# Patient Record
Sex: MALE | Race: WHITE | NOT HISPANIC OR LATINO | Employment: FULL TIME | URBAN - METROPOLITAN AREA
[De-identification: names, ages, dates, MRNs, and addresses within clinical notes are randomized per-mention and may not be internally consistent; named-entity substitution may affect disease eponyms.]

---

## 2017-03-15 NOTE — PROGRESS NOTES
"SUBJECTIVE:   Salvatore Kim is a 46 year old male seen here today for his right Shoulder   What happened: Lifting a bag out of his work truck.       Onset: 3 weeks    Description:   Character: Sharp    Intensity: moderate    Progression of Symptoms: worse    Accompanying Signs & Symptoms:  Other symptoms: numbness and tingling   History:   Previous similar pain: no       Precipitating factors:   Trauma or overuse: YES    Alleviating factors:  Improved by: nothing       Therapies Tried and outcome: None  States that he works as a  and left shoulder bothers him with over head activities.   No numbness/tingling     PFSH:  Past Medical, social, family histories, medications, and allergies were reviewed and updated.     OBJECTIVE:  /73  Pulse 72  Temp 97.4  F (36.3  C) (Oral)  Ht 5' 8\" (1.727 m)  Wt 285 lb 6.4 oz (129.5 kg)  SpO2 96%  BMI 43.39 kg/m2  General:  Salvatore is awake, alert, and cooperative.  NAD.  ailyn Shoulder Exam: Inspection: no swelling, bruising, discoloration, or obvious deformity or asymmetry  Tender: greater tuberosity  Range of Motion  Active:all normal  Passive: all normal  Strength: abduction  4/5, internal rotation  5/5 and external rotation  4/5  Special tests:  Positive: Neers and Ortiz    ASSESSMENT:     ICD-10-CM    1. Bilateral shoulder pain, unspecified chronicity M25.511 oxyCODONE-acetaminophen (PERCOCET) 5-325 MG per tablet    M25.512        PLAN:   ice or cold packs 20 minutes every 2-3 hrs as needed to relieve pain and swelling, for the first 2 days. Then can apply heat 20 minutes every 2-3 hrs (avoid sleeping on heating pad) there after as needed.   If able to tolerate non-steroidal anti-inflammatory medication like: Ibuprofen 600-800 mg three times daily or Aleve 200-400 mg twice daily.   Active range of motion exercises encouraged  Activity modification trying to avoid activities that cause you pain.   Follow up  Occupational Health Dr. Yen: the [facility] " Occupational Medicine team in [place] and can be reached at   [phone].     Nghia Momin PA-C

## 2017-03-16 ENCOUNTER — OFFICE VISIT (OUTPATIENT)
Dept: FAMILY MEDICINE | Facility: CLINIC | Age: 47
End: 2017-03-16
Payer: OTHER MISCELLANEOUS

## 2017-03-16 VITALS
TEMPERATURE: 97.4 F | DIASTOLIC BLOOD PRESSURE: 73 MMHG | HEART RATE: 72 BPM | SYSTOLIC BLOOD PRESSURE: 114 MMHG | HEIGHT: 68 IN | WEIGHT: 285.4 LBS | BODY MASS INDEX: 43.26 KG/M2 | OXYGEN SATURATION: 96 %

## 2017-03-16 DIAGNOSIS — M25.511 BILATERAL SHOULDER PAIN, UNSPECIFIED CHRONICITY: Primary | ICD-10-CM

## 2017-03-16 DIAGNOSIS — M25.512 BILATERAL SHOULDER PAIN, UNSPECIFIED CHRONICITY: Primary | ICD-10-CM

## 2017-03-16 PROCEDURE — 99213 OFFICE O/P EST LOW 20 MIN: CPT | Performed by: PHYSICIAN ASSISTANT

## 2017-03-16 RX ORDER — OXYCODONE AND ACETAMINOPHEN 5; 325 MG/1; MG/1
1 TABLET ORAL EVERY 8 HOURS PRN
Qty: 10 TABLET | Refills: 0 | Status: SHIPPED | OUTPATIENT
Start: 2017-03-16 | End: 2017-06-08

## 2017-03-16 NOTE — PATIENT INSTRUCTIONS
ice or cold packs 20 minutes every 2-3 hrs as needed to relieve pain and swelling, for the first 2 days. Then can apply heat 20 minutes every 2-3 hrs (avoid sleeping on heating pad) there after as needed.   If able to tolerate non-steroidal anti-inflammatory medication like: Ibuprofen 600-800 mg three times daily or Aleve 200-400 mg twice daily.   Active range of motion exercises encouraged  Activity modification trying to avoid activities that cause you pain.   Follow up : Occupational Health Dr. Yen: the [facility] Occupational Medicine team in [place] and can be reached at   [phone].

## 2017-03-16 NOTE — NURSING NOTE
"Chief Complaint   Patient presents with     Shoulder Pain       Initial /73  Pulse 72  Temp 97.4  F (36.3  C) (Oral)  Ht 5' 8\" (1.727 m)  Wt 285 lb 6.4 oz (129.5 kg)  SpO2 96%  BMI 43.39 kg/m2 Estimated body mass index is 43.39 kg/(m^2) as calculated from the following:    Height as of this encounter: 5' 8\" (1.727 m).    Weight as of this encounter: 285 lb 6.4 oz (129.5 kg).  BP completed using cuff size: anthony REY CMA (AMAA)  3:24 PM 3/16/2017    "

## 2017-03-16 NOTE — MR AVS SNAPSHOT
After Visit Summary   3/16/2017    Salvatore Kim    MRN: [id number]           Patient Information     Date Of Birth          1970        Visit Information        Provider Department      3/16/2017 3:30 PM Nghia Momin PA-C [facility]        Today's Diagnoses     Bilateral shoulder pain, unspecified chronicity    -  1      Care Instructions    ice or cold packs 20 minutes every 2-3 hrs as needed to relieve pain and swelling, for the first 2 days. Then can apply heat 20 minutes every 2-3 hrs (avoid sleeping on heating pad) there after as needed.   If able to tolerate non-steroidal anti-inflammatory medication like: Ibuprofen 600-800 mg three times daily or Aleve 200-400 mg twice daily.   Active range of motion exercises encouraged  Activity modification trying to avoid activities that cause you pain.   Follow up : Occupational Health Dr. Yen: the [facility] Occupational Medicine team in [place] and can be reached at   [phone].          Follow-ups after your visit        Who to contact     If you have questions or need follow up information about today's clinic visit or your schedule please contact [facility] directly at [phone].  Normal or non-critical lab and imaging results will be communicated to you by MyChart, letter or phone within 4 business days after the clinic has received the results. If you do not hear from us within 7 days, please contact the clinic through [employer] or phone. If you have a critical or abnormal lab result, we will notify you by phone as soon as possible.  Submit refill requests through [employer] or call your pharmacy and they will forward the refill request to us. Please allow 3 business days for your refill to be completed.          Additional Information About Your Visit        MyChart Information     [employer] gives you secure access to your electronic health record. If you see a primary care provider, you can also  "send messages to your care team and make appointments. If you have questions, please call your primary care clinic.  If you do not have a primary care provider, please call [phone] and they will assist you.        Care EveryWhere ID     This is your Care EveryWhere ID. This could be used by other organizations to access your [place] medical records  [id number]        Your Vitals Were     Pulse Temperature Height Pulse Oximetry BMI (Body Mass Index)       72 97.4  F (36.3  C) (Oral) 5' 8\" (1.727 m) 96% 43.39 kg/m2        Blood Pressure from Last 3 Encounters:   03/16/17 114/73   12/15/16 110/64   09/28/16 130/82    Weight from Last 3 Encounters:   03/16/17 285 lb 6.4 oz (129.5 kg)   12/15/16 294 lb (133.4 kg)   09/28/16 286 lb (129.7 kg)              Today, you had the following     No orders found for display         Today's Medication Changes          These changes are accurate as of: 3/16/17  4:00 PM.  If you have any questions, ask your nurse or doctor.               Start taking these medicines.        Dose/Directions    oxyCODONE-acetaminophen 5-325 MG per tablet   Commonly known as:  PERCOCET   Used for:  Bilateral shoulder pain, unspecified chronicity   Started by:  Nghia Momin PA-C        Dose:  1 tablet   Take 1 tablet by mouth every 8 hours as needed for moderate to severe pain   Quantity:  10 tablet   Refills:  0            Where to get your medicines      Some of these will need a paper prescription and others can be bought over the counter.  Ask your nurse if you have questions.     Bring a paper prescription for each of these medications     oxyCODONE-acetaminophen 5-325 MG per tablet                Primary Care Provider Office Phone # Fax #    Nghia Momin PA-C [phone]       [facility] [address] [facility] [address]        Thank you!     Thank you for choosing [facility]  for your care. Our goal is always to provide you with " excellent care. Hearing back from our patients is one way we can continue to improve our services. Please take a few minutes to complete the written survey that you may receive in the mail after your visit with us. Thank you!             Your Updated Medication List - Protect others around you: Learn how to safely use, store and throw away your medicines at www.disposemymeds.org.          This list is accurate as of: 3/16/17  4:00 PM.  Always use your most recent med list.                   Brand Name Dispense Instructions for use    EPINEPHrine 0.3 MG/0.3ML injection     2 each    Inject 0.3 mLs (0.3 mg) into the muscle once as needed for anaphylaxis       ketorolac 10 MG tablet    TORADOL    20 tablet    Take 1 tablet (10 mg) by mouth every 6 hours as needed       methocarbamol 500 MG tablet    ROBAXIN    30 tablet    Take 2 tablets (1,000 mg) by mouth 3 times daily as needed for muscle spasms       oxyCODONE-acetaminophen 5-325 MG per tablet    PERCOCET    10 tablet    Take 1 tablet by mouth every 8 hours as needed for moderate to severe pain       sildenafil 100 MG cap/tab    REVATIO/VIAGRA    10 tablet    Take 1 tablet (100 mg) by mouth daily as needed for erectile dysfunction

## 2017-06-08 ENCOUNTER — OFFICE VISIT (OUTPATIENT)
Dept: FAMILY MEDICINE | Facility: CLINIC | Age: 47
End: 2017-06-08
Payer: OTHER MISCELLANEOUS

## 2017-06-08 VITALS
SYSTOLIC BLOOD PRESSURE: 112 MMHG | BODY MASS INDEX: 37.34 KG/M2 | TEMPERATURE: 97.2 F | WEIGHT: 245.6 LBS | HEART RATE: 51 BPM | DIASTOLIC BLOOD PRESSURE: 79 MMHG

## 2017-06-08 DIAGNOSIS — Z01.818 PREOP GENERAL PHYSICAL EXAM: Primary | ICD-10-CM

## 2017-06-08 DIAGNOSIS — M25.511 RIGHT SHOULDER PAIN, UNSPECIFIED CHRONICITY: ICD-10-CM

## 2017-06-08 LAB — HGB BLD-MCNC: 14.8 G/DL (ref 13.3–17.7)

## 2017-06-08 PROCEDURE — 99214 OFFICE O/P EST MOD 30 MIN: CPT | Performed by: PHYSICIAN ASSISTANT

## 2017-06-08 PROCEDURE — 36415 COLL VENOUS BLD VENIPUNCTURE: CPT | Performed by: PHYSICIAN ASSISTANT

## 2017-06-08 PROCEDURE — 85018 HEMOGLOBIN: CPT | Performed by: PHYSICIAN ASSISTANT

## 2017-06-08 ASSESSMENT — ANXIETY QUESTIONNAIRES

## 2017-06-08 ASSESSMENT — PATIENT HEALTH QUESTIONNAIRE - PHQ9: 5. POOR APPETITE OR OVEREATING: NOT AT ALL

## 2017-06-08 NOTE — PROGRESS NOTES
[facility]  [address] [address] [phone]  Dept: [phone]    PRE-OP EVALUATION:  Today's date: 2017    Salvatore Kim (: 1970) presents for pre-operative evaluation assessment as requested by Dr. Frey.  He requires evaluation and anesthesia risk assessment prior to undergoing surgery/procedure for treatment of shoulder pain .  Proposed procedure: Right shoulder rotator cuff repair     Date of Surgery/ Procedure: 17  Time of Surgery/ Procedure: 9:30  Hospital/Surgical Facility: [facility]   Fax number for surgical facility: [phone]  Primary Physician: Nghia Momin  Type of Anesthesia Anticipated: to be determined    Patient has a Health Care Directive or Living Will:  NO    1. NO - Do you have a history of heart attack, stroke, stent, bypass or surgery on an artery in the head, neck, heart or legs?  2. NO - Do you ever have any pain or discomfort in your chest?  3. NO - Do you have a history of  Heart Failure?  4. NO - Are you troubled by shortness of breath when: walking on the level, up a slight hill or at night?  5. NO - DO YOU CURRENTLY HAVE A COLD, BRONCHITIS OR OTHER RESPIRATORY INFECTION?   6. NO - Do you have a cough, shortness of breath or wheezing?  7. NO - Do you sometimes get pains in the calves of your legs when you walk?  8. NO - Do you or anyone in your family have previous history of blood clots?  9. NO - Do you or does anyone in your family have a serious bleeding problem such as prolonged bleeding following surgeries or cuts?  10. NO - Have you ever had problems with anemia or been told to take iron pills?  11. NO - Have you had any abnormal blood loss such as black, tarry or bloody stools, or abnormal vaginal bleeding?  12. NO - Have you ever had a blood transfusion?  13. NO - Have you or any of your relatives ever had problems with anesthesia?  14. YES - DO YOU HAVE SLEEP APNEA, EXCESSIVE SNORING  OR DAYTIME DROWSINESS? snores  15. NO - Do you have any prosthetic heart valves?  16. NO - Do you have prosthetic joints?  17. NO - Is there any chance that you may be pregnant?      HPI:                                                      Brief HPI related to upcoming procedure: surgery/procedure for treatment of shoulder pain .  Proposed procedure: Right shoulder rotator cuff repair         See problem list for active medical problems.  Problems all longstanding and stable, except as noted/documented.  See ROS for pertinent symptoms related to these conditions.                                                                                                  .    MEDICAL HISTORY:                                                      Patient Active Problem List    Diagnosis Date Noted     Right shoulder pain, unspecified chronicity 06/08/2017     Priority: Medium     BMI 40.0-44.9, adult (H) 01/07/2016     Priority: Medium     Esophageal reflux (GERD) 05/15/2014     Priority: Medium     Vitamin deficiency 12/20/2013     Priority: Medium     Problem list name updated by automated process. Provider to review       CARDIOVASCULAR SCREENING; LDL GOAL LESS THAN 130 12/18/2013     Priority: Medium     HNP (herniated nucleus pulposus), lumbar 06/05/2013     Priority: Medium     Anal fissure 06/05/2013     Priority: Medium     Allergic reaction 07/06/2012     Priority: Medium     Impotence 06/06/2012     Priority: Medium     Metabolic syndrome      Priority: Medium     Tobacco abuse 05/29/2012     Priority: Medium     Presbyopia OU 05/29/2012     Priority: Medium     Hypertriglyceridemia 05/29/2012     Priority: Medium     Mild major depression (H) 05/18/2011     Priority: Medium      Past Medical History:   Diagnosis Date     Allergies      Arthritis      Depression, anxiety      ED (erectile dysfunction)      Hyperlipidemia      Metabolic syndrome      Past Surgical History:   Procedure Laterality Date     COLONOSCOPY   6/7/2013    Procedure: COLONOSCOPY;  COLONOSCOPY, EXAM UNDER ANESTHESIA,  FISTULOTOMY;  Surgeon: Maxwell Israel MD;  Location: [facility]     EXAM UNDER ANESTHESIA ANUS  6/7/2013    Procedure: EXAM UNDER ANESTHESIA ANUS;;  Surgeon: Maxwell Israel MD;  Location: [facility]     FISTULOTOMY RECTUM  6/7/2013    Procedure: FISTULOTOMY RECTUM;;  Surgeon: Maxwell Israel MD;  Location: [facility]     HEMORRHOID SURGERY       RELEASE CARPAL TUNNEL  2001    Rt     Current Outpatient Prescriptions   Medication Sig Dispense Refill     sildenafil (VIAGRA) 100 MG tablet Take 1 tablet (100 mg) by mouth daily as needed for erectile dysfunction 10 tablet 5     EPINEPHrine (EPIPEN) 0.3 MG/0.3ML injection Inject 0.3 mLs (0.3 mg) into the muscle once as needed for anaphylaxis (Patient not taking: Reported on 3/16/2017) 2 each 6     OTC products: None, except as noted above    Allergies   Allergen Reactions     Bees Swelling      Latex Allergy: NO    Social History   Substance Use Topics     Smoking status: Current Every Day Smoker     Packs/day: 1.50     Years: 25.00     Types: Cigarettes     Smokeless tobacco: Never Used      Comment: quit 3 weeks ago.     Alcohol use 2.0 oz/week     4 Standard drinks or equivalent per week      Comment: RARE, socially      History   Drug Use No       REVIEW OF SYSTEMS:                                                    C: NEGATIVE for fever, chills, change in weight  I: NEGATIVE for worrisome rashes, moles or lesions  E: NEGATIVE for vision changes or irritation  E/M: NEGATIVE for ear, mouth and throat problems  R: NEGATIVE for significant cough or SOB  B: NEGATIVE for masses, tenderness or discharge  CV: NEGATIVE for chest pain, palpitations or peripheral edema  GI: NEGATIVE for nausea, abdominal pain, heartburn, or change in bowel habits  : NEGATIVE for frequency, dysuria, or hematuria  M: NEGATIVE for significant arthralgias or myalgia  N: NEGATIVE for weakness, dizziness or paresthesias  E: NEGATIVE for  temperature intolerance, skin/hair changes  H: NEGATIVE for bleeding problems  P: NEGATIVE for changes in mood or affect    EXAM:                                                    /79  Pulse 51  Temp 97.2  F (36.2  C) (Oral)  Wt 245 lb 9.6 oz (111.4 kg)  BMI 37.34 kg/m2    GENERAL APPEARANCE: healthy, alert and no distress     EYES: EOMI,  PERRL     HENT: ear canals and TM's normal and nose and mouth without ulcers or lesions     NECK: no adenopathy, no asymmetry, masses, or scars and thyroid normal to palpation     RESP: lungs clear to auscultation - no rales, rhonchi or wheezes     CV: regular rates and rhythm, normal S1 S2, no S3 or S4 and no murmur, click or rub     ABDOMEN:  soft, nontender, no HSM or masses and bowel sounds normal     MS: extremities normal- no gross deformities noted, no evidence of inflammation in joints, FROM in all extremities.     SKIN: no suspicious lesions or rashes     NEURO: Normal strength and tone, sensory exam grossly normal, mentation intact and speech normal     PSYCH: mentation appears normal. and affect normal/bright     LYMPHATICS: No axillary, cervical, or supraclavicular nodes    DIAGNOSTICS:                                                      Labs Resulted Today:   Results for orders placed or performed in visit on 06/08/17   Hemoglobin   Result Value Ref Range    Hemoglobin 14.8 13.3 - 17.7 g/dL       Recent Labs   Lab Test  01/09/16   1040  05/07/14   0752  12/18/13   1539  06/05/13   1530   05/29/12   0810   HGB   --    --   15.0  14.6   < >   --    PLT   --    --   220  225   < >   --    NA  141   --   140  141   < >   --    POTASSIUM  4.1   --   3.9  4.3   < >   --    CR  0.84   --   0.77  0.86   < >   --    A1C   --   5.8   --    --    --   5.6    < > = values in this interval not displayed.        IMPRESSION:                                                    Reason for surgery/procedure: surgery/procedure for treatment of shoulder pain .  Proposed  procedure: Right shoulder rotator cuff repair       The proposed surgical procedure is considered LOW risk.    REVISED CARDIAC RISK INDEX  The patient has the following serious cardiovascular risks for perioperative complications such as (MI, PE, VFib and 3  AV Block):  No serious cardiac risks  INTERPRETATION: 0 risks: Class I (very low risk - 0.4% complication rate)    The patient has the following additional risks for perioperative complications:  No identified additional risks      ICD-10-CM    1. Preop general physical exam Z01.818 Hemoglobin   2. Right shoulder pain, unspecified chronicity M25.511    3. BMI 40.0-44.9, adult (H) Z68.41 SLEEP EVALUATION & MANAGEMENT REFERRAL - ADULT       RECOMMENDATIONS:                                                          APPROVAL GIVEN to proceed with proposed procedure, without further diagnostic evaluation       Signed Electronically by: Nghia Momin PA-C  Discussed this patient with  Nghia Momin and agree with above assessment and plan. The patient is an acceptable candidate for the proposed anasthesia.  Cameron Comer MD      Copy of this evaluation report is provided to requesting physician.    [facility] Preop Guidelines

## 2017-06-08 NOTE — MR AVS SNAPSHOT
After Visit Summary   6/8/2017    Salvatore Kim    MRN: [id number]           Patient Information     Date Of Birth          1970        Visit Information        Provider Department      6/8/2017 3:30 PM Nghia Momin PA-C [facility]        Today's Diagnoses     Preop general physical exam    -  1    Right shoulder pain, unspecified chronicity        BMI 40.0-44.9, adult (H)          Care Instructions      Before Your Surgery      Call your surgeon if there is any change in your health. This includes signs of a cold or flu (such as a sore throat, runny nose, cough, rash or fever).    Do not smoke, drink alcohol or take over the counter medicine (unless your surgeon or primary care doctor tells you to) for the 24 hours before and after surgery.    If you take prescribed drugs: Follow your doctor s orders about which medicines to take and which to stop until after surgery.    Eating and drinking prior to surgery: follow the instructions from your surgeon    Take a shower or bath the night before surgery. Use the soap your surgeon gave you to gently clean your skin. If you do not have soap from your surgeon, use your regular soap. Do not shave or scrub the surgery site.  Wear clean pajamas and have clean sheets on your bed.           Follow-ups after your visit        Additional Services     SLEEP EVALUATION & MANAGEMENT REFERRAL - ADULT       Please be aware that coverage of these services is subject to the terms and limitations of your health insurance plan.  Call member services at your health plan with any benefit or coverage questions.      Please bring the following to your appointment:    >>   List of current medications   >>   This referral request   >>   Any documents/labs given to you for this referral    [facility] [place] Ph [phone] (Age 15 and up)                  Future tests that were ordered for you today     Open Future Orders         Priority Expected Expires Ordered    SLEEP EVALUATION & MANAGEMENT REFERRAL - ADULT Routine  6/8/2018 6/8/2017            Who to contact     If you have questions or need follow up information about today's clinic visit or your schedule please contact [facility] directly at [phone].  Normal or non-critical lab and imaging results will be communicated to you by MyChart, letter or phone within 4 business days after the clinic has received the results. If you do not hear from us within 7 days, please contact the clinic through [employer] or phone. If you have a critical or abnormal lab result, we will notify you by phone as soon as possible.  Submit refill requests through [employer] or call your pharmacy and they will forward the refill request to us. Please allow 3 business days for your refill to be completed.          Additional Information About Your Visit        [employer] Information     [employer] gives you secure access to your electronic health record. If you see a primary care provider, you can also send messages to your care team and make appointments. If you have questions, please call your primary care clinic.  If you do not have a primary care provider, please call [phone] and they will assist you.        Care EveryWhere ID     This is your Care EveryWhere ID. This could be used by other organizations to access your [place] medical records  [id number]        Your Vitals Were     Pulse Temperature BMI (Body Mass Index)             51 97.2  F (36.2  C) (Oral) 37.34 kg/m2          Blood Pressure from Last 3 Encounters:   06/08/17 112/79   03/16/17 114/73   12/15/16 110/64    Weight from Last 3 Encounters:   06/08/17 245 lb 9.6 oz (111.4 kg)   03/16/17 285 lb 6.4 oz (129.5 kg)   12/15/16 294 lb (133.4 kg)              We Performed the Following     Hemoglobin        Primary Care Provider Office Phone # Fax #    Nghia Momin PA-C [phone]       [facility]  [address] [address] [address]        Thank you!     Thank you for choosing [facility]  for your care. Our goal is always to provide you with excellent care. Hearing back from our patients is one way we can continue to improve our services. Please take a few minutes to complete the written survey that you may receive in the mail after your visit with us. Thank you!             Your Updated Medication List - Protect others around you: Learn how to safely use, store and throw away your medicines at www.disposemymeds.org.          This list is accurate as of: 6/8/17  3:35 PM.  Always use your most recent med list.                   Brand Name Dispense Instructions for use    EPINEPHrine 0.3 MG/0.3ML injection     2 each    Inject 0.3 mLs (0.3 mg) into the muscle once as needed for anaphylaxis       sildenafil 100 MG cap/tab    REVATIO/VIAGRA    10 tablet    Take 1 tablet (100 mg) by mouth daily as needed for erectile dysfunction

## 2017-06-08 NOTE — NURSING NOTE
"Chief Complaint   Patient presents with     Pre-Op Exam       Initial /79  Pulse 51  Temp 97.2  F (36.2  C) (Oral)  Wt 245 lb 9.6 oz (111.4 kg)  BMI 37.34 kg/m2 Estimated body mass index is 37.34 kg/(m^2) as calculated from the following:    Height as of 3/16/17: 5' 8\" (1.727 m).    Weight as of this encounter: 245 lb 9.6 oz (111.4 kg).  Medication Reconciliation: complete  Anshu Oleary CMA    "

## 2017-06-09 ASSESSMENT — ANXIETY QUESTIONNAIRES: GAD7 TOTAL SCORE: 0

## 2017-06-09 ASSESSMENT — PATIENT HEALTH QUESTIONNAIRE - PHQ9: SUM OF ALL RESPONSES TO PHQ QUESTIONS 1-9: 2

## 2017-08-02 ENCOUNTER — THERAPY VISIT (OUTPATIENT)
Dept: PHYSICAL THERAPY | Facility: CLINIC | Age: 47
End: 2017-08-02
Payer: OTHER MISCELLANEOUS

## 2017-08-02 DIAGNOSIS — M25.511 RIGHT SHOULDER PAIN, UNSPECIFIED CHRONICITY: Primary | ICD-10-CM

## 2017-08-02 DIAGNOSIS — Z98.890 S/P SHOULDER SURGERY: ICD-10-CM

## 2017-08-02 PROCEDURE — 97161 PT EVAL LOW COMPLEX 20 MIN: CPT | Mod: GP | Performed by: PHYSICAL THERAPIST

## 2017-08-02 PROCEDURE — 97110 THERAPEUTIC EXERCISES: CPT | Mod: GP | Performed by: PHYSICAL THERAPIST

## 2017-08-02 NOTE — LETTER
[facility]  [address]  [place] [address]  [phone]    2017    Re: Salvatore Kim   :   1970  MRN:  [id number]   REFERRING PHYSICIAN:   [facility]    Date of Initial Evaluation:  17  Visits:  Rxs Used: 1  Reason for Referral:     Right shoulder pain, unspecified chronicity  S/P shoulder surgery    [facility] Initial Evaluation    Subjective:    HPI Comments: SPADI 89/100   Patient is a 47 year old male presenting with rehab right shoulder hpi.   Salvatore Kim is a 47 year old male with a right shoulder condition.  Condition occurred with:  Repetition/overuse and lifting.  Condition occurred: at work.  This is a new condition  2017, post op RCR (Supraspinatus, Subscap), extensive debridement and bicep tenodesis by Dr. Frey.    Patient reports pain:  Anterior, lateral, upper arm and in the joint.  Radiates to:  Shoulder and upper arm.  Pain is described as sharp and aching and is constant and reported as 5/10 (occasionally spikes to 9-10/10 ).  Associated symptoms:  Loss of motion/stiffness, painful arc and loss of strength. Pain is the same all the time.  Symptoms are exacerbated by using arm overhead, using arm at shoulder level, using arm behind back, lifting, carrying and lying on extremity (folloing post op use of sling and minimal use following surgery ) and relieved by ice and bracing/immobilizing.  Since onset symptoms are gradually improving.  Special tests:  MRI and x-ray.      General health as reported by patient is good.  Pertinent medical history includes:  Smoking.  Medical allergies: no.  Other surgeries include:  None reported.  Current medications:  None as reported by the patient.  Current occupation is [employer] .  Patient is currently not working due to present treatment problem.  Primary job tasks include:  Prolonged sitting, prolonged standing, lifting, repetitive tasks,  operating a machine and driving.  Barriers include:  None as reported by the patient.  Red flags:  None as reported by the patient.    Objective:    Standing Alignment:    Cervical/Thoracic:  Forward head  Shoulder/UE:  Rounded shoulders    Gait:    Gait Type:  Normal     Flexibility/Screens:   Negative screens: Cervical   Neurological: He is alert. He has normal strength and normal reflexes. No cranial nerve deficit or sensory deficit.     Shoulder Evaluation:  ROM:    PROM:    Flexion:  Right: 60    Internal Rotation:  Right:  40  External Rotation:  Right:  0-10    Pain: patient apprehensive to movement and pain.     Strength:  not assessed    Palpation:    Right shoulder tenderness present at: Acrimioclavicular; Upper Trap and Incisional    Review of Systems   Musculoskeletal: Negative for neck pain.     Assessment/Plan:    Patient is a 47 year old male with right side shoulder complaints.    Patient has the following significant findings with corresponding treatment plan.                Diagnosis 1:  R shoulder arthroscopic RCR , caution with subscap repair   Pain -  hot/cold therapy, self management, education, directional preference exercise, home program and post operative MD protocol   Decreased ROM/flexibility -   Decreased strength -   Decreased function -     Therapy Evaluation Codes:   1) History comprised of:   Personal factors that impact the plan of care:      Past/current experiences and post operative protocol precautions .    Comorbidity factors that impact the plan of care are:      Smoking.     Medications impacting care: None.  2) Examination of Body Systems comprised of:   Body structures and functions that impact the plan of care:      Shoulder.   Activity limitations that impact the plan of care are:      Cooking, Dressing, Lifting, Working, Sleeping and Laying down.  3) Clinical presentation characteristics are:   Stable/Uncomplicated.  4) Decision-Making    Low complexity using  standardized patient assessment instrument and/or measureable assessment of functional outcome.  Cumulative Therapy Evaluation is: Low complexity.  Previous and current functional limitations:  (See Goal Flow Sheet for this information)    Short term and Long term goals: (See Goal Flow Sheet for this information)   Communication ability:  Patient appears to be able to clearly communicate and understand verbal and written communication and follow directions correctly.  Treatment Explanation - The following has been discussed with the patient:   RX ordered/plan of care  Anticipated outcomes  Possible risks and side effects  This patient would benefit from PT intervention to resume normal activities.   Rehab potential is good.    Frequency:  1 X week, once daily  Duration:  for 6 weeks tapering to 1 X a week every other week  over 12 weeks  Discharge Plan:  Achieve all LTG.  Independent in home treatment program.  Reach maximal therapeutic benefit.    Rehab Plans: SAOS post op RCR Protocol     Thank you for your referral.    INQUIRIES  Therapist: [facility]  [address] [place] [address]  Phone: [phone]  Fax: [phone]

## 2017-08-08 ENCOUNTER — THERAPY VISIT (OUTPATIENT)
Dept: PHYSICAL THERAPY | Facility: CLINIC | Age: 47
End: 2017-08-08
Payer: OTHER MISCELLANEOUS

## 2017-08-08 DIAGNOSIS — M25.511 RIGHT SHOULDER PAIN, UNSPECIFIED CHRONICITY: ICD-10-CM

## 2017-08-08 DIAGNOSIS — Z98.890 S/P SHOULDER SURGERY: ICD-10-CM

## 2017-08-08 PROCEDURE — 97110 THERAPEUTIC EXERCISES: CPT | Mod: GP | Performed by: PHYSICAL THERAPIST

## 2017-08-13 ASSESSMENT — ENCOUNTER SYMPTOMS: NECK PAIN: 0

## 2017-08-14 NOTE — PROGRESS NOTES
[facility] Initial Evaluation      Subjective:    HPI Comments: SPADI 89/100     Patient is a 47 year old male presenting with rehab right shoulder hpi.   Salvatore Kim is a 47 year old male with a right shoulder condition.  Condition occurred with:  Repetition/overuse and lifting.  Condition occurred: at work.  This is a new condition  6/19/2017, post op RCR (Supraspinatus, Subscap), extensive debridement and bicep tenodesis by Dr. Frey.    Patient reports pain:  Anterior, lateral, upper arm and in the joint.  Radiates to:  Shoulder and upper arm.  Pain is described as sharp and aching and is constant and reported as 5/10 (occasionally spikes to 9-10/10 ).  Associated symptoms:  Loss of motion/stiffness, painful arc and loss of strength. Pain is the same all the time.  Symptoms are exacerbated by using arm overhead, using arm at shoulder level, using arm behind back, lifting, carrying and lying on extremity (folloing post op use of sling and minimal use following surgery ) and relieved by ice and bracing/immobilizing.  Since onset symptoms are gradually improving.  Special tests:  MRI and x-ray.      General health as reported by patient is good.  Pertinent medical history includes:  Smoking.  Medical allergies: no.  Other surgeries include:  None reported.  Current medications:  None as reported by the patient.  Current occupation is [employer] .  Patient is currently not working due to present treatment problem.  Primary job tasks include:  Prolonged sitting, prolonged standing, lifting, repetitive tasks, operating a machine and driving.    Barriers include:  None as reported by the patient.    Red flags:  None as reported by the patient.                        Objective:    Standing Alignment:    Cervical/Thoracic:  Forward head  Shoulder/UE:  Rounded shoulders              Gait:    Gait Type:  Normal         Flexibility/Screens:   Negative screens: Cervical           Neurological: He is  alert. He has normal strength and normal reflexes. No cranial nerve deficit or sensory deficit.                      Shoulder Evaluation:  ROM:    PROM:    Flexion:  Right: 60          Internal Rotation:  Right:  40  External Rotation:  Right:  0-10                Pain: patient apprehensive to movement and pain.     Strength:  not assessed                          Palpation:      Right shoulder tenderness present at: Acrimioclavicular; Upper Trap and Incisional                                     General     Review of Systems   Musculoskeletal: Negative for neck pain.       Assessment/Plan:      Patient is a 47 year old male with right side shoulder complaints.    Patient has the following significant findings with corresponding treatment plan.                Diagnosis 1:  R shoulder arthroscopic RCR , caution with subscap repair   Pain -  hot/cold therapy, self management, education, directional preference exercise, home program and post operative MD protocol   Decreased ROM/flexibility -   Decreased strength -   Decreased function -     Therapy Evaluation Codes:   1) History comprised of:   Personal factors that impact the plan of care:      Past/current experiences and post operative protocol precautions .    Comorbidity factors that impact the plan of care are:      Smoking.     Medications impacting care: None.  2) Examination of Body Systems comprised of:   Body structures and functions that impact the plan of care:      Shoulder.   Activity limitations that impact the plan of care are:      Cooking, Dressing, Lifting, Working, Sleeping and Laying down.  3) Clinical presentation characteristics are:   Stable/Uncomplicated.  4) Decision-Making    Low complexity using standardized patient assessment instrument and/or measureable assessment of functional outcome.  Cumulative Therapy Evaluation is: Low complexity.    Previous and current functional limitations:  (See Goal Flow Sheet for this information)    Short  term and Long term goals: (See Goal Flow Sheet for this information)     Communication ability:  Patient appears to be able to clearly communicate and understand verbal and written communication and follow directions correctly.  Treatment Explanation - The following has been discussed with the patient:   RX ordered/plan of care  Anticipated outcomes  Possible risks and side effects  This patient would benefit from PT intervention to resume normal activities.   Rehab potential is good.    Frequency:  1 X week, once daily  Duration:  for 6 weeks tapering to 1 X a week every other week  over 12 weeks  Discharge Plan:  Achieve all LTG.  Independent in home treatment program.  Reach maximal therapeutic benefit.    Rehab Plans: SAOS post op RCR Protocol     Please refer to the daily flowsheet for treatment today, total treatment time and time spent performing 1:1 timed codes.

## 2017-08-17 ENCOUNTER — THERAPY VISIT (OUTPATIENT)
Dept: PHYSICAL THERAPY | Facility: CLINIC | Age: 47
End: 2017-08-17
Payer: OTHER MISCELLANEOUS

## 2017-08-17 DIAGNOSIS — Z98.890 S/P SHOULDER SURGERY: Primary | ICD-10-CM

## 2017-08-17 DIAGNOSIS — M25.511 RIGHT SHOULDER PAIN, UNSPECIFIED CHRONICITY: ICD-10-CM

## 2017-08-17 PROCEDURE — 97110 THERAPEUTIC EXERCISES: CPT | Mod: GP | Performed by: PHYSICAL THERAPIST

## 2017-08-22 ENCOUNTER — THERAPY VISIT (OUTPATIENT)
Dept: PHYSICAL THERAPY | Facility: CLINIC | Age: 47
End: 2017-08-22
Payer: OTHER MISCELLANEOUS

## 2017-08-22 DIAGNOSIS — Z98.890 S/P SHOULDER SURGERY: ICD-10-CM

## 2017-08-22 DIAGNOSIS — M25.511 RIGHT SHOULDER PAIN, UNSPECIFIED CHRONICITY: ICD-10-CM

## 2017-08-22 PROCEDURE — 97110 THERAPEUTIC EXERCISES: CPT | Mod: GP | Performed by: PHYSICAL THERAPIST

## 2017-08-29 ENCOUNTER — THERAPY VISIT (OUTPATIENT)
Dept: PHYSICAL THERAPY | Facility: CLINIC | Age: 47
End: 2017-08-29
Payer: OTHER MISCELLANEOUS

## 2017-08-29 DIAGNOSIS — Z98.890 S/P SHOULDER SURGERY: ICD-10-CM

## 2017-08-29 DIAGNOSIS — M25.511 RIGHT SHOULDER PAIN, UNSPECIFIED CHRONICITY: ICD-10-CM

## 2017-08-29 PROCEDURE — 97110 THERAPEUTIC EXERCISES: CPT | Mod: GP | Performed by: PHYSICAL THERAPIST

## 2017-09-05 ENCOUNTER — THERAPY VISIT (OUTPATIENT)
Dept: PHYSICAL THERAPY | Facility: CLINIC | Age: 47
End: 2017-09-05
Payer: OTHER MISCELLANEOUS

## 2017-09-05 DIAGNOSIS — M25.511 RIGHT SHOULDER PAIN, UNSPECIFIED CHRONICITY: ICD-10-CM

## 2017-09-05 DIAGNOSIS — Z98.890 S/P SHOULDER SURGERY: ICD-10-CM

## 2017-09-05 PROCEDURE — 97110 THERAPEUTIC EXERCISES: CPT | Mod: GP | Performed by: PHYSICAL THERAPIST

## 2017-09-05 NOTE — LETTER
[facility]  [address]  [place] [address]  [phone]    2017    Re: Salvatore Kim   :   1970  MRN:  [id number]   REFERRING PHYSICIAN:   [facility]    Date of Initial Evaluation:  17  Visits:  Rxs Used: 6  Reason for Referral:     S/P shoulder surgery  Right shoulder pain, unspecified chronicity    PROGRESS  REPORT  Progress reporting period is from 17 to 17.     SUBJECTIVE  Subjective: pt reports arm is feeling stiff in the morning but overall continued improvement.   Current Pain level: 0/10   Initial Pain level: 9/10   Changes in function: Yes, see goal flow sheet for change in function   Adverse reactions: None   The objective findings are from DOS 17.    OBJECTIVE  Objective: AAROM R shoulder flx:147degs abd:147degs ER:58degs       ASSESSMENT/PLAN  Updated problem list and treatment plan: Diagnosis 1:  S/p R RTCR, extensive debridement, subpec bicep tendon transplant  STG/LTGs have been met or progress has been made towards goals:  Yes, progressing towards active painfree reaching.  Assessment of Progress: The patient's condition is improving.  The patient's condition has potential to improve.  Self Management Plans:  Patient has been instructed in a home treatment program.  Patient is independent in a home treatment program.  I have re-evaluated this patient and find that the nature, scope, duration and intensity of the therapy is appropriate for the medical condition of the patient.  Salvatore continues to require the following intervention to meet STG and LTG's: PT  The patient is returning to your office for a recheck appointment.    Recommendations:  Salvatore continues to improve AAROM R shoulder with progressively decreased pain. He continues to reports morning stiffness with intermittent RUE tingling/numbness that overall has improved. Today he mentioned for the first time some mid clavicle irritation that I  suspect is related to pec portion of surgery. Currently we have used 6 of 6 PT visits, he would benefit from continued PT services to continue to progress AAROM to AROM and initiate and progress shoulder strengthening once you feel appropriate. Please provide patient with updated PT order. Thank you.    Thank you for your referral.    INQUIRIES  Therapist: Roldan Bejarano DPT [facility]  [address]  [place] [address]  Phone: [phone]  Fax: [phone]

## 2017-09-05 NOTE — PROGRESS NOTES
Subjective:    HPI                    Objective:    System    Physical Exam    General     ROS    Assessment/Plan:      PROGRESS  REPORT    Progress reporting period is from 8/8/17 to 9/5/17.     SUBJECTIVE  Subjective: pt reports arm is feeling stiff in the morning but overall continued improvement.   Current Pain level: 0/10   Initial Pain level: 9/10   Changes in function: Yes, see goal flow sheet for change in function   Adverse reactions: None;   ,     The objective findings are from DOS 9/5/17.    OBJECTIVE  Objective: AAROM R shoulder flx:147degs abd:147degs ER:58degs       ASSESSMENT/PLAN  Updated problem list and treatment plan: Diagnosis 1:  S/p R RTCR, extensive debridement, subpec bicep tendon transplant  STG/LTGs have been met or progress has been made towards goals:  Yes, progressing towards active painfree reaching.  Assessment of Progress: The patient's condition is improving.  The patient's condition has potential to improve.  Self Management Plans:  Patient has been instructed in a home treatment program.  Patient is independent in a home treatment program.  I have re-evaluated this patient and find that the nature, scope, duration and intensity of the therapy is appropriate for the medical condition of the patient.  Salvatore continues to require the following intervention to meet STG and LTG's: PT  The patient is returning to your office for a recheck appointment.    Recommendations:  Salvatore continues to improve AAROM R shoulder with progressively decreased pain. He continues to reports morning stiffness with intermittent RUE tingling/numbness that overall has improved. Today he mentioned for the first time some mid clavicle irritation that I suspect is related to pec portion of surgery. Currently we have used 6 of 6 PT visits, he would benefit from continued PT services to continue to progress AAROM to AROM and initiate and progress shoulder strengthening once you feel appropriate. Please provide  patient with updated PT order. Thank you.    Please refer to the daily flowsheet for treatment today, total treatment time and time spent performing 1:1 timed codes.

## 2017-09-05 NOTE — MR AVS SNAPSHOT
After Visit Summary   9/5/2017    Salvatore Kim    MRN: [id number]           Patient Information     Date Of Birth          1970        Visit Information        Provider Department      9/5/2017 9:00 AM Roldan Bejarano PT [facility]        Today's Diagnoses     S/P shoulder surgery        Right shoulder pain, unspecified chronicity           Follow-ups after your visit        Your next 10 appointments already scheduled     Sep 14, 2017 10:40 AM CDT   [facility] with Beba Costa OD   [facility]    [address] [facility] [address]   [phone]            Sep 14, 2017  1:30 PM CDT   [facility] ([facility] [place]    [address]  [place] [id number] [phone]            Sep 21, 2017  9:00 AM CDT   [facility] ([facility] [place])    [address]  [place] [id number] [phone]              Who to contact     If you have questions or need follow up information about today's clinic visit or your schedule please contact [facility] directly at [phone].  Normal or non-critical lab and imaging results will be communicated to you by [employer], letter or phone within 4 business days after the clinic has received the results. If you do not hear from us within 7 days, please contact the clinic through [employer] or phone. If you have a critical or abnormal lab result, we will notify you by phone as soon as possible.  Submit refill requests through [employer] or call your pharmacy and they will forward the refill request to us. Please allow 3 business days for your refill to be completed.          Additional Information About Your Visit        [employer] Information     [employer] gives you secure access to your electronic health record. If you see a primary care provider, you can also send messages to your  care team and make appointments. If you have questions, please call your primary care clinic.  If you do not have a primary care provider, please call [phone] and they will assist you.        Care EveryWhere ID     This is your Care EveryWhere ID. This could be used by other organizations to access your [facility] medical records  [id number]         Blood Pressure from Last 3 Encounters:   06/08/17 112/79   03/16/17 114/73   12/15/16 110/64    Weight from Last 3 Encounters:   06/08/17 111.4 kg (245 lb 9.6 oz)   03/16/17 129.5 kg (285 lb 6.4 oz)   12/15/16 133.4 kg (294 lb)              We Performed the Following     [facility] PROGRESS NOTES REPORT     THERAPEUTIC EXERCISES        Primary Care Provider Office Phone # Fax #    Nghia Momin PA-C [phone] [address] [facility] [address]        Equal Access to Services     DOMINIC CASTANEDA : Hadii aad ku hadasho Soomaali, waaxda luqadaha, qaybta kaalmada adeegyada, waxay idiin hayannetten raudel [place] . So [facility] [phone].    ATENCIÓN: Si dawsonla espscotty, tiene a lucas disposición servicios gratuitos de asistencia lingüística. Llame al [phone].    We comply with applicable federal civil rights laws and [place] laws. We do not discriminate on the basis of race, color, national origin, age, disability sex, sexual orientation or gender identity.            Thank you!     Thank you for choosing [facility]  for your care. Our goal is always to provide you with excellent care. Hearing back from our patients is one way we can continue to improve our services. Please take a few minutes to complete the written survey that you may receive in the mail after your visit with us. Thank you!             Your Updated Medication List - Protect others around you: Learn how to safely use, store and throw away your medicines at www.disposemymeds.org.          This list is accurate as of: 9/5/17  9:52 AM.  Always use your most recent  med list.                   Brand Name Dispense Instructions for use Diagnosis    EPINEPHrine 0.3 MG/0.3ML injection 2-pack    EPIPEN/ADRENACLICK/or ANY BX GENERIC EQUIV    2 each    Inject 0.3 mLs (0.3 mg) into the muscle once as needed for anaphylaxis    Hives, Allergic reaction, subsequent encounter       sildenafil 100 MG cap/tab    REVATIO/VIAGRA    10 tablet    Take 1 tablet (100 mg) by mouth daily as needed for erectile dysfunction    Impotence

## 2017-09-14 ENCOUNTER — THERAPY VISIT (OUTPATIENT)
Dept: PHYSICAL THERAPY | Facility: CLINIC | Age: 47
End: 2017-09-14
Payer: OTHER MISCELLANEOUS

## 2017-09-14 ENCOUNTER — OFFICE VISIT (OUTPATIENT)
Dept: OPTOMETRY | Facility: CLINIC | Age: 47
End: 2017-09-14
Payer: COMMERCIAL

## 2017-09-14 DIAGNOSIS — H52.203 HYPEROPIA WITH ASTIGMATISM AND PRESBYOPIA, BILATERAL: Primary | ICD-10-CM

## 2017-09-14 DIAGNOSIS — H52.4 HYPEROPIA WITH ASTIGMATISM AND PRESBYOPIA, BILATERAL: Primary | ICD-10-CM

## 2017-09-14 DIAGNOSIS — H52.03 HYPEROPIA WITH ASTIGMATISM AND PRESBYOPIA, BILATERAL: Primary | ICD-10-CM

## 2017-09-14 DIAGNOSIS — H04.123 DRY EYES, BILATERAL: ICD-10-CM

## 2017-09-14 DIAGNOSIS — M25.511 RIGHT SHOULDER PAIN, UNSPECIFIED CHRONICITY: ICD-10-CM

## 2017-09-14 DIAGNOSIS — Z98.890 S/P SHOULDER SURGERY: ICD-10-CM

## 2017-09-14 PROCEDURE — 92015 DETERMINE REFRACTIVE STATE: CPT | Performed by: OPTOMETRIST

## 2017-09-14 PROCEDURE — 97110 THERAPEUTIC EXERCISES: CPT | Mod: GP | Performed by: PHYSICAL THERAPIST

## 2017-09-14 PROCEDURE — 92014 COMPRE OPH EXAM EST PT 1/>: CPT | Performed by: OPTOMETRIST

## 2017-09-14 ASSESSMENT — EXTERNAL EXAM - LEFT EYE: OS_EXAM: NORMAL

## 2017-09-14 ASSESSMENT — VISUAL ACUITY
OS_SC: 20/20
CORRECTION_TYPE: GLASSES
OS_CC: 20/25-2
OS_SC+: -1
OS_SC: 20/100
OD_SC: 20/25
METHOD: SNELLEN - LINEAR
OD_CC: 20/25-1
OD_SC: 20/200

## 2017-09-14 ASSESSMENT — KERATOMETRY
OD_AXISANGLE2_DEGREES: 4
OD_K1POWER_DIOPTERS: 44.25
OD_K2POWER_DIOPTERS: 45.00
OS_AXISANGLE2_DEGREES: 163
OS_K1POWER_DIOPTERS: 45.00
OS_K2POWER_DIOPTERS: 45.25

## 2017-09-14 ASSESSMENT — REFRACTION_WEARINGRX
OS_ADD: +1.50
OS_AXIS: 178
OS_CYLINDER: +0.75
OS_SPHERE: +0.75
SPECS_TYPE: OTC'S
OD_ADD: +1.50
OD_SPHERE: +2.00
OD_SPHERE: +0.75
OD_AXIS: 011
OS_SPHERE: +2.00
OD_CYLINDER: +0.50

## 2017-09-14 ASSESSMENT — REFRACTION_MANIFEST
OS_SPHERE: +1.25
OS_AXIS: 180
OD_SPHERE: +1.75
OD_ADD: +1.50
OS_SPHERE: +0.75
OD_CYLINDER: +0.50
OD_AXIS: 175
OD_SPHERE: +1.25
OD_CYLINDER: +0.25
METHOD_AUTOREFRACTION: 1
OS_CYLINDER: +0.25
OD_AXIS: 156
OS_ADD: +1.50

## 2017-09-14 ASSESSMENT — CUP TO DISC RATIO
OS_RATIO: 0.3
OD_RATIO: 0.3

## 2017-09-14 ASSESSMENT — EXTERNAL EXAM - RIGHT EYE: OD_EXAM: NORMAL

## 2017-09-14 ASSESSMENT — TONOMETRY
IOP_METHOD: APPLANATION
OD_IOP_MMHG: 12
OS_IOP_MMHG: 12

## 2017-09-14 ASSESSMENT — SLIT LAMP EXAM - LIDS
COMMENTS: 2+ MEIBOMIAN GLAND DYSFUNCTION, 1+ BLEPHARITIS
COMMENTS: 1+ MEIBOMIAN GLAND DYSFUNCTION, 1+ BLEPHARITIS

## 2017-09-14 ASSESSMENT — CONF VISUAL FIELD
OS_NORMAL: 1
OD_NORMAL: 1
METHOD: COUNTING FINGERS

## 2017-09-14 NOTE — PROGRESS NOTES
Chief Complaint   Patient presents with     COMPREHENSIVE EYE EXAM     yearly         Last Eye Exam: 1/13/2016  Dilated Previously: Yes    What are you currently using to see?  Readers, wears them a lot of the time        Distance Vision Acuity: Noticed gradual change in both eyes, using the readers as regular glasses as needed     Near Vision Acuity: Not satisfied, using the readers for all near tasks      Eye Comfort: Eyes usually ok, watery. Currently tired and they feel dry and a little itchy   Do you use eye drops? : Yes: Uses drops as needed. 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       OBJECTIVE: See Ophthalmology exam    ASSESSMENT:    ICD-10-CM    1. Hyperopia with astigmatism and presbyopia, bilateral H52.03 EYE EXAM (SIMPLE-NONBILLABLE)    H52.203 REFRACTION    H52.4    2. Dry eyes, bilateral H04.123 EYE EXAM (SIMPLE-NONBILLABLE)     REFRACTION     polyethylene glycol 0.4%- propylene glycol 0.3% (SYSTANE ULTRA) 0.4-0.3 % SOLN ophthalmic solution      PLAN:     Patient Instructions   Patient was advised of today's exam findings.  Fill glasses prescription- options discussed   Use over the counter artificial tears 2-4 times a day (Blink Tears, **Systane Ultra or Refresh Optive)  Return in 1 year for eye exam    Beba Costa O.D.  [facility]   [address] [place], [place] [id number] [phone]

## 2017-09-14 NOTE — PATIENT INSTRUCTIONS
Patient was advised of today's exam findings.  Fill glasses prescription- options discussed   Use over the counter artificial tears 2-4 times a day (Blink Tears, **Systane Ultra or Refresh Optive)  Return in 1 year for eye exam    Beba Costa O.D.  [facility]   [address] [place] [address] [phone]

## 2017-09-14 NOTE — MR AVS SNAPSHOT
After Visit Summary   9/14/2017    Salvatore Kim    MRN: [id number]           Patient Information     Date Of Birth          1970        Visit Information        Provider Department      9/14/2017 10:40 AM Beba Costa OD [facility]        Care Instructions    Patient was advised of today's exam findings.  Fill glasses prescription- options discussed   Use over the counter artificial tears 2-4 times a day (Blink Tears, **Systane Ultra or Refresh Optive)  Return in 1 year for eye exam    Beba Costa O.D.  [facility]   [address] [place] [address]  [phone]            Follow-ups after your visit        Your next 10 appointments already scheduled     Sep 14, 2017  1:30 PM CDT   DANNY Extremity with Roldan Bejarano PT   [facility] ([facility] [place])    [address]  [place] [address] [phone]            Sep 21, 2017  9:00 AM CDT   DANNY Extremity with Roldan Bejarano PT   [facility] ([facility] [place])    [address]  [place] [address] [phone]              Who to contact     If you have questions or need follow up information about today's clinic visit or your schedule please contact [facility] directly at [phone].  Normal or non-critical lab and imaging results will be communicated to you by LoveIthart, letter or phone within 4 business days after the clinic has received the results. If you do not hear from us within 7 days, please contact the clinic through LoveIthart or phone. If you have a critical or abnormal lab result, we will notify you by phone as soon as possible.  Submit refill requests through N4MD or call your pharmacy and they will forward the refill request to us. Please allow 3 business days for your refill to be completed.          Additional Information About Your Visit        LoveIthart Information     N4MD gives you secure access to your electronic health record. If you  see a primary care provider, you can also send messages to your care team and make appointments. If you have questions, please call your primary care clinic.  If you do not have a primary care provider, please call [phone] and they will assist you.        Care EveryWhere ID     This is your Care EveryWhere ID. This could be used by other organizations to access your [place] medical records  [id number]         Blood Pressure from Last 3 Encounters:   06/08/17 112/79   03/16/17 114/73   12/15/16 110/64    Weight from Last 3 Encounters:   06/08/17 111.4 kg (245 lb 9.6 oz)   03/16/17 129.5 kg (285 lb 6.4 oz)   12/15/16 133.4 kg (294 lb)              Today, you had the following     No orders found for display       Primary Care Provider Office Phone # Fax #    Nghia Momin PA-C [phone] [address] [facility] [address]        Equal Access to Services     DOMINIC CASTANEDA : Hadii aad ku hadasho Soomaali, waaxda luqadaha, qaybta kaalmada adeegyada, waxay idiin hayannetten raudel arzolaaralawrence moulton . So [facility] [phone].    ATENCIÓN: Si habla español, tiene a lucas disposición servicios gratuitos de asistencia lingüística. Ll[facility] [phone].    We comply with applicable federal civil rights laws and [place] laws. We do not discriminate on the basis of race, color, national origin, age, disability sex, sexual orientation or gender identity.            Thank you!     Thank you for choosing [facility]  for your care. Our goal is always to provide you with excellent care. Hearing back from our patients is one way we can continue to improve our services. Please take a few minutes to complete the written survey that you may receive in the mail after your visit with us. Thank you!             Your Updated Medication List - Protect others around you: Learn how to safely use, store and throw away your medicines at www.disposemymeds.org.          This list is accurate as of: 9/14/17  11:41 AM.  Always use your most recent med list.                   Brand Name Dispense Instructions for use Diagnosis    EPINEPHrine 0.3 MG/0.3ML injection 2-pack    EPIPEN/ADRENACLICK/or ANY BX GENERIC EQUIV    2 each    Inject 0.3 mLs (0.3 mg) into the muscle once as needed for anaphylaxis    Hives, Allergic reaction, subsequent encounter       NICORETTE MINI MT           sildenafil 100 MG cap/tab    REVATIO/VIAGRA    10 tablet    Take 1 tablet (100 mg) by mouth daily as needed for erectile dysfunction    Impotence

## 2017-09-21 ENCOUNTER — THERAPY VISIT (OUTPATIENT)
Dept: PHYSICAL THERAPY | Facility: CLINIC | Age: 47
End: 2017-09-21
Payer: OTHER MISCELLANEOUS

## 2017-09-21 DIAGNOSIS — M25.511 RIGHT SHOULDER PAIN, UNSPECIFIED CHRONICITY: ICD-10-CM

## 2017-09-21 DIAGNOSIS — Z98.890 S/P SHOULDER SURGERY: ICD-10-CM

## 2017-09-21 PROCEDURE — 97110 THERAPEUTIC EXERCISES: CPT | Mod: GP | Performed by: PHYSICAL THERAPIST

## 2017-10-05 ENCOUNTER — THERAPY VISIT (OUTPATIENT)
Dept: PHYSICAL THERAPY | Facility: CLINIC | Age: 47
End: 2017-10-05
Payer: OTHER MISCELLANEOUS

## 2017-10-05 DIAGNOSIS — Z98.890 S/P SHOULDER SURGERY: ICD-10-CM

## 2017-10-05 DIAGNOSIS — M25.511 RIGHT SHOULDER PAIN, UNSPECIFIED CHRONICITY: ICD-10-CM

## 2017-10-05 PROCEDURE — 97110 THERAPEUTIC EXERCISES: CPT | Mod: GP | Performed by: PHYSICAL THERAPIST

## 2017-10-05 PROCEDURE — 97112 NEUROMUSCULAR REEDUCATION: CPT | Mod: GP | Performed by: PHYSICAL THERAPIST

## 2017-10-12 ENCOUNTER — THERAPY VISIT (OUTPATIENT)
Dept: PHYSICAL THERAPY | Facility: CLINIC | Age: 47
End: 2017-10-12
Payer: OTHER MISCELLANEOUS

## 2017-10-12 DIAGNOSIS — Z98.890 S/P SHOULDER SURGERY: ICD-10-CM

## 2017-10-12 DIAGNOSIS — M25.511 RIGHT SHOULDER PAIN, UNSPECIFIED CHRONICITY: ICD-10-CM

## 2017-10-12 PROCEDURE — 97110 THERAPEUTIC EXERCISES: CPT | Mod: GP | Performed by: PHYSICAL THERAPIST

## 2017-10-12 PROCEDURE — 97112 NEUROMUSCULAR REEDUCATION: CPT | Mod: GP | Performed by: PHYSICAL THERAPIST

## 2017-10-12 NOTE — PROGRESS NOTES
Subjective:    HPI                    Objective:    System    Physical Exam    General     ROS    Assessment/Plan:      PROGRESS  REPORT    Progress reporting period is from 9/5/17 to 10/5/17.     SUBJECTIVE  Subjective: pt reports shoulder is aching today but has been using it more with normal daily tasks. pt admits he does all exercises daily including strengthening, time spent discussing HEP and how he may be doing too much causing increased shoulder pain recently.   Current Pain level: 4/10   Initial Pain level: 9/10   Changes in function: No changes noted in function since last SOAP note   Adverse reactions: None;   ,     The objective findings are from DOS 10/17/17.    OBJECTIVE  Objective: AROM R shoulder flx: 155degs abd:148degs ER: 65degs      ASSESSMENT/PLAN  Updated problem list and treatment plan: Diagnosis 1:  S/p R RTCR and extensive debridement  STG/LTGs have been met or progress has been made towards goals:  Yes, progressing towards painfree active reaching.  Assessment of Progress: The patient's condition is improving.  The patient's condition has potential to improve.  Self Management Plans:  Patient has been instructed in a home treatment program.  I have re-evaluated this patient and find that the nature, scope, duration and intensity of the therapy is appropriate for the medical condition of the patient.  Salvatore continues to require the following intervention to meet STG and LTG's: PT  The patient is returning to your office for a recheck appointment.    Recommendations:  This patient would benefit from continued therapy.     Frequency:  1 X week, once daily  Duration:  for 12 weeks        Please refer to the daily flowsheet for treatment today, total treatment time and time spent performing 1:1 timed codes.

## 2017-10-12 NOTE — LETTER
[facility]  [address]  [place] [address]  [phone]    2017    Re: Salvatore Kim   :   1970  MRN:  [id number]   REFERRING PHYSICIAN:   [facility]    Date of Initial Evaluation:  17  Visits:  Rxs Used: 10  Reason for Referral:     S/P shoulder surgery  Right shoulder pain, unspecified chronicity    PROGRESS  REPORT  Progress reporting period is from 17 to 10/5/17.     SUBJECTIVE  Subjective: pt reports shoulder is aching today but has been using it more with normal daily tasks. pt admits he does all exercises daily including strengthening, time spent discussing HEP and how he may be doing too much causing increased shoulder pain recently.   Current Pain level: 4/10   Initial Pain level: 9/10   Changes in function: No changes noted in function since last SOAP note   Adverse reactions: None  The objective findings are from DOS 10/17/17.    OBJECTIVE  Objective: AROM R shoulder flx: 155degs abd:148degs ER: 65degs      ASSESSMENT/PLAN  Updated problem list and treatment plan: Diagnosis 1:  S/p R RTCR and extensive debridement  STG/LTGs have been met or progress has been made towards goals:  Yes, progressing towards painfree active reaching.  Assessment of Progress: The patient's condition is improving.  The patient's condition has potential to improve.  Self Management Plans:  Patient has been instructed in a home treatment program.  I have re-evaluated this patient and find that the nature, scope, duration and intensity of the therapy is appropriate for the medical condition of the patient.  Salvatore continues to require the following intervention to meet STG and LTG's: PT  The patient is returning to your office for a recheck appointment.      Recommendations:  This patient would benefit from continued therapy.     Frequency:  1 X week, once daily  Duration:  for 12 weeks    Thank you for your  referral.    INQUIRIES  Therapist: Roldan Bejarano DPT [facility]  [address] [place] [address]  Phone: [phone]  Fax: [phone]

## 2017-10-12 NOTE — MR AVS SNAPSHOT
After Visit Summary   10/12/2017    Salvatore Kim    MRN: [id number]           Patient Information     Date Of Birth          1970        Visit Information        Provider Department      10/12/2017 8:20 AM Roldan Bejarano, PT [facility]        Today's Diagnoses     S/P shoulder surgery        Right shoulder pain, unspecified chronicity           Follow-ups after your visit        Your next 10 appointments already scheduled     Oct 17, 2017  9:00 AM CDT   DANNY Extremity with Roldan Bejarano, PT   [facility] [facility] ([facility] [place]    [address]  [place] [address]   [phone]            Oct 26, 2017  9:00 AM CDT   DANNY Extremity with Roldan Bejarano, PT   [facility] [facility] ([facility] [place])    [address]  [place] [address]   [phone]            Nov 02, 2017  9:00 AM CDT   DANNY Extremity with Roldan Bejarano, PT   [facility] ([facility] [place])    [address]  [place] [address]   [phone]              Who to contact     If you have questions or need follow up information about today's clinic visit or your schedule please contact [facility] directly at [phone].  Normal or non-critical lab and imaging results will be communicated to you by Powerwave Technologieshart, letter or phone within 4 business days after the clinic has received the results. If you do not hear from us within 7 days, please contact the clinic through Powerwave Technologieshart or phone. If you have a critical or abnormal lab result, we will notify you by phone as soon as possible.  Submit refill requests through Affectv or call your pharmacy and they will forward the refill request to us. Please allow 3 business days for your refill to be completed.          Additional Information About Your Visit        [employer] Information     Affectv gives you secure access to your electronic health record. If you see a primary care provider, you can also send messages to your care team  and make appointments. If you have questions, please call your primary care clinic.  If you do not have a primary care provider, please call [phone] and they will assist you.        Care EveryWhere ID     This is your Care EveryWhere ID. This could be used by other organizations to access your [place] medical records  [id number]         Blood Pressure from Last 3 Encounters:   06/08/17 112/79   03/16/17 114/73   12/15/16 110/64    Weight from Last 3 Encounters:   06/08/17 111.4 kg (245 lb 9.6 oz)   03/16/17 129.5 kg (285 lb 6.4 oz)   12/15/16 133.4 kg (294 lb)              We Performed the Following     [facility] PROGRESS NOTES REPORT     NEUROMUSCULAR RE-EDUCATION     THERAPEUTIC EXERCISES        Primary Care Provider Office Phone # Fax #    Nghia Momin PA-C [phone] [address] [facility] [address]        Equal Access to Services     [facility] Center[facility]QUELINE : Hadii aad ku hadasho Soomaali, waaxda luqadaha, qaybta kaalmada adeegyada, waxay idiin hayannetten raudel moulton . So [facility] [phone].    ATENCIÓN: Si misty daugherty, tiene a lucas disposición servicios gratuitos de asistencia lingüística. Llame al [phone].    We comply with applicable federal civil rights laws and [place] laws. We do not discriminate on the basis of race, color, national origin, age, disability, sex, sexual orientation, or gender identity.            Thank you!     Thank you for choosing [facility]  for your care. Our goal is always to provide you with excellent care. Hearing back from our patients is one way we can continue to improve our services. Please take a few minutes to complete the written survey that you may receive in the mail after your visit with us. Thank you!             Your Updated Medication List - Protect others around you: Learn how to safely use, store and throw away your medicines at www.disposemymeds.org.          This list is accurate as of: 10/12/17  9:06 AM.   Always use your most recent med list.                   Brand Name Dispense Instructions for use Diagnosis    EPINEPHrine 0.3 MG/0.3ML injection 2-pack    EPIPEN/ADRENACLICK/or ANY BX GENERIC EQUIV    2 each    Inject 0.3 mLs (0.3 mg) into the muscle once as needed for anaphylaxis    Hives, Allergic reaction, subsequent encounter       NICORETTE MINI MT           polyethylene glycol 0.4%- propylene glycol 0.3% 0.4-0.3 % Soln ophthalmic solution    SYSTANE ULTRA     Place 1 drop into both eyes 2 times daily    Dry eyes, bilateral       sildenafil 100 MG tablet    VIAGRA    10 tablet    Take 1 tablet (100 mg) by mouth daily as needed for erectile dysfunction    Impotence

## 2017-10-17 ENCOUNTER — THERAPY VISIT (OUTPATIENT)
Dept: PHYSICAL THERAPY | Facility: CLINIC | Age: 47
End: 2017-10-17
Payer: OTHER MISCELLANEOUS

## 2017-10-17 DIAGNOSIS — Z98.890 S/P SHOULDER SURGERY: ICD-10-CM

## 2017-10-17 DIAGNOSIS — M25.511 RIGHT SHOULDER PAIN, UNSPECIFIED CHRONICITY: ICD-10-CM

## 2017-10-17 PROCEDURE — 97110 THERAPEUTIC EXERCISES: CPT | Mod: GP | Performed by: PHYSICAL THERAPIST

## 2017-10-17 NOTE — LETTER
"[facility]  [address]  [place] [address]  [phone]    2017    Re: Salvatore Kim   :   1970  MRN:  [id number]   REFERRING PHYSICIAN:   [facility]    Date of Initial Evaluation:  17  Visits:  Rxs Used: 11  Reason for Referral:     S/P shoulder surgery  Right shoulder pain, unspecified chronicity    PROGRESS  REPORT  Progress reporting period is from 17 to 10/17/17.     SUBJECTIVE  Subjective: pt reports he has had moderate to severe pain in R shoulder since previous session, admits driving is hurting and he leans on R arm when driving. admits his R anterior shoulder is painful when brushing teeth. admits he has had more \"popping\" in car when reaching for radio. overall he says he \"babies it\" more. today feels like pendulums are sore and that shoulder is \"pulling out of socket a little bit.\"   Current Pain level: 3/10   Initial Pain level: 9/10   Changes in function: No changes noted in function since last SOAP note   Adverse reactions: None  The objective findings are from DOS 10/17/17.    OBJECTIVE  Objective: AROM R shoulder flx:155degs abd: 154degs ER:67degs.       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  Assessment of Progress: The patient's condition is improving.  The patient's condition has potential to improve.  Self Management Plans:  Patient has been instructed in a home treatment program.  I have re-evaluated this patient and find that the nature, scope, duration and intensity of the therapy is appropriate for the medical condition of the patient.  Salvatore continues to require the following intervention to meet STG and LTG's: PT  The patient is returning to your office for a recheck " appointment.    Recommendations:  Salvatore has been improving R shoulder active ROM, is about 80-85% in regards to flx, abd, ER. He recently has reported increased R shoulder pain and clicking that is fairly constant and worse with reaching tasks like driving and adjusting radio in car. This increased pain has occurred since initiating gentle supine cuff strengthening two weeks ago and after last week initiating prone periscapular strengthening. Today we held on all strengthening with focus of painfree ROM which resulted again in a normal increase of active ROM over last week measures. Currently we have one more visit remaining on your previous PT order, he would benefit from continued PT for R shoulder strength and ROM, please update order as you see appropriate. Thank you.    Thank you for your referral.    INQUIRIES  Therapist: Roldan Bejarano DPT [facility]  [address]  [place] [address]  Phone: [phone]  Fax: [phone]

## 2017-10-17 NOTE — MR AVS SNAPSHOT
After Visit Summary   10/17/2017    Salvatore Kim    MRN: [id number]           Patient Information     Date Of Birth          1970        Visit Information        Provider Department      10/17/2017 9:00 AM Roldan Bejarano, PT [facility]        Today's Diagnoses     S/P shoulder surgery        Right shoulder pain, unspecified chronicity           Follow-ups after your visit        Your next 10 appointments already scheduled     Oct 26, 2017  9:00 AM CDT   DANNY Extremity with Roldan Bejarano, PT   [facility]    [address]  [place] [address]   [phone]            Nov 02, 2017  9:00 AM CDT   DANNY Extremity with Roldan Bejarano, PT   [facility]    [address]  [place] [address] [phone]              Who to contact     If you have questions or need follow up information about today's clinic visit or your schedule please contact [facility] directly at [phone].  Normal or non-critical lab and imaging results will be communicated to you by ClickFactshart, letter or phone within 4 business days after the clinic has received the results. If you do not hear from us within 7 days, please contact the clinic through "[employer]t or phone. If you have a critical or abnormal lab result, we will notify you by phone as soon as possible.  Submit refill requests through [employer] or call your pharmacy and they will forward the refill request to us. Please allow 3 business days for your refill to be completed.          Additional Information About Your Visit        ClickFactshart Information     [employer] gives you secure access to your electronic health record. If you see a primary care provider, you can also send messages to your care team and make appointments. If you have questions, please call your primary care clinic.  If you do not have a primary care provider, please call [phone] and they will assist  you.        Care EveryWhere ID     This is your Care EveryWhere ID. This could be used by other organizations to access your [place] medical records  [id number]         Blood Pressure from Last 3 Encounters:   06/08/17 112/79   03/16/17 114/73   12/15/16 110/64    Weight from Last 3 Encounters:   06/08/17 111.4 kg (245 lb 9.6 oz)   03/16/17 129.5 kg (285 lb 6.4 oz)   12/15/16 133.4 kg (294 lb)              We Performed the Following     [facility] PROGRESS NOTES REPORT     THERAPEUTIC EXERCISES        Primary Care Provider Office Phone # Fax #    Nghia Harshad Momin PA-C [phone] [address]  [facility] [address]        Equal Access to Services     DOMINIC CASTANEDA : Hadii katherine gore hadasho Soomaali, waaxda luqadaha, qaybta kaalmada adeegyada, waxchapin roberson haykatie moulton . So [facility] [phone].    ATENCIÓN: Si habla español, tiene a lucas disposición servicios gratuitos de asistencia lingüística. Llame al [phone].    We comply with applicable federal civil rights laws and [place] laws. We do not discriminate on the basis of race, color, national origin, age, disability, sex, sexual orientation, or gender identity.            Thank you!     Thank you for choosing [facility]  for your care. Our goal is always to provide you with excellent care. Hearing back from our patients is one way we can continue to improve our services. Please take a few minutes to complete the written survey that you may receive in the mail after your visit with us. Thank you!             Your Updated Medication List - Protect others around you: Learn how to safely use, store and throw away your medicines at www.disposemymeds.org.          This list is accurate as of: 10/17/17 10:43 AM.  Always use your most recent med list.                   Brand Name Dispense Instructions for use Diagnosis    EPINEPHrine 0.3 MG/0.3ML injection 2-pack    EPIPEN/ADRENACLICK/or ANY BX GENERIC EQUIV    2 each     Inject 0.3 mLs (0.3 mg) into the muscle once as needed for anaphylaxis    Hives, Allergic reaction, subsequent encounter       NICORETTE MINI MT           polyethylene glycol 0.4%- propylene glycol 0.3% 0.4-0.3 % Soln ophthalmic solution    SYSTANE ULTRA     Place 1 drop into both eyes 2 times daily    Dry eyes, bilateral       sildenafil 100 MG tablet    VIAGRA    10 tablet    Take 1 tablet (100 mg) by mouth daily as needed for erectile dysfunction    Impotence

## 2017-10-17 NOTE — PROGRESS NOTES
"Subjective:    HPI                    Objective:    System    Physical Exam    General     ROS    Assessment/Plan:      PROGRESS  REPORT    Progress reporting period is from 9/5/17 to 10/17/17.     SUBJECTIVE  Subjective: pt reports he has had moderate to severe pain in R shoulder since previous session, admits driving is hurting and he leans on R arm when driving. admits his R anterior shoulder is painful when brushing teeth. admits he has had more \"popping\" in car when reaching for radio. overall he says he \"babies it\" more. today feels like pendulums are sore and that shoulder is \"pulling out of socket a little bit.\"   Current Pain level: 3/10   Initial Pain level: 9/10   Changes in function: No changes noted in function since last SOAP note   Adverse reactions: None;   ,     The objective findings are from DOS 10/17/17.    OBJECTIVE  Objective: AROM R shoulder flx:155degs abd: 154degs ER:67degs.       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  Assessment of Progress: The patient's condition is improving.  The patient's condition has potential to improve.  Self Management Plans:  Patient has been instructed in a home treatment program.  I have re-evaluated this patient and find that the nature, scope, duration and intensity of the therapy is appropriate for the medical condition of the patient.  Salvatore continues to require the following intervention to meet STG and LTG's: PT  The patient is returning to your office for a recheck appointment.    Recommendations:  Salvatore has been improving R shoulder active ROM, is about 80-85% in regards to flx, abd, ER. He recently has reported increased R shoulder pain and clicking that is fairly constant and worse with reaching tasks like driving and adjusting radio in " car. This increased pain has occurred since initiating gentle supine cuff strengthening two weeks ago and after last week initiating prone periscapular strengthening. Today we held on all strengthening with focus of painfree ROM which resulted again in a normal increase of active ROM over last week measures. Currently we have one more visit remaining on your previous PT order, he would benefit from continued PT for R shoulder strength and ROM, please update order as you see appropriate. Thank you.    Please refer to the daily flowsheet for treatment today, total treatment time and time spent performing 1:1 timed codes.

## 2017-11-03 PROBLEM — M25.511 RIGHT SHOULDER PAIN, UNSPECIFIED CHRONICITY: Status: RESOLVED | Noted: 2017-06-08 | Resolved: 2017-11-03

## 2017-11-03 PROBLEM — Z98.890 S/P SHOULDER SURGERY: Status: RESOLVED | Noted: 2017-08-02 | Resolved: 2017-11-03

## 2018-01-19 ENCOUNTER — OFFICE VISIT (OUTPATIENT)
Dept: ORTHOPEDICS | Facility: CLINIC | Age: 48
End: 2018-01-19
Payer: OTHER MISCELLANEOUS

## 2018-01-19 ENCOUNTER — RADIANT APPOINTMENT (OUTPATIENT)
Dept: GENERAL RADIOLOGY | Facility: CLINIC | Age: 48
End: 2018-01-19
Attending: FAMILY MEDICINE
Payer: OTHER MISCELLANEOUS

## 2018-01-19 VITALS
DIASTOLIC BLOOD PRESSURE: 88 MMHG | SYSTOLIC BLOOD PRESSURE: 139 MMHG | BODY MASS INDEX: 41.22 KG/M2 | HEIGHT: 68 IN | WEIGHT: 272 LBS

## 2018-01-19 DIAGNOSIS — S99.911A INJURY OF RIGHT ANKLE, INITIAL ENCOUNTER: Primary | ICD-10-CM

## 2018-01-19 DIAGNOSIS — S99.911A INJURY OF RIGHT ANKLE, INITIAL ENCOUNTER: ICD-10-CM

## 2018-01-19 PROCEDURE — 73610 X-RAY EXAM OF ANKLE: CPT | Mod: RT

## 2018-01-19 PROCEDURE — 99203 OFFICE O/P NEW LOW 30 MIN: CPT | Performed by: FAMILY MEDICINE

## 2018-01-19 NOTE — LETTER
"    2018         RE: Salvatore Kim  [address]  [place] [address]        Dear Colleague,    Thank you for referring your patient, Salvatore Kim, to the [facility] [place]. Please see a copy of my visit note below.    Salvatore Kim  :  1970  DOS: 2018  MRN: [id number]    Sports Medicine Clinic Visit    PCP: Nghia Momin    Salvatore Kim is a 47 year old male who is seen as an AIC patient presenting with right ankle injury.    Injury: At work - walking and slipped on ice, possibly everting right ankle earlier this AM (18).  Pain located over right anterior ankle, nonradiating.  Additional Features:  Positive: swelling, bruising and painful WB.  Symptoms are better with Rest.  Symptoms are worse with: walking, lateral movements.  Other evaluation and/or treatments so far consists of: Ice and Rest.  Prior imaging: No recent imaging completed.  Prior History of related problems: none    Social History: works as      Review of Systems  Musculoskeletal: as above  Remainder of review of systems is negative including constitutional, CV, pulmonary, GI, Skin and Neurologic except as noted in HPI or medical history.    Past Medical History:   Diagnosis Date     Allergies      Arthritis      Depression, anxiety      ED (erectile dysfunction)      Hyperlipidemia      Metabolic syndrome      Past Surgical History:   Procedure Laterality Date     COLONOSCOPY  2013    Procedure: COLONOSCOPY;  COLONOSCOPY, EXAM UNDER ANESTHESIA,  FISTULOTOMY;  Surgeon: Maxwell Israel MD;  Location: [facility]     EXAM UNDER ANESTHESIA ANUS  2013    Procedure: EXAM UNDER ANESTHESIA ANUS;;  Surgeon: Maxwell Israel MD;  Location: [facility]     FISTULOTOMY RECTUM  2013    Procedure: FISTULOTOMY RECTUM;;  Surgeon: Maxwell Israel MD;  Location: [facility]     HEMORRHOID SURGERY       RELEASE CARPAL TUNNEL      Rt     Objective  /88  Ht 5' 8\" (1.727 m)  Wt 272 lb " "(123.4 kg)  BMI 41.36 kg/m2    General: healthy, alert and in no distress    HEENT: no scleral icterus or conjunctival erythema   Skin: no suspicious lesions or rash. No jaundice.   CV: regular rhythm by palpation, 2+ distal pulses, no pedal edema    Resp: normal respiratory effort without conversational dyspnea   Psych: normal mood and affect    Gait: antalgic, appropriate coordination and balance   Neuro: normal light touch sensory exam of the extremities. Motor strength as noted below     Right Ankle/Foot Exam:    Inspection:       no visible ecchymosis       no visible effusion       edema noted mild superolateral ankle and over lateral malleolus    Foot inspection:       no deformity noted    ROM:        full ROM with dorsiflexion, plantarflexion, inversion and eversion       limited by pain with resisted eversion and dorsiflexion    Tender:       ATFL       Mild lateral malleolus, ATiFL    Non-Tender:       remainder of foot and ankle       deltoid ligament       posterior edge of lateral malleolus       proximal 5th metatarsal       dorsal tibiotalar joint       tarsal navicular       medial malleolus       distal tibiofibular joint       proximal 1st and 2nd intermetatarsal ligament       tibialis posterior tendon, posterior to medial malleolus       peroneal tendon sheath    Skin:       well perfused       capillary refill less than 2 seconds    Special Tests:       neg (-) anterior drawer        neg (-) talar tilt        positive (+) external rotation testing, mild + tib-fib squeeze    Gait:       antalgic gait    Proprioception:        deferred      Radiology:  XR images independently visualized and reviewed with patient today in clinic  No clear acute findings, no significant degenerative changes, will follow final radiology read    Assessment:  1. Injury of right ankle, initial encounter        Plan:  Discussed the assessment with the patient.  Follow up: 2 weeks  Letter provided for work:  \"Salvatore was " "seen in my office today for a right ankle injury that occurred at work this morning.  He has a high ankle sprain which will improve over time, likely in 6-8 weeks total.  He would like to continue working.  I will allow him to work as long as he can do so without significant pain and as long as he is safe.  For the next 2 weeks, lift/carry more than 20 pounds may be difficult and should be limited and modified or eliminated if painful.  I suspect ladders and climbing will also be difficult and potentially unsafe and recommend limiting that as needed as well.  I will see him again in 2 weeks.  Use of crutches, cane or other assistive device should be used to protect weight bearing as needed over the first week.  Updated recommendations will be provided as he progresses.\"  RICE reviewed  assistive device and brace use reviewed  We discussed modified progressive pain-free activity as tolerated  Home handouts provided and supportive care reviewed  All questions were answered today  Contact us with additional questions or concerns  Signs and sx of concern reviewed      Behzad Mclaughlin DO, CADOMINGA  Primary Care Sports Medicine  [facility]               Again, thank you for allowing me to participate in the care of your patient.        Sincerely,        Behzad Mclaughlin DO    "

## 2018-01-19 NOTE — PROGRESS NOTES
"Salvatore Kim  :  1970  DOS: 2018  MRN: [id number]    Sports Medicine Clinic Visit    PCP: Nghia Momin    Salvatore Kim is a 47 year old male who is seen as an AIC patient presenting with right ankle injury.    Injury: At work - walking and slipped on ice, possibly everting right ankle earlier this AM (18).  Pain located over right anterior ankle, nonradiating.  Additional Features:  Positive: swelling, bruising and painful WB.  Symptoms are better with Rest.  Symptoms are worse with: walking, lateral movements.  Other evaluation and/or treatments so far consists of: Ice and Rest.  Prior imaging: No recent imaging completed.  Prior History of related problems: none    Social History: works as      Review of Systems  Musculoskeletal: as above  Remainder of review of systems is negative including constitutional, CV, pulmonary, GI, Skin and Neurologic except as noted in HPI or medical history.    Past Medical History:   Diagnosis Date     Allergies      Arthritis      Depression, anxiety      ED (erectile dysfunction)      Hyperlipidemia      Metabolic syndrome      Past Surgical History:   Procedure Laterality Date     COLONOSCOPY  2013    Procedure: COLONOSCOPY;  COLONOSCOPY, EXAM UNDER ANESTHESIA,  FISTULOTOMY;  Surgeon: Maxwell Israel MD;  Location: [facility]     EXAM UNDER ANESTHESIA ANUS  2013    Procedure: EXAM UNDER ANESTHESIA ANUS;;  Surgeon: Maxwell Israel MD;  Location: [facility]     FISTULOTOMY RECTUM  2013    Procedure: FISTULOTOMY RECTUM;;  Surgeon: Maxwell Israel MD;  Location: [facility]     HEMORRHOID SURGERY       RELEASE CARPAL TUNNEL      Rt     Objective  /88  Ht 5' 8\" (1.727 m)  Wt 272 lb (123.4 kg)  BMI 41.36 kg/m2    General: healthy, alert and in no distress    HEENT: no scleral icterus or conjunctival erythema   Skin: no suspicious lesions or rash. No jaundice.   CV: regular rhythm by palpation, 2+ distal pulses, no pedal edema  " "  Resp: normal respiratory effort without conversational dyspnea   Psych: normal mood and affect    Gait: antalgic, appropriate coordination and balance   Neuro: normal light touch sensory exam of the extremities. Motor strength as noted below     Right Ankle/Foot Exam:    Inspection:       no visible ecchymosis       no visible effusion       edema noted mild superolateral ankle and over lateral malleolus    Foot inspection:       no deformity noted    ROM:        full ROM with dorsiflexion, plantarflexion, inversion and eversion       limited by pain with resisted eversion and dorsiflexion    Tender:       ATFL       Mild lateral malleolus, ATiFL    Non-Tender:       remainder of foot and ankle       deltoid ligament       posterior edge of lateral malleolus       proximal 5th metatarsal       dorsal tibiotalar joint       tarsal navicular       medial malleolus       distal tibiofibular joint       proximal 1st and 2nd intermetatarsal ligament       tibialis posterior tendon, posterior to medial malleolus       peroneal tendon sheath    Skin:       well perfused       capillary refill less than 2 seconds    Special Tests:       neg (-) anterior drawer        neg (-) talar tilt        positive (+) external rotation testing, mild + tib-fib squeeze    Gait:       antalgic gait    Proprioception:        deferred      Radiology:  XR images independently visualized and reviewed with patient today in clinic  No clear acute findings, no significant degenerative changes, will follow final radiology read    Assessment:  1. Injury of right ankle, initial encounter        Plan:  Discussed the assessment with the patient.  Follow up: 2 weeks  Letter provided for work:  \"Salvatore was seen in my office today for a right ankle injury that occurred at work this morning.  He has a high ankle sprain which will improve over time, likely in 6-8 weeks total.  He would like to continue working.  I will allow him to work as long as he can " "do so without significant pain and as long as he is safe.  For the next 2 weeks, lift/carry more than 20 pounds may be difficult and should be limited and modified or eliminated if painful.  I suspect ladders and climbing will also be difficult and potentially unsafe and recommend limiting that as needed as well.  I will see him again in 2 weeks.  Use of crutches, cane or other assistive device should be used to protect weight bearing as needed over the first week.  Updated recommendations will be provided as he progresses.\"  RICE reviewed  assistive device and brace use reviewed  We discussed modified progressive pain-free activity as tolerated  Home handouts provided and supportive care reviewed  All questions were answered today  Contact us with additional questions or concerns  Signs and sx of concern reviewed      Behzad Mclaughlin DO, CADOMINGA  Primary Care Sports Medicine  [facility]             "

## 2018-01-19 NOTE — MR AVS SNAPSHOT
After Visit Summary   1/19/2018    Salvatore Kim    MRN: [id number]           Patient Information     Date Of Birth          1970        Visit Information        Provider Department      1/19/2018 11:20 AM Behzad Mclaughlin DO [facility] [place]        Today's Diagnoses     Injury of right ankle, initial encounter    -  1       Follow-ups after your visit        Your next 10 appointments already scheduled     Feb 07, 2018  3:20 PM CST   Return Visit with DO [facility] [place] ([facility] [place])    [address] [address]  [place] [id number] [phone]              Who to contact     If you have questions or need follow up information about today's clinic visit or your schedule please contact [facility] directly at [phone].  Normal or non-critical lab and imaging results will be communicated to you by [employer], letter or phone within 4 business days after the clinic has received the results. If you do not hear from us within 7 days, please contact the clinic through [employer] or phone. If you have a critical or abnormal lab result, we will notify you by phone as soon as possible.  Submit refill requests through [employer] or call your pharmacy and they will forward the refill request to us. Please allow 3 business days for your refill to be completed.          Additional Information About Your Visit        MyChart Information     [employer] gives you secure access to your electronic health record. If you see a primary care provider, you can also send messages to your care team and make appointments. If you have questions, please call your primary care clinic.  If you do not have a primary care provider, please call [phone] and they will assist you.        Care EveryWhere ID     This is your Care EveryWhere ID. This could be used by other organizations to  "access your [facility] medical records  [id number]        Your Vitals Were     Height BMI (Body Mass Index)                5' 8\" (1.727 m) 41.36 kg/m2           Blood Pressure from Last 3 Encounters:   01/19/18 139/88   06/08/17 112/79   03/16/17 114/73    Weight from Last 3 Encounters:   01/19/18 272 lb (123.4 kg)   06/08/17 245 lb 9.6 oz (111.4 kg)   03/16/17 285 lb 6.4 oz (129.5 kg)               Primary Care Provider Office Phone # Fax #    Nghia Harshad Momin PA-C [phone] [address] [facility] [address]        Equal Access to Services     DOMINIC CASTANEDA : Minoo ayono Soterrence, waaxda luqadaha, qaybta kaalmada adeegyada, bronson moulton . So [facility] [place] [phone].    ATENCIÓN: Si habla español, tiene a lucas disposición servicios gratuitos de asistencia lingüística. [facility] [phone].    We comply with applicable federal civil rights laws and [place] laws. We do not discriminate on the basis of race, color, national origin, age, disability, sex, sexual orientation, or gender identity.            Thank you!     Thank you for choosing [facility]  for your care. Our goal is always to provide you with excellent care. Hearing back from our patients is one way we can continue to improve our services. Please take a few minutes to complete the written survey that you may receive in the mail after your visit with us. Thank you!             Your Updated Medication List - Protect others around you: Learn how to safely use, store and throw away your medicines at www.disposemymeds.org.          This list is accurate as of: 1/19/18 11:59 PM.  Always use your most recent med list.                   Brand Name Dispense Instructions for use Diagnosis    EPINEPHrine 0.3 MG/0.3ML injection 2-pack    EPIPEN/ADRENACLICK/or ANY BX GENERIC EQUIV    2 each    Inject 0.3 mLs (0.3 mg) into the muscle once as needed for anaphylaxis    Hives, " Allergic reaction, subsequent encounter       NICORETTE MINI MT           polyethylene glycol 0.4%- propylene glycol 0.3% 0.4-0.3 % Soln ophthalmic solution    SYSTANE ULTRA     Place 1 drop into both eyes 2 times daily    Dry eyes, bilateral       sildenafil 100 MG tablet    VIAGRA    10 tablet    Take 1 tablet (100 mg) by mouth daily as needed for erectile dysfunction    Impotence

## 2018-01-19 NOTE — LETTER
January 19, 2018        Salvatore was seen in my office today for a right ankle injury that occurred at work this morning.  He has a high ankle sprain which will improve over time, likely in 6-8 weeks total.  He would like to continue working.  I will allow him to work as long as he can do so without significant pain and as long as he is safe.  For the next 2 weeks, lift/carry more than 20 pounds may be difficult and should be limited and modified or eliminated if painful.  I suspect ladders and climbing will also be difficult and potentially unsafe and recommend limiting that as needed as well.  I will see him again in 2 weeks.  Use of crutches, cane or other assistive device should be used to protect weight bearing as needed over the first week.  Updated recommendations will be provided as he progresses.        Behzad Trinidad DO, CADOMINGA  Primary Care Sports Medicine  [facility]

## 2018-02-07 ENCOUNTER — OFFICE VISIT (OUTPATIENT)
Dept: ORTHOPEDICS | Facility: CLINIC | Age: 48
End: 2018-02-07
Payer: OTHER MISCELLANEOUS

## 2018-02-07 DIAGNOSIS — S99.911D INJURY OF RIGHT ANKLE, SUBSEQUENT ENCOUNTER: Primary | ICD-10-CM

## 2018-02-07 PROCEDURE — 99213 OFFICE O/P EST LOW 20 MIN: CPT | Performed by: FAMILY MEDICINE

## 2018-02-07 NOTE — LETTER
2018         RE: Salvatore Kim  [address]  [place] [address]        Dear Colleague,    Thank you for referring your patient, Salvatore Kim, to the [facility] [place]. Please see a copy of my visit note below.    Salvatore Kim  :  1970  DOS: 2018  MRN: [id number]    Sports Medicine Clinic Visit    PCP: Nghia Momin    Salvatore Kim is a 47 year old male who is seen as an AIC patient presenting with right ankle injury.    Injury: At work - walking and slipped on ice, possibly everting right ankle earlier this AM (18).  Pain located over right anterior ankle, nonradiating.  Additional Features:  Positive: swelling, bruising and painful WB.  Symptoms are better with Rest.  Symptoms are worse with: walking, lateral movements.  Other evaluation and/or treatments so far consists of: Ice and Rest.  Prior imaging: No recent imaging completed.  Prior History of related problems: none    Social History: works as      Interim History - 2018  Since last visit on 2018 patient has improving right ankle pain.  Reports that has not been wearing ankle brace d/t swelling and not being able to fit into shoe.  Has been able to work with restrictions with only mild discomfort.  No interim injury.         Review of Systems  Musculoskeletal: as above  Remainder of review of systems is negative including constitutional, CV, pulmonary, GI, Skin and Neurologic except as noted in HPI or medical history.    Past Medical History:   Diagnosis Date     Allergies      Arthritis      Depression, anxiety      ED (erectile dysfunction)      Hyperlipidemia      Metabolic syndrome      Past Surgical History:   Procedure Laterality Date     COLONOSCOPY  2013    Procedure: COLONOSCOPY;  COLONOSCOPY, EXAM UNDER ANESTHESIA,  FISTULOTOMY;  Surgeon: Maxwell Israel MD;  Location: [facility]     EXAM UNDER ANESTHESIA [facility]  2013    Procedure: EXAM UNDER  "ANESTHESIA ANUS;;  Surgeon: Maxwell Israel MD;  Location: [facility]     FISTULOTOMY RECTUM  6/7/2013    Procedure: FISTULOTOMY RECTUM;;  Surgeon: Maxwell Israel MD;  Location: [facility]     HEMORRHOID SURGERY       RELEASE CARPAL TUNNEL  2001    Rt     Objective  Ht (P) 5' 8\" (1.727 m)  Wt (P) 271 lb (122.9 kg)  BMI (P) 41.21 kg/m2    General: healthy, alert and in no distress    HEENT: no scleral icterus or conjunctival erythema   Skin: no suspicious lesions or rash. No jaundice.   CV: regular rhythm by palpation, 2+ distal pulses, no pedal edema    Resp: normal respiratory effort without conversational dyspnea   Psych: normal mood and affect    Gait: antalgic, appropriate coordination and balance   Neuro: normal light touch sensory exam of the extremities. Motor strength as noted below     Right Ankle/Foot Exam:    Inspection:       no visible ecchymosis       no visible effusion       edema noted mild superolateral ankle and over lateral malleolus, improving    Foot inspection:       no deformity noted    ROM:        full ROM with dorsiflexion, plantarflexion, inversion and eversion       limited by pain with resisted eversion and dorsiflexion    Tender:       ATFL       Mild lateral malleolus, ATiFL    Non-Tender:       remainder of foot and ankle       deltoid ligament       posterior edge of lateral malleolus       proximal 5th metatarsal       dorsal tibiotalar joint       tarsal navicular       medial malleolus       distal tibiofibular joint       proximal 1st and 2nd intermetatarsal ligament       tibialis posterior tendon, posterior to medial malleolus       peroneal tendon sheath    Skin:       well perfused       capillary refill less than 2 seconds    Special Tests:       neg (-) anterior drawer        neg (-) talar tilt        positive (+) external rotation testing, mild + tib-fib squeeze    Gait:       antalgic gait, improving    Proprioception:        deferred      Radiology:  XR images independently " visualized and reviewed with patient today in clinic  No clear acute findings, no significant degenerative changes, will follow final radiology read    Assessment:  1. Injury of right ankle, subsequent encounter        Plan:  Discussed the assessment with the patient.  Follow up: 2-4 weeks, prn  Improving overall as expected, reported 60% improvement in first 2 weeks  Letter updated for work  PT offered, HEP provided  RICE reviewed, as well as appropriate brace usage  We discussed modified progressive pain-free activity as tolerated  Home handouts provided and supportive care reviewed  All questions were answered today  Contact us with additional questions or concerns  Signs and sx of concern reviewed      Behzad Mclaughlin DO, CAQ  Primary Care Sports Medicine  [facility]               Again, thank you for allowing me to participate in the care of your patient.        Sincerely,        Behzad Mclaughlin DO

## 2018-02-07 NOTE — PROGRESS NOTES
Salvatore Kim  :  1970  DOS: 2018  MRN: [id number]    Sports Medicine Clinic Visit    PCP: Nghia Momin    Salvatore Kim is a 47 year old male who is seen as an AIC patient presenting with right ankle injury.    Injury: At work - walking and slipped on ice, possibly everting right ankle earlier this AM (18).  Pain located over right anterior ankle, nonradiating.  Additional Features:  Positive: swelling, bruising and painful WB.  Symptoms are better with Rest.  Symptoms are worse with: walking, lateral movements.  Other evaluation and/or treatments so far consists of: Ice and Rest.  Prior imaging: No recent imaging completed.  Prior History of related problems: none    Social History: works as      Interim History - 2018  Since last visit on 2018 patient has improving right ankle pain.  Reports that has not been wearing ankle brace d/t swelling and not being able to fit into shoe.  Has been able to work with restrictions with only mild discomfort.  No interim injury.         Review of Systems  Musculoskeletal: as above  Remainder of review of systems is negative including constitutional, CV, pulmonary, GI, Skin and Neurologic except as noted in HPI or medical history.    Past Medical History:   Diagnosis Date     Allergies      Arthritis      Depression, anxiety      ED (erectile dysfunction)      Hyperlipidemia      Metabolic syndrome      Past Surgical History:   Procedure Laterality Date     COLONOSCOPY  2013    Procedure: COLONOSCOPY;  COLONOSCOPY, EXAM UNDER ANESTHESIA,  FISTULOTOMY;  Surgeon: Maxwell Israel MD;  Location: [facility]     EXAM UNDER ANESTHESIA ANUS  2013    Procedure: EXAM UNDER ANESTHESIA ANUS;;  Surgeon: Maxwell Israel MD;  Location: [facility]     FISTULOTOMY RECTUM  2013    Procedure: FISTULOTOMY RECTUM;;  Surgeon: Maxwell Israel MD;  Location: [facility]     HEMORRHOID SURGERY       RELEASE CARPAL TUNNEL  2001    Rt  "    Objective  Ht (P) 5' 8\" (1.727 m)  Wt (P) 271 lb (122.9 kg)  BMI (P) 41.21 kg/m2    General: healthy, alert and in no distress    HEENT: no scleral icterus or conjunctival erythema   Skin: no suspicious lesions or rash. No jaundice.   CV: regular rhythm by palpation, 2+ distal pulses, no pedal edema    Resp: normal respiratory effort without conversational dyspnea   Psych: normal mood and affect    Gait: antalgic, appropriate coordination and balance   Neuro: normal light touch sensory exam of the extremities. Motor strength as noted below     Right Ankle/Foot Exam:    Inspection:       no visible ecchymosis       no visible effusion       edema noted mild superolateral ankle and over lateral malleolus, improving    Foot inspection:       no deformity noted    ROM:        full ROM with dorsiflexion, plantarflexion, inversion and eversion       limited by pain with resisted eversion and dorsiflexion    Tender:       ATFL       Mild lateral malleolus, ATiFL    Non-Tender:       remainder of foot and ankle       deltoid ligament       posterior edge of lateral malleolus       proximal 5th metatarsal       dorsal tibiotalar joint       tarsal navicular       medial malleolus       distal tibiofibular joint       proximal 1st and 2nd intermetatarsal ligament       tibialis posterior tendon, posterior to medial malleolus       peroneal tendon sheath    Skin:       well perfused       capillary refill less than 2 seconds    Special Tests:       neg (-) anterior drawer        neg (-) talar tilt        positive (+) external rotation testing, mild + tib-fib squeeze    Gait:       antalgic gait, improving    Proprioception:        deferred      Radiology:  XR images independently visualized and reviewed with patient today in clinic  No clear acute findings, no significant degenerative changes, will follow final radiology read    Assessment:  1. Injury of right ankle, subsequent encounter        Plan:  Discussed the " assessment with the patient.  Follow up: 2-4 weeks, prn  Improving overall as expected, reported 60% improvement in first 2 weeks  Letter updated for work  PT offered, HEP provided  RICE reviewed, as well as appropriate brace usage  We discussed modified progressive pain-free activity as tolerated  Home handouts provided and supportive care reviewed  All questions were answered today  Contact us with additional questions or concerns  Signs and sx of concern reviewed      Behzad Mclaughlin DO, CADOMINGA  Primary Care Sports Medicine  [facility]

## 2018-02-07 NOTE — LETTER
February 7, 2018        Salvatore was seen in my office today for a right ankle injury that occurred at work 1/19/2018.  He has a high and lateral ankle sprain which will improve over time, likely 6-8 weeks total.  He would like to continue working.  I will continue to allow him to work as long as he can do so without significant pain and as long as he is safe.  For the next 6 weeks, lift/carry more than 20 pounds may be difficult and should be limited and modified or eliminated if painful.  I suspect ladders and climbing will also be difficult and potentially unsafe and recommend limiting that as needed as well.  I will see him again in 4 weeks as needed.  Updated recommendations will be provided as he progresses.        Behzad Trinidad, , CAQ  Primary Care Sports Medicine  [facility]

## 2018-02-07 NOTE — MR AVS SNAPSHOT
After Visit Summary   2/7/2018    Salvatore Kim    MRN: [id number]           Patient Information     Date Of Birth          1970        Visit Information        Provider Department      2/7/2018 3:20 PM Behzad Mclaughlin,  [facility]        Today's Diagnoses     Injury of right ankle, subsequent encounter    -  1       Follow-ups after your visit        Who to contact     If you have questions or need follow up information about today's clinic visit or your schedule please contact [facility] directly at [phone].  Normal or non-critical lab and imaging results will be communicated to you by QPSoftwarehart, letter or phone within 4 business days after the clinic has received the results. If you do not hear from us within 7 days, please contact the clinic through BigTipt or phone. If you have a critical or abnormal lab result, we will notify you by phone as soon as possible.  Submit refill requests through [employer] or call your pharmacy and they will forward the refill request to us. Please allow 3 business days for your refill to be completed.          Additional Information About Your Visit        MyChart Information     [employer] gives you secure access to your electronic health record. If you see a primary care provider, you can also send messages to your care team and make appointments. If you have questions, please call your primary care clinic.  If you do not have a primary care provider, please call [phone] and they will assist you.        Care EveryWhere ID     This is your Care EveryWhere ID. This could be used by other organizations to access your [facility] medical records  [id number]         Blood Pressure from Last 3 Encounters:   01/19/18 139/88   06/08/17 112/79   03/16/17 114/73    Weight from Last 3 Encounters:   02/07/18 (P) 271 lb (122.9 kg)   01/19/18 272 lb (123.4 kg)   06/08/17 245 lb 9.6 oz (111.4 kg)               Today, you had the following     No orders found for display       Primary Care Provider Office Phone # Fax #    Nghia Momin PA-C [phone] [address]  [facility] [address]        Equal Access to Services     DOMINIC CASTANEDA : Hadii aad ku haditzo Soomaali, waaxda luqadaha, qaybta kaalmada adeegyada, waxchapin idiin hayannetten adeary jackson lamaria gjosafat . So [facility] [place] [facility] [phone].    ATENCIÓN: Si habla español, tiene a lucas disposición servicios gratuitos de asistencia lingüística. Llame al [phone].    We comply with applicable federal civil rights laws and [place] laws. We do not discriminate on the basis of race, color, national origin, age, disability, sex, sexual orientation, or gender identity.            Thank you!     Thank you for choosing [facility]  for your care. Our goal is always to provide you with excellent care. Hearing back from our patients is one way we can continue to improve our services. Please take a few minutes to complete the written survey that you may receive in the mail after your visit with us. Thank you!             Your Updated Medication List - Protect others around you: Learn how to safely use, store and throw away your medicines at www.disposemymeds.org.          This list is accurate as of 2/7/18  4:50 PM.  Always use your most recent med list.                   Brand Name Dispense Instructions for use Diagnosis    EPINEPHrine 0.3 MG/0.3ML injection 2-pack    EPIPEN/ADRENACLICK/or ANY BX GENERIC EQUIV    2 each    Inject 0.3 mLs (0.3 mg) into the muscle once as needed for anaphylaxis    Hives, Allergic reaction, subsequent encounter       NICORETTE MINI MT           polyethylene glycol 0.4%- propylene glycol 0.3% 0.4-0.3 % Soln ophthalmic solution    SYSTANE ULTRA     Place 1 drop into both eyes 2 times daily    Dry eyes, bilateral       sildenafil 100 MG tablet    VIAGRA    10 tablet    Take 1 tablet (100 mg) by mouth  daily as needed for erectile dysfunction    Impotence

## 2018-04-24 ENCOUNTER — OFFICE VISIT (OUTPATIENT)
Dept: FAMILY MEDICINE | Facility: CLINIC | Age: 48
End: 2018-04-24
Payer: COMMERCIAL

## 2018-04-24 VITALS
DIASTOLIC BLOOD PRESSURE: 82 MMHG | HEART RATE: 68 BPM | BODY MASS INDEX: 42.39 KG/M2 | SYSTOLIC BLOOD PRESSURE: 133 MMHG | OXYGEN SATURATION: 96 % | WEIGHT: 278.8 LBS | TEMPERATURE: 97.6 F

## 2018-04-24 DIAGNOSIS — K21.9 GASTROESOPHAGEAL REFLUX DISEASE, ESOPHAGITIS PRESENCE NOT SPECIFIED: Primary | ICD-10-CM

## 2018-04-24 DIAGNOSIS — T63.444D: ICD-10-CM

## 2018-04-24 PROCEDURE — 99213 OFFICE O/P EST LOW 20 MIN: CPT | Performed by: FAMILY MEDICINE

## 2018-04-24 RX ORDER — EPINEPHRINE 0.3 MG/.3ML
0.3 INJECTION SUBCUTANEOUS
Qty: 2 ML | Refills: 0 | Status: SHIPPED | OUTPATIENT
Start: 2018-04-24 | End: 2019-02-05

## 2018-04-24 NOTE — PROGRESS NOTES
SUBJECTIVE:   Salvatore Kim is a 48 year old male who presents to clinic today for the following health issues:     H Pylori testing  Girlfriend has been positive for H Pylori for the past x1 year.  Girlfriend is now beginning her 2nd round of antibiotics for this.  Patient denies any abdominal/ GI symptoms but would like to tested to make sure he does not have it.    Reports a history of GERD, diet controlled. No history of peptic ulcer disease or H pylori infection. Denies having any abdominal pain, n/v, bloating or fatigue  Despite no symptoms requesting to be tested for H pylori.       Requesting for a refill on epipen- has a systemic bee sting allergy.     Problem list and histories reviewed & adjusted, as indicated.  Additional history: as documented    Patient Active Problem List   Diagnosis     Mild major depression (H)     Tobacco abuse     Presbyopia OU     Hypertriglyceridemia     Metabolic syndrome     Impotence     Allergic reaction     HNP (herniated nucleus pulposus), lumbar     Anal fissure     CARDIOVASCULAR SCREENING; LDL GOAL LESS THAN 130     Vitamin deficiency     Esophageal reflux (GERD)     BMI 40.0-44.9, adult (H)     Past Surgical History:   Procedure Laterality Date     COLONOSCOPY  6/7/2013    Procedure: COLONOSCOPY;  COLONOSCOPY, EXAM UNDER ANESTHESIA,  FISTULOTOMY;  Surgeon: Maxwell Israel MD;  Location: [facility]     EXAM UNDER ANESTHESIA ANUS  6/7/2013    Procedure: EXAM UNDER ANESTHESIA ANUS;;  Surgeon: Maxwell Israel MD;  Location: [facility]     FISTULOTOMY RECTUM  6/7/2013    Procedure: FISTULOTOMY RECTUM;;  Surgeon: Maxwell Israel MD;  Location: [facility]     HEMORRHOID SURGERY       RELEASE CARPAL TUNNEL  2001    Rt       Social History   Substance Use Topics     Smoking status: Former Smoker     Packs/day: 1.50     Years: 25.00     Types: Cigarettes     Smokeless tobacco: Never Used      Comment: quit 3 weeks ago.     Alcohol use 2.0 oz/week     4 Standard drinks or equivalent per  week      Comment: RARE, socially      Family History   Problem Relation Age of Onset     Myocardial Infarction Father 45     HEART DISEASE Father      C.A.D. Father      Myocardial Infarction Paternal Grandfather 45     Myocardial Infarction Maternal Grandfather 45     CANCER Mother      DIABETES Paternal Grandmother      DIABETES Paternal Aunt      Cancer - colorectal No family hx of      Prostate Cancer No family hx of      Hypertension No family hx of      CEREBROVASCULAR DISEASE No family hx of      Thyroid Disease No family hx of      Glaucoma No family hx of      Macular Degeneration No family hx of          Current Outpatient Prescriptions   Medication Sig Dispense Refill     EPINEPHrine (EPIPEN/ADRENACLICK/OR ANY BX GENERIC EQUIV) 0.3 MG/0.3ML injection 2-pack Inject 0.3 mLs (0.3 mg) into the muscle once as needed for anaphylaxis 2 mL 0     Nicotine Polacrilex (NICORETTE MINI MT)        polyethylene glycol 0.4%- propylene glycol 0.3% (SYSTANE ULTRA) 0.4-0.3 % SOLN ophthalmic solution Place 1 drop into both eyes 2 times daily (Patient not taking: Reported on 4/24/2018)       sildenafil (VIAGRA) 100 MG tablet Take 1 tablet (100 mg) by mouth daily as needed for erectile dysfunction (Patient not taking: Reported on 4/24/2018) 10 tablet 5     Allergies   Allergen Reactions     Bees Swelling       Reviewed and updated as needed this visit by clinical staff  Tobacco  Allergies  Meds       Reviewed and updated as needed this visit by Provider         ROS:  Constitutional, HEENT, cardiovascular, pulmonary, gi and gu systems are negative, except as otherwise noted.    OBJECTIVE:     /82  Pulse 68  Temp 97.6  F (36.4  C) (Tympanic)  Wt 278 lb 12.8 oz (126.5 kg)  SpO2 96%  BMI (P) 42.39 kg/m2  Body mass index is 42.39 kg/(m^2) (pended).  GENERAL: healthy, alert and no distress  ABDOMEN: soft, nontender, no hepatosplenomegaly, no masses and bowel sounds normal    Diagnostic Test Results:  See orders  below    ASSESSMENT/PLAN:     Salvatore was seen today for gi problem.    Diagnoses and all orders for this visit:    Gastroesophageal reflux disease, esophagitis presence not specified  Patient education and Handout from Up to date given  -     H Pylori antigen stool; Future    Systemic reaction to bee sting, undetermined intent, subsequent encounter  -     EPINEPHrine (EPIPEN/ADRENACLICK/OR ANY BX GENERIC EQUIV) 0.3 MG/0.3ML injection 2-pack; Inject 0.3 mLs (0.3 mg) into the muscle once as needed for anaphylaxis    Will notify him with results.       Follow up as needed      Chelsie Montoya MD  [facility]

## 2018-04-24 NOTE — MR AVS SNAPSHOT
After Visit Summary   4/24/2018    Salvatore Kim    MRN: [id number]           Patient Information     Date Of Birth          1970        Visit Information        Provider Department      4/24/2018 3:30 PM Chelsie Montoya MD [facility]        Today's Diagnoses     Systemic reaction to bee sting, undetermined intent, subsequent encounter    -  1    Gastroesophageal reflux disease, esophagitis presence not specified           Follow-ups after your visit        Follow-up notes from your care team     Return for As needed..      Future tests that were ordered for you today     Open Future Orders        Priority Expected Expires Ordered    H Pylori antigen stool Routine  5/24/2018 4/24/2018            Who to contact     Normal or non-critical lab and imaging results will be communicated to you by hCentivehart, letter or phone within 4 business days after the clinic has received the results. If you do not hear from us within 7 days, please contact the clinic through hCentivehart or phone. If you have a critical or abnormal lab result, we will notify you by phone as soon as possible.  Submit refill requests through Octopart or call your pharmacy and they will forward the refill request to us. Please allow 3 business days for your refill to be completed.          If you need to speak with a  for additional information , please call: [phone]             Additional Information About Your Visit        [email or web address] Information     Oct[employer]art gives you secure access to your electronic health record. If you see a primary care provider, you can also send messages to your care team and make appointments. If you have questions, please call your primary care clinic.  If you do not have a primary care provider, please call [phone] and they will assist you.        Care EveryWhere ID     This is your Care EveryWhere ID. This could be used by other organizations to access your [facility]  records  [id number]        Your Vitals Were     Pulse Temperature Pulse Oximetry BMI (Body Mass Index)          68 97.6  F (36.4  C) (Tympanic) 96% 42.39 kg/m2         Blood Pressure from Last 3 Encounters:   04/24/18 133/82   01/19/18 139/88   06/08/17 112/79    Weight from Last 3 Encounters:   04/24/18 278 lb 12.8 oz (126.5 kg)   02/07/18 (P) 271 lb (122.9 kg)   01/19/18 272 lb (123.4 kg)                 Where to get your medicines      These medications were sent to [facility] [address] [facility], [place] - [address]  [address], [facility] [address]     Phone:  [phone]     EPINEPHrine 0.3 MG/0.3ML injection 2-pack          Primary Care Provider Office Phone # Fax #    Nghia Momin PA-C [phone] [address]  [facility] [address]        Equal Access to Services     [facility]: Hadii aad ku hadasho Soomaali, waaxda luqadaha, qaybta kaalmada adeegyada, bronson moulton . So [facility] [phone].    ATENCIÓN: Si habla español, tiene a lucas disposición servicios gratuitos de asistencia lingüística. [facility] [phone].    We comply with applicable federal civil rights laws and [place] laws. We do not discriminate on the basis of race, color, national origin, age, disability, sex, sexual orientation, or gender identity.            Thank you!     Thank you for choosing [facility]  for your care. Our goal is always to provide you with excellent care. Hearing back from our patients is one way we can continue to improve our services. Please take a few minutes to complete the written survey that you may receive in the mail after your visit with us. Thank you!             Your Updated Medication List - Protect others around you: Learn how to safely use, store and throw away your medicines at www.disposemymeds.org.          This list is accurate as of 4/24/18  3:43 PM.  Always use your most recent med list.                   Brand Name  Dispense Instructions for use Diagnosis    EPINEPHrine 0.3 MG/0.3ML injection 2-pack    EPIPEN/ADRENACLICK/or ANY BX GENERIC EQUIV    2 mL    Inject 0.3 mLs (0.3 mg) into the muscle once as needed for anaphylaxis    Systemic reaction to bee sting, undetermined intent, subsequent encounter       NICORETTE MINI MT           polyethylene glycol 0.4%- propylene glycol 0.3% 0.4-0.3 % Soln ophthalmic solution    SYSTANE ULTRA     Place 1 drop into both eyes 2 times daily    Dry eyes, bilateral       sildenafil 100 MG tablet    VIAGRA    10 tablet    Take 1 tablet (100 mg) by mouth daily as needed for erectile dysfunction    Impotence

## 2018-11-29 ENCOUNTER — OFFICE VISIT (OUTPATIENT)
Dept: PODIATRY | Facility: CLINIC | Age: 48
End: 2018-11-29
Payer: COMMERCIAL

## 2018-11-29 VITALS
OXYGEN SATURATION: 96 % | BODY MASS INDEX: 41.68 KG/M2 | WEIGHT: 275 LBS | DIASTOLIC BLOOD PRESSURE: 74 MMHG | SYSTOLIC BLOOD PRESSURE: 118 MMHG | HEART RATE: 57 BPM | HEIGHT: 68 IN | RESPIRATION RATE: 13 BRPM

## 2018-11-29 DIAGNOSIS — L60.0 INGROWING NAIL: Primary | ICD-10-CM

## 2018-11-29 PROCEDURE — 11750 EXCISION NAIL&NAIL MATRIX: CPT | Mod: 59 | Performed by: PODIATRIST

## 2018-11-29 PROCEDURE — 99213 OFFICE O/P EST LOW 20 MIN: CPT | Mod: 25 | Performed by: PODIATRIST

## 2018-11-29 NOTE — LETTER
11/29/2018         RE: Salvatore Kim  [address]  [place] [address]        Dear Colleague,    Thank you for referring your patient, Salvatore Kim, to the [facility]. Please see a copy of my visit note below.    Subjective:    Pt is seen today as a new pt referral w/ the c/c of a painful ingrown left second nail medial border and left fourth nail medial border.  This has been problematic for last year intermittently and recently getting worse. negativehistory of drainage from the site.  Aggravated by activity and relieved by rest.  Has tried soaking which has not helped.   denies history of trauma to the area.  Patient is a  and is wearing steel toed boots.  Had permanent removal of both borders of both big toes and has worked very well for him in the past and he would like to have this done today.    ROS:  A 10-point review of systems was performed and is positive for that noted in the HPI and as seen above.  All other areas are negative.          Allergies   Allergen Reactions     Bees Swelling       Current Outpatient Prescriptions   Medication Sig Dispense Refill     EPINEPHrine (EPIPEN/ADRENACLICK/OR ANY BX GENERIC EQUIV) 0.3 MG/0.3ML injection 2-pack Inject 0.3 mLs (0.3 mg) into the muscle once as needed for anaphylaxis 2 mL 0     Nicotine Polacrilex (NICORETTE MINI MT)        polyethylene glycol 0.4%- propylene glycol 0.3% (SYSTANE ULTRA) 0.4-0.3 % SOLN ophthalmic solution Place 1 drop into both eyes 2 times daily       sildenafil (VIAGRA) 100 MG tablet Take 1 tablet (100 mg) by mouth daily as needed for erectile dysfunction 10 tablet 5       Patient Active Problem List   Diagnosis     Mild major depression (H)     Tobacco abuse     Presbyopia OU     Hypertriglyceridemia     Metabolic syndrome     Impotence     Allergic reaction     HNP (herniated nucleus pulposus), lumbar     Anal fissure     CARDIOVASCULAR SCREENING; LDL GOAL LESS THAN 130     Vitamin deficiency      "Esophageal reflux (GERD)     BMI 40.0-44.9, adult (H)       Past Medical History:   Diagnosis Date     Allergies      Arthritis      Depression, anxiety      ED (erectile dysfunction)      Hyperlipidemia      Metabolic syndrome        Past Surgical History:   Procedure Laterality Date     COLONOSCOPY  6/7/2013    Procedure: COLONOSCOPY;  COLONOSCOPY, EXAM UNDER ANESTHESIA,  FISTULOTOMY;  Surgeon: Maxwell Israel MD;  Location: [facility]     EXAM UNDER ANESTHESIA ANUS  6/7/2013    Procedure: EXAM UNDER ANESTHESIA ANUS;;  Surgeon: Maxwell Israel MD;  Location: [facility]     FISTULOTOMY RECTUM  6/7/2013    Procedure: FISTULOTOMY RECTUM;;  Surgeon: Maxwell Israel MD;  Location: [facility]     HEMORRHOID SURGERY       RELEASE CARPAL TUNNEL  2001    Rt       Family History   Problem Relation Age of Onset     Myocardial Infarction Father 45     Heart Disease Father      C.A.D. Father      Myocardial Infarction Paternal Grandfather 45     Myocardial Infarction Maternal Grandfather 45     Cancer Mother      Diabetes Paternal Grandmother      Diabetes Paternal Aunt      Cancer - colorectal No family hx of      Prostate Cancer No family hx of      Hypertension No family hx of      Cerebrovascular Disease No family hx of      Thyroid Disease No family hx of      Glaucoma No family hx of      Macular Degeneration No family hx of        Social History   Substance Use Topics     Smoking status: Former Smoker     Packs/day: 1.50     Years: 25.00     Types: Cigarettes     Smokeless tobacco: Never Used      Comment: quit 3 weeks ago.     Alcohol use 2.0 oz/week     4 Standard drinks or equivalent per week      Comment: RARE, socially          Exam:    Vitals: /74 (BP Location: Left arm, Patient Position: Chair, Cuff Size: Adult Large)  Pulse 57  Resp 13  Ht 5' 8\" (1.727 m)  Wt 275 lb (124.7 kg)  SpO2 96%  BMI 41.81 kg/m2  BMI: Body mass index is 41.81 kg/(m^2).  Height: 5' 8\"    Constitutional/ general:  Pt is in no apparent " distress, appears well-nourished.  Cooperative with history and physical exam.     Psych:  The patient answered questions appropriately.  Normal affect.  Seems to have reasonable expectations, in terms of treatment.     Eyes:  Visual scanning/ tracking without deficit.     Ears:  Response to auditory stimuli is normal.  negative hearing aid devices.  Auricles in proper alignment.     Lymphatic:  Popliteal lymph nodes not enlarged.     Lungs:  Non labored breathing, non labored speech. No cough.  No audible wheezing. Even, quiet breathing.       Vascular:  positive pedal pulses bilaterally for both the DP and PT arteries.  CFT < 3 sec.  negative ankle edema.  positive pedal hair growth.    Neuro:  Alert and oriented x 3. Coordinated gait.  Light touch sensation is intact to the L4, L5, S1 distributions. No obvious deficits.  No evidence of neurological-based weakness, spasticity, or contracture in the lower extremities.     Derm: Normal texture and turgor.  No erythema, ecchymosis, or cyanosis.      Musculoskeletal:    Lower extremity muscle strength is normal.  Patient is ambulatory without an assistive device or brace.  Normal arch with weightbearing.  No forefoot or rear foot deformities noted.   Normal ROM all fore foot and rearfoot joints.  No equinus.  left second toe nail medial border and left fourth toe medial border shows soft tissue impingement with localized erythema.   negative active drainage/purulence at this time.  No sinus tracts.  No nailbed masses or exostosis.  No pain with range of motion of IPJ or MTPJ.  No ascending cellulitis.    ASSESSMENT:    Onychocryptosis with paronychia left second medial border.  Onychocryptosis with paronychia left fourth toe medial border    Discussed etiology and treatment options in detail w/ the pt.  The potential causes and nature of an ingrown toenail were discussed with the patient.  We reviewed the natural history/prognosis of the condition and potential risks  if no treatment is provided.      Treatment options discussed included conservative management (oral antibiotics, soaking of foot, adequate width shoes)  as well as surgical management (partial or total nail removal).  The pros and cons of both forms of treatment were reviewed.  Handout given to patient.      After thorough discussion and answering all questions, the patient elected to have permanent removal of affected nail border.  Risk complications and efficacy discussed with patient.  Obtained consent, used 3cc of 1% lidocaine plain to block left second and left fourth toe.  Sterile prep, then avulsed the medial border of both left second and left fourth toe.  No evidence of deep abscess noted.  Phenol was applied times 3 at 3o second intervals with curettage in between and then alcohol rinse.  Pt tolerated procedure well.  Sterile bandage placed, gave wound care instruction.  Return to clinic prn    Viral Farrell DPM, FACFAS      Again, thank you for allowing me to participate in the care of your patient.        Sincerely,        Viral Farrell DPM

## 2018-11-29 NOTE — PATIENT INSTRUCTIONS
Weight management plan: Patient was referred to their PCP to discuss a diet and exercise plan.     We wish you continued good healing. If you have any questions or concerns, please do not hesitate to contact us at [phone]    Please remember to call and schedule a follow up appointment if one was recommended at your earliest convenience.   PODIATRY CLINIC HOURS  TELEPHONE NUMBER    Dr. Viral Farrell D.P.M [facility]    Clinics:  [facility]    [place]   Tuesday 1PM-6PM  [place]  Wednesday 7AM-2PM  [facility]  Thursday 10AM-6PM  [place]  Friday 7AM-3PM  [place]  Specialty schedulers:   [phone] to make an appointment with any Specialty Provider.        Urgent Care locations:    [facility] Monday-Friday 5 pm - 9 pm. Saturday-Sunday 9 am -5pm    Monday-Friday 11 am - 9 pm Saturday 9 am - 5 pm     Monday-Sunday 12 noon-8PM [phone]     If you need a medication refill, please contact us you may need lab work and/or a follow up visit prior to your refill (i.e. Antifungal medications).    [employer] (secure e-mail communication and access to your chart) to send a message or to make an appointment.    If MRI needed please call [place] Hand at [phone]        Weight management plan: Patient was referred to their PCP to discuss a diet and exercise plan.

## 2018-11-29 NOTE — PROGRESS NOTES
Subjective:    Pt is seen today as a new pt referral w/ the c/c of a painful ingrown left second nail medial border and left fourth nail medial border.  This has been problematic for last year intermittently and recently getting worse. negativehistory of drainage from the site.  Aggravated by activity and relieved by rest.  Has tried soaking which has not helped.   denies history of trauma to the area.  Patient is a  and is wearing steel toed boots.  Had permanent removal of both borders of both big toes and has worked very well for him in the past and he would like to have this done today.    ROS:  A 10-point review of systems was performed and is positive for that noted in the HPI and as seen above.  All other areas are negative.          Allergies   Allergen Reactions     Bees Swelling       Current Outpatient Prescriptions   Medication Sig Dispense Refill     EPINEPHrine (EPIPEN/ADRENACLICK/OR ANY BX GENERIC EQUIV) 0.3 MG/0.3ML injection 2-pack Inject 0.3 mLs (0.3 mg) into the muscle once as needed for anaphylaxis 2 mL 0     Nicotine Polacrilex (NICORETTE MINI MT)        polyethylene glycol 0.4%- propylene glycol 0.3% (SYSTANE ULTRA) 0.4-0.3 % SOLN ophthalmic solution Place 1 drop into both eyes 2 times daily       sildenafil (VIAGRA) 100 MG tablet Take 1 tablet (100 mg) by mouth daily as needed for erectile dysfunction 10 tablet 5       Patient Active Problem List   Diagnosis     Mild major depression (H)     Tobacco abuse     Presbyopia OU     Hypertriglyceridemia     Metabolic syndrome     Impotence     Allergic reaction     HNP (herniated nucleus pulposus), lumbar     Anal fissure     CARDIOVASCULAR SCREENING; LDL GOAL LESS THAN 130     Vitamin deficiency     Esophageal reflux (GERD)     BMI 40.0-44.9, adult (H)       Past Medical History:   Diagnosis Date     Allergies      Arthritis      Depression, anxiety      ED (erectile dysfunction)      Hyperlipidemia      Metabolic syndrome        Past  "Surgical History:   Procedure Laterality Date     COLONOSCOPY  6/7/2013    Procedure: COLONOSCOPY;  COLONOSCOPY, EXAM UNDER ANESTHESIA,  FISTULOTOMY;  Surgeon: Maxwell Israel MD;  Location: [facility]     EXAM UNDER ANESTHESIA ANUS  6/7/2013    Procedure: EXAM UNDER ANESTHESIA ANUS;;  Surgeon: Maxwell Israel MD;  Location: [facility]     FISTULOTOMY RECTUM  6/7/2013    Procedure: FISTULOTOMY RECTUM;;  Surgeon: Maxwell Israel MD;  Location: [facility]     HEMORRHOID SURGERY       RELEASE CARPAL TUNNEL  2001    Rt       Family History   Problem Relation Age of Onset     Myocardial Infarction Father 45     Heart Disease Father      C.A.D. Father      Myocardial Infarction Paternal Grandfather 45     Myocardial Infarction Maternal Grandfather 45     Cancer Mother      Diabetes Paternal Grandmother      Diabetes Paternal Aunt      Cancer - colorectal No family hx of      Prostate Cancer No family hx of      Hypertension No family hx of      Cerebrovascular Disease No family hx of      Thyroid Disease No family hx of      Glaucoma No family hx of      Macular Degeneration No family hx of        Social History   Substance Use Topics     Smoking status: Former Smoker     Packs/day: 1.50     Years: 25.00     Types: Cigarettes     Smokeless tobacco: Never Used      Comment: quit 3 weeks ago.     Alcohol use 2.0 oz/week     4 Standard drinks or equivalent per week      Comment: RARE, socially          Exam:    Vitals: /74 (BP Location: Left arm, Patient Position: Chair, Cuff Size: Adult Large)  Pulse 57  Resp 13  Ht 5' 8\" (1.727 m)  Wt 275 lb (124.7 kg)  SpO2 96%  BMI 41.81 kg/m2  BMI: Body mass index is 41.81 kg/(m^2).  Height: 5' 8\"    Constitutional/ general:  Pt is in no apparent distress, appears well-nourished.  Cooperative with history and physical exam.     Psych:  The patient answered questions appropriately.  Normal affect.  Seems to have reasonable expectations, in terms of treatment.     Eyes:  Visual scanning/ " tracking without deficit.     Ears:  Response to auditory stimuli is normal.  negative hearing aid devices.  Auricles in proper alignment.     Lymphatic:  Popliteal lymph nodes not enlarged.     Lungs:  Non labored breathing, non labored speech. No cough.  No audible wheezing. Even, quiet breathing.       Vascular:  positive pedal pulses bilaterally for both the DP and PT arteries.  CFT < 3 sec.  negative ankle edema.  positive pedal hair growth.    Neuro:  Alert and oriented x 3. Coordinated gait.  Light touch sensation is intact to the L4, L5, S1 distributions. No obvious deficits.  No evidence of neurological-based weakness, spasticity, or contracture in the lower extremities.     Derm: Normal texture and turgor.  No erythema, ecchymosis, or cyanosis.      Musculoskeletal:    Lower extremity muscle strength is normal.  Patient is ambulatory without an assistive device or brace.  Normal arch with weightbearing.  No forefoot or rear foot deformities noted.   Normal ROM all fore foot and rearfoot joints.  No equinus.  left second toe nail medial border and left fourth toe medial border shows soft tissue impingement with localized erythema.   negative active drainage/purulence at this time.  No sinus tracts.  No nailbed masses or exostosis.  No pain with range of motion of IPJ or MTPJ.  No ascending cellulitis.    ASSESSMENT:    Onychocryptosis with paronychia left second medial border.  Onychocryptosis with paronychia left fourth toe medial border    Discussed etiology and treatment options in detail w/ the pt.  The potential causes and nature of an ingrown toenail were discussed with the patient.  We reviewed the natural history/prognosis of the condition and potential risks if no treatment is provided.      Treatment options discussed included conservative management (oral antibiotics, soaking of foot, adequate width shoes)  as well as surgical management (partial or total nail removal).  The pros and cons of both  forms of treatment were reviewed.  Handout given to patient.      After thorough discussion and answering all questions, the patient elected to have permanent removal of affected nail border.  Risk complications and efficacy discussed with patient.  Obtained consent, used 3cc of 1% lidocaine plain to block left second and left fourth toe.  Sterile prep, then avulsed the medial border of both left second and left fourth toe.  No evidence of deep abscess noted.  Phenol was applied times 3 at 3o second intervals with curettage in between and then alcohol rinse.  Pt tolerated procedure well.  Sterile bandage placed, gave wound care instruction.  Return to clinic prn    Viral Farrell DPM, FACFAS

## 2018-11-29 NOTE — MR AVS SNAPSHOT
After Visit Summary   11/29/2018    Salvatore Kim    MRN: [id number]           Patient Information     Date Of Birth          1970        Visit Information        Provider Department      11/29/2018 4:00 PM Viral Farrell, DPM [facility]        Today's Diagnoses     Ingrowing nail    -  1      Care Instructions    Weight management plan: Patient was referred to their PCP to discuss a diet and exercise plan.     We wish you continued good healing. If you have any questions or concerns, please do not hesitate to contact us at [phone]    Please remember to call and schedule a follow up appointment if one was recommended at your earliest convenience.   PODIATRY CLINIC HOURS  TELEPHONE NUMBER    Dr. Viral SHIPMANPCORWIN FAC FAS    Clinics:  [facility]    [place] Due[facility]as [facility]   Tuesday 1PM-6PM  [place]  Wednesday 7AM-2PM  [place]  Thursday 10AM-6PM  [place]  Friday 7AM-3PM  [place]  Specialty schedulers:   [phone] to make an appointment with any Specialty Provider.        Urgent Care locations:    [facility] Monday-Friday 5 pm - 9 pm. Saturday-Sunday 9 am -5pm    Monday-Friday 11 am - 9 pm Saturday 9 am - 5 pm     Monday-Sunday 12 noon-8PM [phone]     If you need a medication refill, please contact us you may need lab work and/or a follow up visit prior to your refill (i.e. Antifungal medications).    [employer] (secure e-mail communication and access to your chart) to send a message or to make an appointment.    If MRI needed please call [place] Hand at [phone]        Weight management plan: Patient was referred to their PCP to discuss a diet and exercise plan.            Follow-ups after your visit        Who to contact     If you have questions or need follow up information about today's clinic visit or your  "schedule please contact [facility] directly at [phone].  Normal or non-critical lab and imaging results will be communicated to you by MyChart, letter or phone within 4 business days after the clinic has received the results. If you do not hear from us within 7 days, please contact the clinic through [employer] or phone. If you have a critical or abnormal lab result, we will notify you by phone as soon as possible.  Submit refill requests through [facility] or call your pharmacy and they will forward the refill request to us. Please allow 3 business days for your refill to be completed.          Additional Information About Your Visit        [employer] Information     [facility] gives you secure access to your electronic health record. If you see a primary care provider, you can also send messages to your care team and make appointments. If you have questions, please call your primary care clinic.  If you do not have a primary care provider, please call [phone] and they will assist you.        Care EveryWhere ID     This is your Care EveryWhere ID. This could be used by other organizations to access your [place] medical records  [id number]        Your Vitals Were     Pulse Respirations Height Pulse Oximetry BMI (Body Mass Index)       57 13 5' 8\" (1.727 m) 96% 41.81 kg/m2        Blood Pressure from Last 3 Encounters:   11/29/18 118/74   04/24/18 133/82   01/19/18 139/88    Weight from Last 3 Encounters:   11/29/18 275 lb (124.7 kg)   04/24/18 278 lb 12.8 oz (126.5 kg)   02/07/18 (P) 271 lb (122.9 kg)              We Performed the Following     REMOVAL NAIL/NAIL BED, PARTIAL OR COMPLETE     REMOVAL NAIL/NAIL BED, PARTIAL OR COMPLETE        Primary Care Provider Office Phone # Fax #    Nghia Momin PA-C [phone] [address]  [facility] [address]        Equal Access to Services     DOMINIC CASTANEDA AH: Hadii katherine Gonzales, waaxda luqadaha, qaybta houston mcfarland, " bronson pedroary grover'aan ah. So [facility] [phone].    ATENCIÓN: Si misty daugherty, tiene a lucas disposición servicios gratuitos de asistencia lingüística. Nathaniel horta [phone].    We comply with applicable federal civil rights laws and [place] laws. We do not discriminate on the basis of race, color, national origin, age, disability, sex, sexual orientation, or gender identity.            Thank you!     Thank you for choosing [facility]  for your care. Our goal is always to provide you with excellent care. Hearing back from our patients is one way we can continue to improve our services. Please take a few minutes to complete the written survey that you may receive in the mail after your visit with us. Thank you!             Your Updated Medication List - Protect others around you: Learn how to safely use, store and throw away your medicines at www.disposemymeds.org.          This list is accurate as of 11/29/18  5:28 PM.  Always use your most recent med list.                   Brand Name Dispense Instructions for use Diagnosis    EPINEPHrine 0.3 MG/0.3ML injection 2-pack    EPIPEN/ADRENACLICK/or ANY BX GENERIC EQUIV    2 mL    Inject 0.3 mLs (0.3 mg) into the muscle once as needed for anaphylaxis    Systemic reaction to bee sting, undetermined intent, subsequent encounter       NICORETTE MINI MT           polyethylene glycol 0.4%- propylene glycol 0.3% 0.4-0.3 % Soln ophthalmic solution    SYSTANE ULTRA     Place 1 drop into both eyes 2 times daily    Dry eyes, bilateral       sildenafil 100 MG tablet    VIAGRA    10 tablet    Take 1 tablet (100 mg) by mouth daily as needed for erectile dysfunction    Impotence

## 2019-01-21 ENCOUNTER — OFFICE VISIT (OUTPATIENT)
Dept: FAMILY MEDICINE | Facility: CLINIC | Age: 49
End: 2019-01-21
Payer: COMMERCIAL

## 2019-01-21 VITALS
TEMPERATURE: 97.1 F | BODY MASS INDEX: 44.34 KG/M2 | OXYGEN SATURATION: 98 % | DIASTOLIC BLOOD PRESSURE: 78 MMHG | HEART RATE: 67 BPM | SYSTOLIC BLOOD PRESSURE: 124 MMHG | WEIGHT: 291.6 LBS | RESPIRATION RATE: 18 BRPM

## 2019-01-21 DIAGNOSIS — Z71.84 TRAVEL ADVICE ENCOUNTER: ICD-10-CM

## 2019-01-21 DIAGNOSIS — N52.9 ERECTILE DYSFUNCTION, UNSPECIFIED ERECTILE DYSFUNCTION TYPE: ICD-10-CM

## 2019-01-21 DIAGNOSIS — Z87.898 HISTORY OF PREDIABETES: ICD-10-CM

## 2019-01-21 DIAGNOSIS — G89.29 CHRONIC MIDLINE LOW BACK PAIN WITHOUT SCIATICA: ICD-10-CM

## 2019-01-21 DIAGNOSIS — H00.011 HORDEOLUM EXTERNUM OF RIGHT UPPER EYELID: Primary | ICD-10-CM

## 2019-01-21 DIAGNOSIS — E78.5 HYPERLIPIDEMIA LDL GOAL <100: ICD-10-CM

## 2019-01-21 DIAGNOSIS — M54.50 CHRONIC MIDLINE LOW BACK PAIN WITHOUT SCIATICA: ICD-10-CM

## 2019-01-21 DIAGNOSIS — E66.01 MORBID OBESITY (H): ICD-10-CM

## 2019-01-21 LAB — HBA1C MFR BLD: 5.5 % (ref 0–5.6)

## 2019-01-21 PROCEDURE — 36415 COLL VENOUS BLD VENIPUNCTURE: CPT | Performed by: FAMILY MEDICINE

## 2019-01-21 PROCEDURE — 80061 LIPID PANEL: CPT | Performed by: FAMILY MEDICINE

## 2019-01-21 PROCEDURE — 83036 HEMOGLOBIN GLYCOSYLATED A1C: CPT | Performed by: FAMILY MEDICINE

## 2019-01-21 PROCEDURE — 99214 OFFICE O/P EST MOD 30 MIN: CPT | Performed by: FAMILY MEDICINE

## 2019-01-21 RX ORDER — PHENTERMINE HYDROCHLORIDE 15 MG/1
15 CAPSULE ORAL EVERY MORNING
Qty: 30 CAPSULE | Refills: 0 | Status: SHIPPED | OUTPATIENT
Start: 2019-01-21 | End: 2019-02-25

## 2019-01-21 RX ORDER — SULFAMETHOXAZOLE/TRIMETHOPRIM 800-160 MG
1 TABLET ORAL 2 TIMES DAILY
Qty: 14 TABLET | Refills: 0 | Status: CANCELLED | OUTPATIENT
Start: 2019-01-21 | End: 2019-01-28

## 2019-01-21 RX ORDER — NABUMETONE 500 MG/1
500 TABLET, FILM COATED ORAL 2 TIMES DAILY
Qty: 60 TABLET | Refills: 1 | Status: SHIPPED | OUTPATIENT
Start: 2019-01-21 | End: 2019-02-25

## 2019-01-21 RX ORDER — SILDENAFIL 100 MG/1
100 TABLET, FILM COATED ORAL DAILY PRN
Qty: 10 TABLET | Refills: 3 | Status: SHIPPED | OUTPATIENT
Start: 2019-01-21 | End: 2024-07-08

## 2019-01-21 RX ORDER — HYDROCODONE BITARTRATE AND ACETAMINOPHEN 5; 325 MG/1; MG/1
1 TABLET ORAL 2 TIMES DAILY PRN
Qty: 20 TABLET | Refills: 0 | Status: SHIPPED | OUTPATIENT
Start: 2019-01-21 | End: 2019-02-25

## 2019-01-21 NOTE — LETTER
[address] [place] [address]    January 24, 2019    Salvatore Kim  [address]  [place] [address]          Dear Salvatore,  Your cholesterol panel doesn't look too bad, your good cholesterol (HDL) is low.  To increase this, get more cardiovascular exercise, increase your omega-3 and fiber intake.  Your screen for diabetes is negative.  Your ASCVD risk score is around 3%, which is good, so you don't meet criteria for a cholesterol medication.  If you were still smoking your risk would be 7.7%.   Enclosed is a copy of your results.     Results for orders placed or performed in visit on 01/21/19   Hemoglobin A1c   Result Value Ref Range    Hemoglobin A1C 5.5 0 - 5.6 %   Lipid panel reflex to direct LDL Non-fasting   Result Value Ref Range    Cholesterol 153 <200 mg/dL    Triglycerides 240 (H) <150 mg/dL    HDL Cholesterol 31 (L) >39 mg/dL    LDL Cholesterol Calculated 74 <100 mg/dL    Non HDL Cholesterol 122 <130 mg/dL       If you have any questions or concerns, please call myself or my nurse at [phone].      Sincerely,        Nathaniel Saldaña MD/pb

## 2019-01-21 NOTE — PROGRESS NOTES
SUBJECTIVE:   Salvatore Kim is a 48 year old male who presents to clinic today for the following health issues:    Eye(s) Problem  Onset: 3 dayss    Description:   Location: right  Pain: YES  Redness: YES    Accompanying Signs & Symptoms:  Discharge/mattering: YES  Swelling: YES  Visual changes: no   Fever: no  Nasal Congestion: no  Bothered by bright lights: no    History:   Trauma: no   Foreign body exposure: YES- possible    Precipitating factors:   Wearing contacts: no    Alleviating factors:  Improved by:     Therapies Tried and outcome: None    Eye swelling - Started Friday, right eye, doing warm wet compresses which helps, no fever, no change in vision.  He states this issue has been recurrent over the past few years, he has not seen an eye doctor for this in the past.  He states that he feels like mass is draining fluid, nonpurulent, non bloody    Obesity - recent weight gain, sedentary lifestyle, unhealthy diet, requesting counseling.  Patient reported history of prediabetes as well and is concerned about this.  He also has a strong family history of diabetes and cardiovascular disease.    Erectile dysfunction - has been going on for a few years now and has recently worsened, has taken viagra in the past which was effective, no specific life stressors at this time, no testicular changes, no morning erections, has trouble maintaining erections during intercourse.    Travel - Going to [place] in about 3 weeks - would like to know if he needs travel vaccines.  [place], is an adventurous eater    Chronic lower back pain 2/2 disc herniation, he's been doing exercises at home, medication request is in the event that he needs pain control while abroad.    Problem list and histories reviewed & adjusted, as indicated.  Additional history: as documented    BP Readings from Last 3 Encounters:   01/21/19 124/78   11/29/18 118/74   04/24/18 133/82    Wt Readings from Last 3 Encounters:    01/21/19 132.3 kg (291 lb 9.6 oz)   11/29/18 124.7 kg (275 lb)   04/24/18 126.5 kg (278 lb 12.8 oz)        Reviewed and updated as needed this visit by clinical staff  Tobacco  Allergies  Meds  Problems  Med Hx  Surg Hx  Fam Hx       Reviewed and updated as needed this visit by Provider  Tobacco  Allergies  Meds  Problems  Med Hx  Surg Hx  Fam Hx         ROS:  Review Of Systems  Skin: negative  Eyes: positive for: eye pain, redness, tearing  Ears/Nose/Throat: negative for, hearing loss, tinnitus, sinus trouble, persistent sore throat, hoarseness  Respiratory: No shortness of breath, dyspnea on exertion, cough, or hemoptysis  Cardiovascular: negative for, palpitations, chest pain, dyspnea on exertion and lower extremity edema  Gastrointestinal: negative for, nausea, vomiting, heartburn and abdominal pain  Genitourinary: negative for, nocturia, dysuria, frequency, urgency and sexually transmitted disease, positive for erectile dysfunction  Musculoskeletal: positive for: back pain and joint pain  Neurologic: negative for, headaches, stroke and numbness or tingling of feet  Psychiatric: negative  Hematologic/Lymphatic/Immunologic: negative  Endocrine: positive for negative, night sweats, polyphagia and polydipsia    OBJECTIVE:     /78   Pulse 67   Temp 97.1  F (36.2  C) (Oral)   Resp 18   Wt 132.3 kg (291 lb 9.6 oz)   SpO2 98%   BMI 44.34 kg/m    Body mass index is 44.34 kg/m .  GENERAL: healthy, alert and no distress  EYES: red, slightly swollen upper eyelid with stye under eyelid, mild conjunctival injection, PERRL and conjunctivae and sclerae normal  HENT: ear canals and TM's normal, nose and mouth without ulcers or lesions  NECK: no adenopathy, no asymmetry, masses, or scars and thyroid normal to palpation  RESP: lungs clear to auscultation - no rales, rhonchi or wheezes  CV: regular rate and rhythm, normal S1 S2, no S3 or S4, no murmur, click or rub, no peripheral edema and peripheral  pulses strong  ABDOMEN: soft, nontender, no hepatosplenomegaly, no masses and bowel sounds normal  MS: bilateral lumbar tenderness, negative straight leg, negative kirstin, no gross musculoskeletal defects noted, no edema  SKIN: no suspicious lesions or rashes  NEURO: Normal strength and tone, mentation intact and speech normal  PSYCH: mentation appears normal, affect normal/bright    ASSESSMENT/PLAN:     1. Hordeolum externum of right upper eyelid  Continue compresses, reassurance as it's improving, will refer to optho to discuss recurrence of issue  - OPHTHALMOLOGY ADULT REFERRAL    2. Erectile dysfunction, unspecified erectile dysfunction type  Discussed likely causes, particularly cardiovascular compromise, weight gain, cholesterol, sedentary lifestyle, metabolic syndrome  - sildenafil (VIAGRA) 100 MG tablet; Take 1 tablet (100 mg) by mouth daily as needed (ED)  Dispense: 10 tablet; Refill: 3  - Hemoglobin A1c  - Lipid panel reflex to direct LDL Non-fasting    3. Chronic midline low back pain without sciatica  Will give short course of pain medication for his trip with no plan to continue refills, recommend physical therapy in the future, home exercises given in the meantime  - HYDROcodone-acetaminophen (NORCO) 5-325 MG tablet; Take 1 tablet by mouth 2 times daily as needed for severe pain  Dispense: 20 tablet; Refill: 0  - nabumetone (RELAFEN) 500 MG tablet; Take 1 tablet (500 mg) by mouth 2 times daily  Dispense: 60 tablet; Refill: 1    4. Travel advice encounter  [place] [facility], which may require additional vaccinations such as typhoid, yellow fever, malaria, will refer to travel clinic in [facility].  - TRAVEL CLINIC REFERRAL    5. Morbid obesity (H)  Discussed healthy weight loss, diet and exercise goals, optimal weight, portion sizes, chewing each bite at least 20 times, will give trial of phentermine, patient will follow-up in 1 month  - phentermine (ADIPEX-P) 15 MG capsule; Take 1 capsule (15  mg) by mouth every morning  Dispense: 30 capsule; Refill: 0    6. Hyperlipidemia LDL goal <100  Will check lipid panel  - Hemoglobin A1c  - Lipid panel reflex to direct LDL Non-fasting    7. History of prediabetes  Will update diabetes status  - Hemoglobin A1c  - Lipid panel reflex to direct LDL Non-fasting    Follow-up in 1 month for weight loss    Nathaniel Hernandez MD  [facility]

## 2019-01-21 NOTE — PROGRESS NOTES
"  SUBJECTIVE:   Salvatore Kim is a 48 year old male who presents to clinic today for the following health issues:      Eye(s) Problem  Onset: 3 dayss    Description:   Location: right  Pain: YES  Redness: YES    Accompanying Signs & Symptoms:  Discharge/mattering: YES  Swelling: YES  Visual changes: no   Fever: no  Nasal Congestion: no  Bothered by bright lights: no    History:   Trauma: no   Foreign body exposure: YES- possible    Precipitating factors:   Wearing contacts: no    Alleviating factors:  Improved by:     Therapies Tried and outcome: None    {additional problems for provider to add:532363}    Problem list and histories reviewed & adjusted, as indicated.  Additional history: {NONE - AS DOCUMENTED:781306::\"as documented\"}    {HIST REVIEW/ LINKS 2:789996}    Reviewed and updated as needed this visit by clinical staff       Reviewed and updated as needed this visit by Provider         {PROVIDER CHARTING PREFERENCE:082833}    "

## 2019-01-21 NOTE — PATIENT INSTRUCTIONS
Patient Education   Traveling by Airplane with Your Medicines  Getting ready  There is no limit on the amount of medicine you may bring onto the plane. Bring enough to last the trip and 3 to 4 extra days.    Get refills of your medicines before you leave, if you need to.    Get a note from your doctor that describes your medical needs. This might be the amount of medicine or type of device, such as a syringe.    Show the doctor's note to the TSA (Transportation Security Administration). It may speed up the security check-in.    Make sure the name on your prescription matches the names on your ID and boarding pass. This could prevent a delay at security.    Plan to keep your medicines with you at all times.  Packing medicines in your carry-on bag  Most airlines will allow you an extra carry-on bag just for medicines. Do not check medicines onto the plane.     Your carry-on bag should hold:  ? All of the prescription and over-the-counter medicines you're taking (pills, inhalers, liquids, gels and aerosols). This includes petroleum jelly, eye drops and saline solution.  ? Doctors' notes  ? List of the medicines you are taking  ? List of your drug allergies.    Keep your medicines in the original containers. They should have full prescription labels.    Place bottles of liquids or gels in a one-quart, clear plastic bag. Your doctor's note should mention bottles larger than 3 ounces.    If possible, carry your medicines in a backpack.    You might pack the medicines in a carry-on bag with your computer or other items that will be screened. You will save time if you have to open just one bag.  Visual check instead of X-ray  If you want TSA officers to look through your medicines (instead of X-ray):    Have your medicines and supplies in a separate carry-on bag.    Before the screening begins, ask the TSA officer to check your medicine.    Say that the bag holds medicines that you do not want X-rayed.    You will be  asked to show your medicines and supplies. You will need to repack them afterward.    If medicines or supplies can't be cleared by a visual check, they will be X-rayed. If you refuse the X-ray, you may not carry your medicines on the plane.  Diabetes supplies    Tell the [facility] officer that you have your diabetes supplies with you.    If you do not want to walk through the metal detector with your insulin pump:  ? Tell the officer you are wearing an insulin pump.  ? Say you would like a full-body pat down and a visual check of your pump instead.  ? Tell the officer that you cannot remove the pump because it is inserted under the skin.    You may also ask for a visual check of your diabetes supplies.    These supplies must be screened:  ? Insulin (must be labeled) and loaded dispensers (vials, jet injectors, biojectors, epipens, infusers and preloaded syringes)  ? All unused syringes. There is no limit on the number of syringes when carried with insulin or medicine for injection.  ? Lancets, blood glucose meters, test strips, alcohol swabs, meter-testing solutions  ? Insulin pump and pump supplies (cleaning agents, batteries, plastic tubing, infusion kit, catheter, and needle)  ? Glucagon emergency kit  ? Urine ketone test strips  ? Sharps disposal or hard containers with or without used syringes  [facility]:     Offers special packaging (less than a 30-day supply) for trips.    Can ship partial refills to any state in the [place] We will ship the rest of your refill when you get home.    Will work with your insurance to get vacation overrides when needed. A vacation override means the insurance will cover one extra refill beyond the usual amount, or one    Can give you an extra month of medicine with a vacation override if you will be out of the country.  Summary  [facility] rule 1: Never check your medicines onto the plane. Always carry them on with you.  [facility] rule 2:  Bring an extra 3 to 4 days of  medicine.  For more information, visit:   http://blog.[employer].gov/2014/09/tsa-travel-tips-traveling-with.htm  https://apps.[employer].gov/mytsa/cib_results.aspx?search=medication  For informational purposes only. Not to replace the advice of your health care provider. Adapted from [employer] website (August 2015), www.[employer]/travel/special-procedures. Copyright   2012 [facility]. All rights reserved. [employer]com.hk 549247 - Rev 09/15.     [facility]    If you have any questions regarding to your visit please contact your care team:       Team Purple:   Clinic Hours Telephone Number   Dr. Ally Hernandez   7am-7pm  Monday - Thursday   7am-5pm  Fridays  [phone]  (Appointment scheduling available 24/7)   Urgent Care - [place] and [facility] - 11am-9pm Monday-Friday Saturday-Sunday- 9am-5pm   [place] - 5pm-9pm Monday-Friday Saturday-Sunday- 9am-5pm  [phone] - [place]  [phone] - [place]       What options do I have for a visit other than an office visit? We offer electronic visits (e-visits) and telephone visits, when medically appropriate.  Please check with your medical insurance to see if these types of visits are covered, as you will be responsible for any charges that are not paid by your insurance.      You can use inContact (secure electronic communication) to access to your chart, send your primary care provider a message, or make an appointment. Ask a team member how to get started.     For a price quote for your services, please call our Consumer Price Line at [phone] or our Imaging Cost estimation line at [phone] (for imaging tests).

## 2019-01-22 LAB
CHOLEST SERPL-MCNC: 153 MG/DL
HDLC SERPL-MCNC: 31 MG/DL
LDLC SERPL CALC-MCNC: 74 MG/DL
NONHDLC SERPL-MCNC: 122 MG/DL
TRIGL SERPL-MCNC: 240 MG/DL

## 2019-02-05 ENCOUNTER — OFFICE VISIT (OUTPATIENT)
Dept: FAMILY MEDICINE | Facility: CLINIC | Age: 49
End: 2019-02-05
Payer: COMMERCIAL

## 2019-02-05 VITALS — TEMPERATURE: 98.4 F | DIASTOLIC BLOOD PRESSURE: 84 MMHG | SYSTOLIC BLOOD PRESSURE: 127 MMHG

## 2019-02-05 DIAGNOSIS — T63.444D: ICD-10-CM

## 2019-02-05 DIAGNOSIS — Z23 VACCINE FOR SINGLE BACTERIAL DISEASE: ICD-10-CM

## 2019-02-05 DIAGNOSIS — Z71.84 COUNSELING ABOUT TRAVEL: Primary | ICD-10-CM

## 2019-02-05 PROCEDURE — 90691 TYPHOID VACCINE IM: CPT | Mod: GA | Performed by: FAMILY MEDICINE

## 2019-02-05 PROCEDURE — 90472 IMMUNIZATION ADMIN EACH ADD: CPT | Mod: GA | Performed by: FAMILY MEDICINE

## 2019-02-05 PROCEDURE — 90471 IMMUNIZATION ADMIN: CPT | Mod: GA | Performed by: FAMILY MEDICINE

## 2019-02-05 PROCEDURE — 99402 PREV MED CNSL INDIV APPRX 30: CPT | Mod: 25 | Performed by: FAMILY MEDICINE

## 2019-02-05 PROCEDURE — 90632 HEPA VACCINE ADULT IM: CPT | Mod: GA | Performed by: FAMILY MEDICINE

## 2019-02-05 RX ORDER — EPINEPHRINE 0.3 MG/.3ML
0.3 INJECTION SUBCUTANEOUS
Qty: 2 ML | Refills: 0 | Status: SHIPPED | OUTPATIENT
Start: 2019-02-05 | End: 2024-01-17

## 2019-02-05 RX ORDER — AZITHROMYCIN 500 MG/1
TABLET, FILM COATED ORAL
Qty: 3 TABLET | Refills: 0 | Status: SHIPPED | OUTPATIENT
Start: 2019-02-05 | End: 2019-02-25

## 2019-02-05 NOTE — PATIENT INSTRUCTIONS
Today February 5, 2019 you received the    Hepatitis A Vaccine - Please return on 8/4/19 or later for your 2nd and final dose.    Typhoid - injectable. This vaccine is valid for two years.   .    These appointments can be made as a NURSE ONLY visit.    **It is very important for the vaccinations to be given on the scheduled day(s), this helps ensure you receive the full effectiveness of the vaccine.**    Please call [facility] with any questions [phone]    Thank you for visiting [facility]

## 2019-02-05 NOTE — PROGRESS NOTES
Itinerary:  [place]    Departure Date: 2/11    Return Date: 2/22    Length of Trip: 11 days    Purpose of Trip: pleasure     Urban/Rural: both     Accommodations: hotel and family home    IMMUNIZATION HISTORY  Have you received any immunizations within the past 4 weeks?  No  Have you ever fainted from having your blood drawn or from an injection?  No  Have you ever had a fever reaction to vaccination?  No  Have you ever had any bad reaction or side effect from any vaccination?  No  Have you ever had hepatitis A or B vaccine?  unknown  Do you live (or work closely) with anyone who has AIDS, an AIDS-like condition, any other immune disorder or who is on chemotherapy for cancer or a   family history of immunodeficiency?  No  Have you received any injection of immune globulin or any blood products during the past 12 months?  No    Patient roomed by TICO Durán     Special medical concerns: none    /84   Temp 98.4  F (36.9  C) (Oral)   EXAM: deferred    Immunizations discussed include: Hepatitis A and Typhoid  Malaraia prophylaxis recommended: insect precautions only recommended for locations he will be going - reviewed and book given  Symptomatic treatment for traveler's diarrhea: azithromycin    ASSESSMENT/PLAN:  1. Systemic reaction to bee sting, undetermined intent, subsequent encounter   refilled for prn use   - EPINEPHrine (EPIPEN/ADRENACLICK/OR ANY BX GENERIC EQUIV) 0.3 MG/0.3ML injection 2-pack; Inject 0.3 mLs (0.3 mg) into the muscle once as needed for anaphylaxis  Dispense: 2 mL; Refill: 0    2. Vaccine for single bacterial disease     - HEPA VACCINE ADULT IM  - TYPHOID VACCINE, IM  - ADMIN 1st VACCINE  - EA ADD'L VACCINE    3. Counseling about travel     - azithromycin (ZITHROMAX) 500 MG tablet; Take one tablet daily for up to 3 days as needed for traveler's diarrhea  Dispense: 3 tablet; Refill: 0  I have reviewed general recommendations for safe travel   including: food/water precautions, insect  avoidance,   roadway safety. Educational materials and [employer] report provided.    Total visit time 30 minutes with over 50% of time spent counseling patient.

## 2019-02-25 ENCOUNTER — OFFICE VISIT (OUTPATIENT)
Dept: FAMILY MEDICINE | Facility: CLINIC | Age: 49
End: 2019-02-25
Payer: COMMERCIAL

## 2019-02-25 VITALS
BODY MASS INDEX: 41.51 KG/M2 | RESPIRATION RATE: 16 BRPM | WEIGHT: 273 LBS | SYSTOLIC BLOOD PRESSURE: 122 MMHG | TEMPERATURE: 98.7 F | HEART RATE: 61 BPM | OXYGEN SATURATION: 99 % | DIASTOLIC BLOOD PRESSURE: 81 MMHG

## 2019-02-25 DIAGNOSIS — J01.00 ACUTE MAXILLARY SINUSITIS, RECURRENCE NOT SPECIFIED: Primary | ICD-10-CM

## 2019-02-25 PROCEDURE — 99213 OFFICE O/P EST LOW 20 MIN: CPT | Performed by: PHYSICIAN ASSISTANT

## 2019-02-25 ASSESSMENT — PAIN SCALES - GENERAL: PAINLEVEL: NO PAIN (0)

## 2019-02-25 NOTE — PROGRESS NOTES
SUBJECTIVE:   Salvatore Kim is a 48 year old male who presents to clinic today for the following health issues:      ENT Symptoms             Symptoms: cc Present Absent Comment   Fever/Chills  x  chills   Fatigue  x     Muscle Aches   x    Eye Irritation  x     Sneezing   x    Nasal Richard/Drg  x     Sinus Pressure/Pain  x     Loss of smell   x Unsure possilbe   Dental pain  x  A little   Sore Throat   x dry   Swollen Glands   x    Ear Pain/Fullness  x  fullness   Cough  x     Wheeze   x    Chest Pain   x    Shortness of breath  x     Rash   x    Other  x  dizziness     Symptom duration:  5-6 days since he has been home from his trip, but started prior to that.    Symptom severity:  severe   Treatments tried:     Contacts:             Problem list and histories reviewed & adjusted, as indicated.  Additional history: as documented    Patient Active Problem List   Diagnosis     Mild major depression (H)     Tobacco abuse     Presbyopia OU     Hypertriglyceridemia     Metabolic syndrome     Impotence     Allergic reaction     HNP (herniated nucleus pulposus), lumbar     Anal fissure     CARDIOVASCULAR SCREENING; LDL GOAL LESS THAN 130     Vitamin deficiency     Esophageal reflux (GERD)     BMI 40.0-44.9, adult (H)     Past Surgical History:   Procedure Laterality Date     COLONOSCOPY  6/7/2013    Procedure: COLONOSCOPY;  COLONOSCOPY, EXAM UNDER ANESTHESIA,  FISTULOTOMY;  Surgeon: Maxwell Israel MD;  Location: [facility]     EXAM UNDER ANESTHESIA ANUS  6/7/2013    Procedure: EXAM UNDER ANESTHESIA ANUS;;  Surgeon: Maxwell Israel MD;  Location: [facility]     FISTULOTOMY RECTUM  6/7/2013    Procedure: FISTULOTOMY RECTUM;;  Surgeon: Maxwell Israel MD;  Location: [facility]     HEMORRHOID SURGERY       RELEASE CARPAL TUNNEL  2001    Rt       Social History     Tobacco Use     Smoking status: Former Smoker     Packs/day: 1.50     Years: 25.00     Pack years: 37.50     Types: Cigarettes     Smokeless tobacco: Never Used     Tobacco  comment: quit 3 weeks ago.   Substance Use Topics     Alcohol use: Yes     Alcohol/week: 2.0 oz     Types: 4 Standard drinks or equivalent per week     Comment: RARE, socially      Family History   Problem Relation Age of Onset     Myocardial Infarction Father 45     Heart Disease Father      C.A.D. Father      Myocardial Infarction Paternal Grandfather 45     Myocardial Infarction Maternal Grandfather 45     Cancer Mother      Diabetes Paternal Grandmother      Diabetes Paternal Aunt      Cancer - colorectal No family hx of      Prostate Cancer No family hx of      Hypertension No family hx of      Cerebrovascular Disease No family hx of      Thyroid Disease No family hx of      Glaucoma No family hx of      Macular Degeneration No family hx of          Allergies   Allergen Reactions     Bees Swelling     Chocolate      BP Readings from Last 3 Encounters:   02/25/19 122/81   02/05/19 127/84   01/21/19 124/78    Wt Readings from Last 3 Encounters:   02/25/19 123.8 kg (273 lb)   01/21/19 132.3 kg (291 lb 9.6 oz)   11/29/18 124.7 kg (275 lb)                    Reviewed and updated as needed this visit by clinical staff       Reviewed and updated as needed this visit by Provider         ROS:  Constitutional, HEENT, cardiovascular, pulmonary, GI, , musculoskeletal, neuro, skin, endocrine and psych systems are negative, except as otherwise noted.    OBJECTIVE:     /81   Pulse 61   Temp 98.7  F (37.1  C) (Oral)   Resp 16   Wt 123.8 kg (273 lb)   SpO2 99%   BMI 41.51 kg/m    Body mass index is 41.51 kg/m .  General appearance - healthy, alert and no distress  Skin - Skin color, texture, turgor normal. No rashes or lesions.  Head - Normocephalic. No masses, lesions, tenderness or abnormalities  Eyes - conjunctivae/corneas clear. PERRL, EOM's intact.   Ears - External ears normal. Canals clear. TM's normal.  Nose/Sinuses - Nares normal. Septum midline. Mucosa erythematous. Tenderness over maxillary sinuses.    Oropharynx - Lips, mucosa, and tongue normal. Teeth and gums normal.  Neck - Neck supple. No adenopathy. Thyroid symmetric, normal size,  Lungs - Good diaphragmatic excursion. Lungs clear  Heart -RRR. No murmurs, clicks gallops or rub      Diagnostic Test Results:  none     ASSESSMENT/PLAN:       1. Acute maxillary sinusitis, recurrence not specified  Treat with augmentin. Side effects and how to take the medication discussed.  Continue with symptomatic treatments with OTC medications also, rest and fluids.   He will plan to follow up with his primary provider if symptoms worsen or persist.   - amoxicillin-clavulanate (AUGMENTIN) 875-125 MG tablet; Take 1 tablet by mouth 2 times daily for 10 days  Dispense: 20 tablet; Refill: 0      Kristen M. Kehr, PA-C  [facility]

## 2019-02-25 NOTE — NURSING NOTE
"Chief Complaint   Patient presents with     URI       Initial /81   Pulse 61   Temp 98.7  F (37.1  C) (Oral)   Resp 16   Wt 123.8 kg (273 lb)   SpO2 99%   BMI 41.51 kg/m   Estimated body mass index is 41.51 kg/m  as calculated from the following:    Height as of 11/29/18: 1.727 m (5' 8\").    Weight as of this encounter: 123.8 kg (273 lb).  Medication Reconciliation: complete    KATY Bedoya MA    "

## 2019-10-03 ENCOUNTER — HEALTH MAINTENANCE LETTER (OUTPATIENT)
Age: 49
End: 2019-10-03

## 2019-10-14 ENCOUNTER — TELEPHONE (OUTPATIENT)
Dept: FAMILY MEDICINE | Facility: CLINIC | Age: 49
End: 2019-10-14

## 2019-10-14 ENCOUNTER — OFFICE VISIT (OUTPATIENT)
Dept: FAMILY MEDICINE | Facility: CLINIC | Age: 49
End: 2019-10-14
Payer: OTHER MISCELLANEOUS

## 2019-10-14 VITALS
OXYGEN SATURATION: 95 % | HEIGHT: 67 IN | TEMPERATURE: 98 F | HEART RATE: 54 BPM | WEIGHT: 290 LBS | DIASTOLIC BLOOD PRESSURE: 71 MMHG | SYSTOLIC BLOOD PRESSURE: 109 MMHG | BODY MASS INDEX: 45.52 KG/M2

## 2019-10-14 DIAGNOSIS — M25.512 ACUTE PAIN OF LEFT SHOULDER: Primary | ICD-10-CM

## 2019-10-14 PROCEDURE — 99203 OFFICE O/P NEW LOW 30 MIN: CPT | Performed by: NURSE PRACTITIONER

## 2019-10-14 RX ORDER — IBUPROFEN 600 MG/1
600 TABLET, FILM COATED ORAL EVERY 6 HOURS PRN
Qty: 30 TABLET | Refills: 1 | Status: SHIPPED | OUTPATIENT
Start: 2019-10-14 | End: 2023-03-16

## 2019-10-14 ASSESSMENT — PATIENT HEALTH QUESTIONNAIRE - PHQ9: SUM OF ALL RESPONSES TO PHQ QUESTIONS 1-9: 0

## 2019-10-14 ASSESSMENT — MIFFLIN-ST. JEOR: SCORE: 2126.12

## 2019-10-14 ASSESSMENT — PAIN SCALES - GENERAL: PAINLEVEL: SEVERE PAIN (6)

## 2019-10-14 NOTE — PROGRESS NOTES
"Subjective     Salvatore Kim is a 49 year old male who presents to clinic today for the following health issues:    HPI   He works for [employer]  He states he was climbing up a ladder on 10/2/19 and had immediate onset pain to left shoulder   He has no pain at rest, but has continued to have pain to left anterior shoulder and distal biceps tendon with lifting and activity  He has not taken anything for the pain  Denies weakness or paresthesias    He had right rotator cuff repair in 2017  He states that pain feels similar to what he is experiencing now, though in 2017 the mobility of his right shoulder was severely impaired whereas now it is not impaired  He is concerned though because he states back in 2017 he was told to do physical therapy and that they \"missed\" the tear by treating it conservatively        Patient Active Problem List   Diagnosis     Mild major depression (H)     Tobacco abuse     Presbyopia OU     Hypertriglyceridemia     Metabolic syndrome     Impotence     Allergic reaction     HNP (herniated nucleus pulposus), lumbar     Anal fissure     CARDIOVASCULAR SCREENING; LDL GOAL LESS THAN 130     Vitamin deficiency     Esophageal reflux (GERD)     BMI 40.0-44.9, adult (H)     Past Surgical History:   Procedure Laterality Date     COLONOSCOPY  6/7/2013    Procedure: COLONOSCOPY;  COLONOSCOPY, EXAM UNDER ANESTHESIA,  FISTULOTOMY;  Surgeon: Maxwell Israel MD;  Location: [facility]     EXAM UNDER ANESTHESIA ANUS  6/7/2013    Procedure: EXAM UNDER ANESTHESIA ANUS;;  Surgeon: Maxwell Israel MD;  Location: [facility]     FISTULOTOMY RECTUM  6/7/2013    Procedure: FISTULOTOMY RECTUM;;  Surgeon: Maxwell Israel MD;  Location: [facility]     HEMORRHOID SURGERY       RELEASE CARPAL TUNNEL  2001    Rt     ROTATOR CUFF REPAIR RT/LT Right 2017       Social History     Tobacco Use     Smoking status: Former Smoker     Packs/day: 1.50     Years: 25.00     Pack years: 37.50     Types: Cigarettes     " "Smokeless tobacco: Never Used     Tobacco comment: quit 3 weeks ago.   Substance Use Topics     Alcohol use: Yes     Alcohol/week: 3.3 standard drinks     Types: 4 Standard drinks or equivalent per week     Comment: RARE, socially      Family History   Problem Relation Age of Onset     Myocardial Infarction Father 45     Heart Disease Father      C.A.D. Father      Myocardial Infarction Paternal Grandfather 45     Myocardial Infarction Maternal Grandfather 45     Cancer Mother      Diabetes Paternal Grandmother      Diabetes Paternal Aunt      Cancer - colorectal No family hx of      Prostate Cancer No family hx of      Hypertension No family hx of      Cerebrovascular Disease No family hx of      Thyroid Disease No family hx of      Glaucoma No family hx of      Macular Degeneration No family hx of              Reviewed and updated as needed this visit by Provider         Review of Systems   ROS COMP: Constitutional, HEENT, cardiovascular, pulmonary, gi and gu systems are negative, except as otherwise noted.      Objective    /71 (BP Location: Right arm, Patient Position: Chair, Cuff Size: Adult Large)   Pulse 54   Temp 98  F (36.7  C) (Oral)   Ht 1.689 m (5' 6.5\")   Wt 131.5 kg (290 lb)   SpO2 95%   BMI 46.11 kg/m    Body mass index is 46.11 kg/m .  Physical Exam   GENERAL: healthy, alert and no distress  RESP: lungs clear to auscultation - no rales, rhonchi or wheezes  CV: regular rate and rhythm, normal S1 S2, no S3 or S4, no murmur, click or rub, no peripheral edema and peripheral pulses strong  MS: No tenderness left clavicle, shoulder, biceps or scapular. ROM intact with flexion, abduction and extension. He reports pain with empty can test and drop arm test though strength 5/5  SKIN: no suspicious lesions or rashes  NEURO: Normal strength and tone, mentation intact and speech normal    Diagnostic Test Results:  Labs reviewed in Epic        Assessment & Plan       ICD-10-CM    1. Acute pain of left " shoulder M25.512 ORTHO  REFERRAL     [facility]     ibuprofen (ADVIL/MOTRIN) 600 MG tablet     I suspect he has a mild rotator cuff pathology. ROM and strength are intact. May have some distal biceps tendonitis though no pain on exam. I recommend treating with rest, ice, NSAIDs, physical therapy and activity restrictions. He is interested in seeing an orthopedist given his past experience with rotator cuff tear of the right shoulder so I did agree to place referral today. As his ROM is intact and he does not appear in pain in exam, I think this is unlikely to represent a surgical case. Letter written for lifting restrictions at work (no greater than 10 pounds). I still do recommend scheduling with physical therapy, but ok to schedule with ortho.     Patient Instructions   Take 600 mg ibuprofen three times daily (with food) for three days for anti-inflammatory effect. After three days, take as needed for pain        RAUL Bravo [facility]

## 2019-10-14 NOTE — PATIENT INSTRUCTIONS
Take 600 mg ibuprofen three times daily (with food) for three days for anti-inflammatory effect. After three days, take as needed for pain

## 2019-10-14 NOTE — TELEPHONE ENCOUNTER
Panel Management Review      Patient has the following on his problem list:     Depression / Dysthymia review    Measure:  Needs PHQ-9 score of 4 or less during index window.  Administer PHQ-9 and if score is 5 or more, send encounter to provider for next steps.    5 - 7 month window range: ?    PHQ-9 SCORE 2/2/2015 6/8/2017 10/14/2019   PHQ-9 Total Score 2 - -   PHQ-9 Total Score - 2 0       If PHQ-9 recheck is 5 or more, route to provider for next steps.    Patient is due for:  PHQ9      Composite cancer screening  Chart review shows that this patient is due/due soon for the following None  Summary:    Patient is due/failing the following:   PHQ9    Action needed:   Patient in ;office today and discussed HM item due, completed PHQ9.    Type of outreach:    see above    Questions for provider review:    None                                                                                                                                    CITLALLI Owen MA       Chart routed to closing chart .

## 2019-10-14 NOTE — LETTER
[address] [address]  Phone: [phone]  Fax: [phone]    October 14, 2019        Salvatore Kim  [address] [place] [id number]          To whom it may concern:    RE: Salvatore Kim    Patient was seen and treated today at our clinic for left shoulder pain after a work related injury. He may continue to work with the following restrictions: nothing to lift greater than 10 pounds with left arm. Restrictions in effect until he can be seen by an orthopedist who can provider further instructions.     Please contact me for questions or concerns.      Sincerely,        RAUL Bravo CNP

## 2019-10-24 ENCOUNTER — ANCILLARY PROCEDURE (OUTPATIENT)
Dept: GENERAL RADIOLOGY | Facility: CLINIC | Age: 49
End: 2019-10-24
Attending: PHYSICIAN ASSISTANT
Payer: OTHER MISCELLANEOUS

## 2019-10-24 ENCOUNTER — OFFICE VISIT (OUTPATIENT)
Dept: ORTHOPEDICS | Facility: CLINIC | Age: 49
End: 2019-10-24
Payer: OTHER MISCELLANEOUS

## 2019-10-24 VITALS
HEIGHT: 67 IN | RESPIRATION RATE: 16 BRPM | WEIGHT: 290 LBS | DIASTOLIC BLOOD PRESSURE: 72 MMHG | BODY MASS INDEX: 45.52 KG/M2 | SYSTOLIC BLOOD PRESSURE: 131 MMHG

## 2019-10-24 DIAGNOSIS — S46.912A SHOULDER STRAIN, LEFT, INITIAL ENCOUNTER: ICD-10-CM

## 2019-10-24 DIAGNOSIS — S46.212A BICEPS STRAIN, LEFT, INITIAL ENCOUNTER: ICD-10-CM

## 2019-10-24 DIAGNOSIS — M25.512 ACUTE PAIN OF LEFT SHOULDER: ICD-10-CM

## 2019-10-24 DIAGNOSIS — M25.512 ACUTE PAIN OF LEFT SHOULDER: Primary | ICD-10-CM

## 2019-10-24 PROCEDURE — 73030 X-RAY EXAM OF SHOULDER: CPT | Mod: LT

## 2019-10-24 PROCEDURE — 99244 OFF/OP CNSLTJ NEW/EST MOD 40: CPT | Performed by: ORTHOPAEDIC SURGERY

## 2019-10-24 ASSESSMENT — MIFFLIN-ST. JEOR: SCORE: 2126.12

## 2019-10-24 ASSESSMENT — PAIN SCALES - GENERAL: PAINLEVEL: NO PAIN (0)

## 2019-10-24 NOTE — LETTER
10/24/2019         RE: Salvatore Kim  [address]  [place] [address]        Dear Colleague,    Thank you for referring your patient, Salvatore Kim, to the [facility]. Please see a copy of my visit note below.    CHIEF COMPLAINT:   Chief Complaint   Patient presents with     Right Shoulder - Pain     Work Comp. Onset: 10/2/19. Patient was climbing up a ladder and had sudden onset of pain. No fall or injury. Pain is lateral upper arm.      Shoulder     Right hand dominant. Had right rotator cuff repair in the past. Has full ROM with left shoulder, but not with holding weight.    .  Salvatore Kim is seen today in the [facility] for evaluation of left shoulder pain at the request of RAUL Prakash CNP      HISTORY:  Salvatore Kim is a 49 year old male, right  -hand dominant, who is seen in consultation at the request of RAUL Barnes CNP for left  shoulder pain that started 3 weeks ago. On 10/2/2019, was at work climbing a ladder and had sudden onset of pain in the left shoulder and arm. No fall, injury, or anything like that. Locates pain along the side of the arm/shoulder and into the biceps. Has good range of motion and strength. Aggravated with lifting and pulling. Treatment with ibuprofen which helps, as well as decreased activities. Pain comes and goes, throbbing. Also having pain in his neck and down to the elbow. Denies numbness and tingling. Has pain at night, rest. Overall has improved since onset with decreased activities, lifting restrictions.  .    Prior right shoulder injury, rotator cuff repair elsewhere 2017. He states the left shoulder feels different than the right shoulder. The right shoulder pain was worse and couldn't lift his arm at all.    Aggrevated by: lifting, pulling  Relieved by: not using the arm  Pain location: lateral shoulder, deltoid and upper arm and down the biceps to the elbow  Pain severity: 0/10  Pain  quality: dull, aching, sharp and throbbing  Frequency of symptoms: occasionally  Associated symptoms: neck pain, radiating pain to elbow    Treatment up to this point:NSAIDS  Has not tried: ice, Heat, Tylenol, PT and Corticosteroid injection    Usual level of work activity: .    Other PMH:  has a past medical history of Allergies, Arthritis, Depression, anxiety, ED (erectile dysfunction), Hyperlipidemia, and Metabolic syndrome.  Patient Active Problem List   Diagnosis     Mild major depression (H)     Tobacco abuse     Presbyopia OU     Hypertriglyceridemia     Metabolic syndrome     Impotence     Allergic reaction     HNP (herniated nucleus pulposus), lumbar     Anal fissure     CARDIOVASCULAR SCREENING; LDL GOAL LESS THAN 130     Vitamin deficiency     Esophageal reflux (GERD)     BMI 40.0-44.9, adult (H)       Surgical Hx:  has a past surgical history that includes Hemorrhoid surgery; Release carpal tunnel (2001); Colonoscopy (6/7/2013); Exam under anesthesia anus (6/7/2013); Fistulotomy rectum (6/7/2013); and rotator cuff repair rt/lt (Right, 2017).    Medications:   Current Outpatient Medications:      EPINEPHrine (EPIPEN/ADRENACLICK/OR ANY BX GENERIC EQUIV) 0.3 MG/0.3ML injection 2-pack, Inject 0.3 mLs (0.3 mg) into the muscle once as needed for anaphylaxis, Disp: 2 mL, Rfl: 0     ibuprofen (ADVIL/MOTRIN) 600 MG tablet, Take 1 tablet (600 mg) by mouth every 6 hours as needed for moderate pain, Disp: 30 tablet, Rfl: 1     sildenafil (VIAGRA) 100 MG tablet, Take 1 tablet (100 mg) by mouth daily as needed (ED), Disp: 10 tablet, Rfl: 3    Allergies:   Allergies   Allergen Reactions     Bees Swelling     Chocolate        Social Hx: .  reports that he has quit smoking. His smoking use included cigarettes. He has a 37.50 pack-year smoking history. He has never used smokeless tobacco. He reports current alcohol use of about 3.3 standard drinks of alcohol per week. He reports that he does not use  "drugs.    Family Hx: family history includes C.A.D. in his father; Cancer in his mother; Diabetes in his paternal aunt and paternal grandmother; Heart Disease in his father; Myocardial Infarction (age of onset: 45) in his father, maternal grandfather, and paternal grandfather..    REVIEW OF SYSTEMS: 10 point ROS neg other than the symptoms noted above in the HPI and PMH. Notables include  CONSTITUTIONAL:NEGATIVE for fever, chills, change in weight  INTEGUMENTARY/SKIN: NEGATIVE for worrisome rashes, moles or lesions  MUSCULOSKELETAL:See HPI above  NEURO: NEGATIVE for weakness, dizziness or paresthesias    PHYSICAL EXAM:  /72   Resp 16   Ht 1.689 m (5' 6.5\")   Wt 131.5 kg (290 lb)   BMI 46.11 kg/m      GENERAL APPEARANCE: healthy, alert, no distress  SKIN: no suspicious lesions or rashes  NEURO: Normal strength and tone, mentation intact and speech normal  PSYCH:  mentation appears normal and affect normal, not anxious  RESPIRATORY: No increased work of breathing.  VASCULAR: Radial pulses 2+ and brisk cappillary refill   LYMPH: no palpable axillary lymphadenopathy or cervical neck lymphadenopathy.      MUSCULOSKELETAL:    NECK:  Cervical range of motion: fullcauses pain in neck, does not cause pain into shoulder or reproduce shoulder pain  Posterior cervical spine nontender to palpation over midline bony prominences  There is mild tender to palpation along neck paraspinals and trapezius muscles      RIGHT UPPER EXTREMITY:  Sensation intact to light touch in median, radial, ulnar and axillary nerve distributions  Palpable 2+ radial pulse, brisk capillary refill to all fingers, wwp  Intact epl fpl fdp edc wrist flexion/extension biceps triceps deltoid    RIGHT SHOULDER:  Shoulder Inspection: no swelling, bruising, discoloration, slight \"mariella\" deformity  Arthroscopic surgical scars as well as longitudinal axillary scar (open biceps tenodesis)  Range of Motion:   Active:forward flexion 170 degrees, external " rotation  70 degrees, internal rotation  L2  Strength: forward flexion 5/5, External rotation 5/5  Liftoff: Able  Impingement: equivocal.  Special tests: Empty Can: Negative    LEFT UPPER EXTREMITY:  Sensation intact to light touch in median, radial, ulnar and axillary nerve distributions  Palpable 2+ radial pulse, brisk capillary refill to all fingers, wwp  Intact epl fpl fdp edc wrist flexion/extension biceps triceps deltoid    LEFT SHOULDER:  Shoulder Inspection: no swelling, bruising, discoloration, or obvious deformity or asymmetry  Tender: anterior capsule, proximal bicep tendon, greater tuberosity, supraspinatus , upper trapezius muscle, rhomboids and biceps muscle belly/distal biceps tendon  Non-tender: AC joint  Range of Motion:   Active:forward flexion 170 degrees, external rotation  70 degrees, internal rotation  L4  Strength: forward flexion 5/5, External rotation 5/5   Impingement: all grade 2 positive  Special tests: Yergason's: Positive  Speed: Positive  Belly Press: Negative  Empty Can: Negative      X-RAY INTERPRETATION: 3 views left  shoulder obtained 10/24/2019 were reviewed personally in clinic today with the patient. On my review, Minimal osteoarthrosis of the AC joint. Tiny calcification along the undersurface of the distal clavicle.        ASSESSMENT: Salvatore Kim is a 49 year old male, right  -hand dominant with acute onset left shoulder pain, shoulder and biceps strain, impingement.      PLAN:   * Reviewed imaging studies with patient. Also, clinical exam findings. Consistent with shoulder and biceps strain.    Treatment:    * Rest  * Activity modification - avoid activities that aggravate symptoms or started symptoms at onset.  * NSAIDS - regular use for inflammation, with food, as long as no contra-indications. Please discuss with pcp if needed.  * Ice twice daily to three times daily, 15-20 minutes at a time  * heat may be beneficial prior to exercising  * Physical Therapy for  strengthening, stretching and range of motion exercises of rotator cuff and periscapular stabilization.  * Tylenol as needed for pain  * Injections: cortisone injections may be beneficial to help decrease swelling and inflammation within the shoulder or bursa, and decrease pain. With decreased pain, Physical Therapy and exercises will be more effective and efficient. Patient elected NOT to proceed.  * Return to clinic as needed.  * consider subacromial and/or proximal biceps sheath injection as needed.  * consider MRI of the shoulder in future if symptoms persist despite the above regimen of treatment.  * reassess 3 weeks.  * workability: 10 pounds lifting restriction.    Chapin Hernandez M.D., M.S.  Dept. of Orthopaedic Surgery  [facility]    Again, thank you for allowing me to participate in the care of your patient.        Sincerely,        Chaipn Hernandez MD

## 2019-10-24 NOTE — LETTER
[facility] [place]  [address] [address]  [place] [address] [phone]  WORKABILITY    [facility], Dr. Chapin Hernandez M.D.  Jody Hartman [place]        10/24/2019      RE: Salvatore Kim    [address]  [place] [address]        To whom it may concern:     Salvatore Kim is under my care for   1. Acute pain of left shoulder    .     Work related injury: Yes       Date of injury: 10/2/19          Return to work date: 10/24/19     ** WITH RESTRICTIONS? Yes, with work restrictions: * Other: 10 lb lifting restriction with left upper extremity.  DURATION OF LIMITATIONS: 3 weeks        Next appointment: 3 weeks        Electronically Signed (as below)  Dr. Chapin Hernandez M.D.

## 2019-10-24 NOTE — PROGRESS NOTES
CHIEF COMPLAINT:   Chief Complaint   Patient presents with     Right Shoulder - Pain     Work Comp. Onset: 10/2/19. Patient was climbing up a ladder and had sudden onset of pain. No fall or injury. Pain is lateral upper arm.      Shoulder     Right hand dominant. Had right rotator cuff repair in the past. Has full ROM with left shoulder, but not with holding weight.    .  Salvatore Kim is seen today in the [facility] for evaluation of left shoulder pain at the request of RAUL Prakash CNP      HISTORY:  Salvatore Kim is a 49 year old male, right  -hand dominant, who is seen in consultation at the request of RAUL Barnes CNP for left  shoulder pain that started 3 weeks ago. On 10/2/2019, was at work climbing a ladder and had sudden onset of pain in the left shoulder and arm. No fall, injury, or anything like that. Locates pain along the side of the arm/shoulder and into the biceps. Has good range of motion and strength. Aggravated with lifting and pulling. Treatment with ibuprofen which helps, as well as decreased activities. Pain comes and goes, throbbing. Also having pain in his neck and down to the elbow. Denies numbness and tingling. Has pain at night, rest. Overall has improved since onset with decreased activities, lifting restrictions.  .    Prior right shoulder injury, rotator cuff repair elsewhere 2017. He states the left shoulder feels different than the right shoulder. The right shoulder pain was worse and couldn't lift his arm at all.    Aggrevated by: lifting, pulling  Relieved by: not using the arm  Pain location: lateral shoulder, deltoid and upper arm and down the biceps to the elbow  Pain severity: 0/10  Pain quality: dull, aching, sharp and throbbing  Frequency of symptoms: occasionally  Associated symptoms: neck pain, radiating pain to elbow    Treatment up to this point:NSAIDS  Has not tried: ice, Heat, Tylenol, PT and Corticosteroid injection    Usual  level of work activity: DEUS.    Other PMH:  has a past medical history of Allergies, Arthritis, Depression, anxiety, ED (erectile dysfunction), Hyperlipidemia, and Metabolic syndrome.  Patient Active Problem List   Diagnosis     Mild major depression (H)     Tobacco abuse     Presbyopia OU     Hypertriglyceridemia     Metabolic syndrome     Impotence     Allergic reaction     HNP (herniated nucleus pulposus), lumbar     Anal fissure     CARDIOVASCULAR SCREENING; LDL GOAL LESS THAN 130     Vitamin deficiency     Esophageal reflux (GERD)     BMI 40.0-44.9, adult (H)       Surgical Hx:  has a past surgical history that includes Hemorrhoid surgery; Release carpal tunnel (2001); Colonoscopy (6/7/2013); Exam under anesthesia anus (6/7/2013); Fistulotomy rectum (6/7/2013); and rotator cuff repair rt/lt (Right, 2017).    Medications:   Current Outpatient Medications:      EPINEPHrine (EPIPEN/ADRENACLICK/OR ANY BX GENERIC EQUIV) 0.3 MG/0.3ML injection 2-pack, Inject 0.3 mLs (0.3 mg) into the muscle once as needed for anaphylaxis, Disp: 2 mL, Rfl: 0     ibuprofen (ADVIL/MOTRIN) 600 MG tablet, Take 1 tablet (600 mg) by mouth every 6 hours as needed for moderate pain, Disp: 30 tablet, Rfl: 1     sildenafil (VIAGRA) 100 MG tablet, Take 1 tablet (100 mg) by mouth daily as needed (ED), Disp: 10 tablet, Rfl: 3    Allergies:   Allergies   Allergen Reactions     Bees Swelling     Chocolate        Social Hx: .  reports that he has quit smoking. His smoking use included cigarettes. He has a 37.50 pack-year smoking history. He has never used smokeless tobacco. He reports current alcohol use of about 3.3 standard drinks of alcohol per week. He reports that he does not use drugs.    Family Hx: family history includes C.A.D. in his father; Cancer in his mother; Diabetes in his paternal aunt and paternal grandmother; Heart Disease in his father; Myocardial Infarction (age of onset: 45) in his father, maternal grandfather,  "and paternal grandfather..    REVIEW OF SYSTEMS: 10 point ROS neg other than the symptoms noted above in the HPI and PMH. Notables include  CONSTITUTIONAL:NEGATIVE for fever, chills, change in weight  INTEGUMENTARY/SKIN: NEGATIVE for worrisome rashes, moles or lesions  MUSCULOSKELETAL:See HPI above  NEURO: NEGATIVE for weakness, dizziness or paresthesias    PHYSICAL EXAM:  /72   Resp 16   Ht 1.689 m (5' 6.5\")   Wt 131.5 kg (290 lb)   BMI 46.11 kg/m     GENERAL APPEARANCE: healthy, alert, no distress  SKIN: no suspicious lesions or rashes  NEURO: Normal strength and tone, mentation intact and speech normal  PSYCH:  mentation appears normal and affect normal, not anxious  RESPIRATORY: No increased work of breathing.  VASCULAR: Radial pulses 2+ and brisk cappillary refill   LYMPH: no palpable axillary lymphadenopathy or cervical neck lymphadenopathy.      MUSCULOSKELETAL:    NECK:  Cervical range of motion: fullcauses pain in neck, does not cause pain into shoulder or reproduce shoulder pain  Posterior cervical spine nontender to palpation over midline bony prominences  There is mild tender to palpation along neck paraspinals and trapezius muscles      RIGHT UPPER EXTREMITY:  Sensation intact to light touch in median, radial, ulnar and axillary nerve distributions  Palpable 2+ radial pulse, brisk capillary refill to all fingers, wwp  Intact epl fpl fdp edc wrist flexion/extension biceps triceps deltoid    RIGHT SHOULDER:  Shoulder Inspection: no swelling, bruising, discoloration, slight \"mariella\" deformity  Arthroscopic surgical scars as well as longitudinal axillary scar (open biceps tenodesis)  Range of Motion:   Active:forward flexion 170 degrees, external rotation  70 degrees, internal rotation  L2  Strength: forward flexion 5/5, External rotation 5/5  Liftoff: Able  Impingement: equivocal.  Special tests: Empty Can: Negative    LEFT UPPER EXTREMITY:  Sensation intact to light touch in median, radial, " ulnar and axillary nerve distributions  Palpable 2+ radial pulse, brisk capillary refill to all fingers, wwp  Intact epl fpl fdp edc wrist flexion/extension biceps triceps deltoid    LEFT SHOULDER:  Shoulder Inspection: no swelling, bruising, discoloration, or obvious deformity or asymmetry  Tender: anterior capsule, proximal bicep tendon, greater tuberosity, supraspinatus , upper trapezius muscle, rhomboids and biceps muscle belly/distal biceps tendon  Non-tender: AC joint  Range of Motion:   Active:forward flexion 170 degrees, external rotation  70 degrees, internal rotation  L4  Strength: forward flexion 5/5, External rotation 5/5   Impingement: all grade 2 positive  Special tests: Yergason's: Positive  Speed: Positive  Belly Press: Negative  Empty Can: Negative      X-RAY INTERPRETATION: 3 views left  shoulder obtained 10/24/2019 were reviewed personally in clinic today with the patient. On my review, Minimal osteoarthrosis of the AC joint. Tiny calcification along the undersurface of the distal clavicle.        ASSESSMENT: Salvatore Kim is a 49 year old male, right  -hand dominant with acute onset left shoulder pain, shoulder and biceps strain, impingement.      PLAN:   * Reviewed imaging studies with patient. Also, clinical exam findings. Consistent with shoulder and biceps strain.    Treatment:    * Rest  * Activity modification - avoid activities that aggravate symptoms or started symptoms at onset.  * NSAIDS - regular use for inflammation, with food, as long as no contra-indications. Please discuss with pcp if needed.  * Ice twice daily to three times daily, 15-20 minutes at a time  * heat may be beneficial prior to exercising  * Physical Therapy for strengthening, stretching and range of motion exercises of rotator cuff and periscapular stabilization.  * Tylenol as needed for pain  * Injections: cortisone injections may be beneficial to help decrease swelling and inflammation within the shoulder or  bursa, and decrease pain. With decreased pain, Physical Therapy and exercises will be more effective and efficient. Patient elected NOT to proceed.  * Return to clinic as needed.  * consider subacromial and/or proximal biceps sheath injection as needed.  * consider MRI of the shoulder in future if symptoms persist despite the above regimen of treatment.  * reassess 3 weeks.  * workability: 10 pounds lifting restriction.    Chapin Hernandez M.D., M.S.  Dept. of Orthopaedic Surgery  [facility]

## 2019-12-11 ENCOUNTER — OFFICE VISIT (OUTPATIENT)
Dept: FAMILY MEDICINE | Facility: CLINIC | Age: 49
End: 2019-12-11
Payer: COMMERCIAL

## 2019-12-11 VITALS
HEART RATE: 56 BPM | WEIGHT: 289 LBS | BODY MASS INDEX: 45.36 KG/M2 | TEMPERATURE: 98.3 F | OXYGEN SATURATION: 99 % | DIASTOLIC BLOOD PRESSURE: 68 MMHG | SYSTOLIC BLOOD PRESSURE: 99 MMHG | HEIGHT: 67 IN | RESPIRATION RATE: 18 BRPM

## 2019-12-11 DIAGNOSIS — M79.10 MYALGIA: ICD-10-CM

## 2019-12-11 DIAGNOSIS — L98.9 SKIN LESION: ICD-10-CM

## 2019-12-11 DIAGNOSIS — S16.1XXA STRAIN OF NECK MUSCLE, INITIAL ENCOUNTER: Primary | ICD-10-CM

## 2019-12-11 DIAGNOSIS — M65.30 TRIGGER FINGER, ACQUIRED: ICD-10-CM

## 2019-12-11 PROCEDURE — 99214 OFFICE O/P EST MOD 30 MIN: CPT | Performed by: PHYSICIAN ASSISTANT

## 2019-12-11 ASSESSMENT — MIFFLIN-ST. JEOR: SCORE: 2121.59

## 2019-12-11 NOTE — PROGRESS NOTES
Subjective     Salvatore Kim is a 49 year old male who presents to clinic today for the following multiple health issues:    HPI   1. Neck Pain      Duration: 1.5 months     Description:  Location: left sided neck pain   Radiation: has had pain in the left arm as well at times, unsure if from the neck  Over all feeling better.     Intensity:  varaible    Accompanying signs and symptoms:     History (similar episodes/previous evaluation): None    Precipitating or alleviating factors: None    Therapies tried and outcome: None    2. Right hand pointer finger pain x 2 weeks, no injury - 4 wks of finger locking. Pain in a.m.     3. Off and on myalgias and body swelling.  He is not sure if it is diet related.  States his symptoms get better when he does diet consists of more vegetables and a little bit of chicken.  He is not sure if he has any food intolerances.  He states he can hardly function and move when his body gets inflamed and swollen.  He denies any specific joint pains related to this.    4. Also interested in weight loss.  Is been working on diet.  He is looking referral for possible surgery.    5.  Also like referral to dermatology for skin screening.  He is got a couple spots on his back that itch all the time and bleed.    Patient Active Problem List   Diagnosis     Mild major depression (H)     Tobacco abuse     Presbyopia OU     Hypertriglyceridemia     Metabolic syndrome     Impotence     Allergic reaction     HNP (herniated nucleus pulposus), lumbar     Anal fissure     CARDIOVASCULAR SCREENING; LDL GOAL LESS THAN 130     Vitamin deficiency     Esophageal reflux (GERD)     BMI 40.0-44.9, adult (H)     Myalgia     Past Surgical History:   Procedure Laterality Date     COLONOSCOPY  6/7/2013    Procedure: COLONOSCOPY;  COLONOSCOPY, EXAM UNDER ANESTHESIA,  FISTULOTOMY;  Surgeon: Maxwell Israel MD;  Location: [facility]     EXAM UNDER ANESTHESIA ANUS  6/7/2013    Procedure: EXAM UNDER ANESTHESIA ANUS;;   Surgeon: Maxwell Israel MD;  Location: [facility]     FISTULOTOMY RECTUM  6/7/2013    Procedure: FISTULOTOMY RECTUM;;  Surgeon: Maxwell Israel MD;  Location: [facility]     HEMORRHOID SURGERY       RELEASE CARPAL TUNNEL  2001    Rt     ROTATOR CUFF REPAIR RT/LT Right 2017       Social History     Tobacco Use     Smoking status: Former Smoker     Packs/day: 1.50     Years: 25.00     Pack years: 37.50     Types: Cigarettes     Smokeless tobacco: Never Used     Tobacco comment: quit 3 weeks ago.   Substance Use Topics     Alcohol use: Yes     Alcohol/week: 3.3 standard drinks     Types: 4 Standard drinks or equivalent per week     Comment: RARE, socially      Family History   Problem Relation Age of Onset     Myocardial Infarction Father 45     Heart Disease Father      C.A.D. Father      Myocardial Infarction Paternal Grandfather 45     Myocardial Infarction Maternal Grandfather 45     Cancer Mother      Diabetes Paternal Grandmother      Diabetes Paternal Aunt      Cancer - colorectal No family hx of      Prostate Cancer No family hx of      Hypertension No family hx of      Cerebrovascular Disease No family hx of      Thyroid Disease No family hx of      Glaucoma No family hx of      Macular Degeneration No family hx of          Current Outpatient Medications   Medication Sig Dispense Refill     EPINEPHrine (EPIPEN/ADRENACLICK/OR ANY BX GENERIC EQUIV) 0.3 MG/0.3ML injection 2-pack Inject 0.3 mLs (0.3 mg) into the muscle once as needed for anaphylaxis 2 mL 0     ibuprofen (ADVIL/MOTRIN) 600 MG tablet Take 1 tablet (600 mg) by mouth every 6 hours as needed for moderate pain 30 tablet 1     sildenafil (VIAGRA) 100 MG tablet Take 1 tablet (100 mg) by mouth daily as needed (ED) 10 tablet 3     Allergies   Allergen Reactions     Bees Swelling     Chocolate      BP Readings from Last 3 Encounters:   12/11/19 99/68   10/24/19 131/72   10/14/19 109/71    Wt Readings from Last 3 Encounters:   12/11/19 131.1 kg (289 lb)   10/24/19  "131.5 kg (290 lb)   10/14/19 131.5 kg (290 lb)           Reviewed and updated as needed this visit by Provider  Tobacco  Allergies  Meds  Problems  Med Hx  Surg Hx  Fam Hx         Review of Systems   ROS COMP: Constitutional, HEENT, cardiovascular, pulmonary, gi and gu systems are negative, except as otherwise noted.      Objective    BP 99/68   Pulse 56   Temp 98.3  F (36.8  C) (Oral)   Resp 18   Ht 1.689 m (5' 6.5\")   Wt 131.1 kg (289 lb)   SpO2 99%   BMI 45.95 kg/m    Body mass index is 45.95 kg/m .  Physical Exam   GENERAL: healthy, alert and no distress  RESP: lungs clear to auscultation - no rales, rhonchi or wheezes  CV: regular rate and rhythm, normal S1 S2, no S3 or S4, no murmur, click or rub, no peripheral edema and peripheral pulses strong  On next exam he is got full range of motion he is got tenderness to the left paracervical musculature.  Negative Spurling's test. has full range of motion of bilateral upper extremities.  He has 5-5 strength bilateral upper extremity.  Right index finger shows locking and tenderness at the palmar aspect of his MCP joint.    Diagnostic Test Results:  none         Assessment & Plan       ICD-10-CM    1. Strain of neck muscle, initial encounter S16.1XXA    2. Myalgia M79.10 RHEUMATOLOGY REFERRAL   3. Trigger finger, acquired M65.30 ORTHOPEDICS ADULT REFERRAL   4. Skin lesion L98.9 DERMATOLOGY REFERRAL   5. BMI 40.0-44.9, adult (H) Z68.41 BARIATRIC ADULT REFERRAL   1.  Talk to patient about his concerns will go ahead and treat this conservatively as I feel this is more musculoskeletal.  He states he is already 35% better.  We talked about activity modification.  Okay for NSAIDs AS NEEDED  2.  Unclear etiology.  Consider a second opinion with rheumatology.  3.  Recommend follow-up with orthopedics for second opinion.  4.  Referral for dermatology at patient's request.  5.  Patient was referred to weight management clinic.      BMI:   Estimated body mass index " "is 45.95 kg/m  as calculated from the following:    Height as of this encounter: 1.689 m (5' 6.5\").    Weight as of this encounter: 131.1 kg (289 lb).   Weight management plan: Patient referred to endocrine and/or weight management specialty  Return in about 4 weeks (around 1/8/2020) for As Needed.    Nghia Momin PA-C  [facility]      "

## 2019-12-12 ENCOUNTER — PREP FOR PROCEDURE (OUTPATIENT)
Dept: ORTHOPEDICS | Facility: CLINIC | Age: 49
End: 2019-12-12

## 2019-12-12 ENCOUNTER — OFFICE VISIT (OUTPATIENT)
Dept: ORTHOPEDICS | Facility: CLINIC | Age: 49
End: 2019-12-12
Payer: OTHER MISCELLANEOUS

## 2019-12-12 VITALS — DIASTOLIC BLOOD PRESSURE: 80 MMHG | HEART RATE: 69 BPM | OXYGEN SATURATION: 95 % | SYSTOLIC BLOOD PRESSURE: 130 MMHG

## 2019-12-12 DIAGNOSIS — M65.30 TRIGGER FINGER, ACQUIRED: Primary | ICD-10-CM

## 2019-12-12 DIAGNOSIS — M65.321 TRIGGER FINGER, RIGHT INDEX FINGER: Primary | ICD-10-CM

## 2019-12-12 PROCEDURE — 99243 OFF/OP CNSLTJ NEW/EST LOW 30: CPT | Performed by: ORTHOPAEDIC SURGERY

## 2019-12-12 ASSESSMENT — PAIN SCALES - GENERAL: PAINLEVEL: MODERATE PAIN (5)

## 2019-12-12 NOTE — PROGRESS NOTES
SUBJECTIVE:   Salvatore Kim is a 49 year old male who is seen in consultation at the request of Nghia Momin PA-C for evaluation of right hand pain that has been present approximately 2 weeks. No known injury. He reports his right index finger gets stuck sometimes, and has a lot of pain in that area. He reports a lot of soreness in the morning.     Right hand pointer finger pain x 2 weeks, no injury - 4 wks of finger locking. Pain in a.m.     Review of Systems:  Constitutional:  NEGATIVE for fever, chills, change in weight  Integumentary/Skin:  NEGATIVE for worrisome rashes, moles or lesions  Eyes:  NEGATIVE for vision changes or irritation  ENT/Mouth:  NEGATIVE for ear, mouth and throat problems  Resp:  NEGATIVE for significant cough or SOB  CV:  NEGATIVE for chest pain, palpitations or peripheral edema  GI:  NEGATIVE for nausea, abdominal pain, heartburn, or change in bowel habits  :  Negative   Musculoskeletal:  See HPI above  Neuro:  NEGATIVE for weakness, dizziness or paresthesias  Endocrine:  NEGATIVE for temperature intolerance, skin/hair changes  Heme/allergy/immune:  NEGATIVE for bleeding problems  Psychiatric:  NEGATIVE for changes in mood or affect    Past Medical History:   Past Medical History:   Diagnosis Date     Allergies      Arthritis      Depression, anxiety      ED (erectile dysfunction)      Hyperlipidemia      Metabolic syndrome      Trigger finger, right      Past Surgical History:   Past Surgical History:   Procedure Laterality Date     COLONOSCOPY  6/7/2013    Procedure: COLONOSCOPY;  COLONOSCOPY, EXAM UNDER ANESTHESIA,  FISTULOTOMY;  Surgeon: Maxwell Israel MD;  Location: [facility]     EXAM UNDER ANESTHESIA ANUS  6/7/2013    Procedure: EXAM UNDER ANESTHESIA ANUS;;  Surgeon: Maxwell Israel MD;  Location: [facility]     FISTULOTOMY RECTUM  6/7/2013    Procedure: FISTULOTOMY RECTUM;;  Surgeon: Maxwell Israel MD;  Location: [facility]     HEMORRHOID SURGERY       RELEASE CARPAL TUNNEL   2001    Rt     ROTATOR CUFF REPAIR RT/LT Right 2017     Family History:   Family History   Problem Relation Age of Onset     Myocardial Infarction Father 45     Heart Disease Father      C.A.D. Father      Myocardial Infarction Paternal Grandfather 45     Myocardial Infarction Maternal Grandfather 45     Cancer Mother      Diabetes Paternal Grandmother      Diabetes Paternal Aunt      Cancer - colorectal No family hx of      Prostate Cancer No family hx of      Hypertension No family hx of      Cerebrovascular Disease No family hx of      Thyroid Disease No family hx of      Glaucoma No family hx of      Macular Degeneration No family hx of      Social History:   Social History     Tobacco Use     Smoking status: Former Smoker     Packs/day: 1.50     Years: 25.00     Pack years: 37.50     Types: Cigarettes     Smokeless tobacco: Never Used     Tobacco comment: quit 3 weeks ago.   Substance Use Topics     Alcohol use: Yes     Alcohol/week: 3.3 standard drinks     Types: 4 Standard drinks or equivalent per week     Comment: RARE, socially      This document serves as a record of the services and decisions personally performed and made by DELLA Lipscomb MD. It was created on his behalf by Alexa Davenport, a trained medical scribe. The creation of this document is based the provider's statements to the medical scribe.    Scribe Alexa Davenport 3:36 PM 12/12/2019       OBJECTIVE:  Physical Exam:  /80 (BP Location: Right arm, Patient Position: Sitting, Cuff Size: Adult Regular)   Pulse 69   SpO2 95%   General Appearance: healthy, alert and no distress   Skin: no suspicious lesions or rashes  Neuro: Normal strength and tone, mentation intact and speech normal  Vascular: good pulses, and cappillary refill   Lymph: no lymphadenopathy   Psych:  mentation appears normal and affect normal/bright  Resp: no increased work of breathing     Right Hand Exam:  Demonstrates triggering of the right index  finger.  Tenderness: over the index finger A1 pulley.    ASSESSMENT:   Right index trigger finger    PLAN:   We talked about the options: anti-inflammatories, taping/splinting the PIP joint periodically, corticosteroid injection, and trigger finger release. I have explained the nature of the surgical procedure, the risks and recovery time with the patient. He has chosen to try taping/splinting and anti-inflammatories. If these conservative measures do not help, he will return in 4 weeks.     The information in this document, created by a scribe for me, accurately reflects the services I personally performed and the decisions made by me. I have reviewed and approved this document for accuracy.     DELLA Lipscomb MD  Dept. Orthopedic Surgery  [facility]

## 2019-12-12 NOTE — LETTER
12/12/2019         RE: Salvatore Kim  [address]  [place] [address]        Dear Colleague,    Thank you for referring your patient, Salvatore Kim, to the [facility]. Please see a copy of my visit note below.    SUBJECTIVE:   Salvatore Kim is a 49 year old male who is seen in consultation at the request of Nghia Momin PA-C for evaluation of right hand pain that has been present approximately 2 weeks. No known injury. He reports his right index finger gets stuck sometimes, and has a lot of pain in that area. He reports a lot of soreness in the morning.     Right hand pointer finger pain x 2 weeks, no injury - 4 wks of finger locking. Pain in a.m.     Review of Systems:  Constitutional:  NEGATIVE for fever, chills, change in weight  Integumentary/Skin:  NEGATIVE for worrisome rashes, moles or lesions  Eyes:  NEGATIVE for vision changes or irritation  ENT/Mouth:  NEGATIVE for ear, mouth and throat problems  Resp:  NEGATIVE for significant cough or SOB  CV:  NEGATIVE for chest pain, palpitations or peripheral edema  GI:  NEGATIVE for nausea, abdominal pain, heartburn, or change in bowel habits  :  Negative   Musculoskeletal:  See HPI above  Neuro:  NEGATIVE for weakness, dizziness or paresthesias  Endocrine:  NEGATIVE for temperature intolerance, skin/hair changes  Heme/allergy/immune:  NEGATIVE for bleeding problems  Psychiatric:  NEGATIVE for changes in mood or affect    Past Medical History:   Past Medical History:   Diagnosis Date     Allergies      Arthritis      Depression, anxiety      ED (erectile dysfunction)      Hyperlipidemia      Metabolic syndrome      Trigger finger, right      Past Surgical History:   Past Surgical History:   Procedure Laterality Date     COLONOSCOPY  6/7/2013    Procedure: COLONOSCOPY;  COLONOSCOPY, EXAM UNDER ANESTHESIA,  FISTULOTOMY;  Surgeon: Maxwell Israel MD;  Location: [facility]     EXAM UNDER ANESTHESIA [facility]  6/7/2013    Procedure: EXAM  UNDER ANESTHESIA ANUS;;  Surgeon: Maxwell Israel MD;  Location: [facility]     FISTULOTOMY RECTUM  6/7/2013    Procedure: FISTULOTOMY RECTUM;;  Surgeon: Maxwell Israel MD;  Location: [facility]     HEMORRHOID SURGERY       RELEASE CARPAL TUNNEL  2001    Rt     ROTATOR CUFF REPAIR RT/LT Right 2017     Family History:   Family History   Problem Relation Age of Onset     Myocardial Infarction Father 45     Heart Disease Father      C.A.D. Father      Myocardial Infarction Paternal Grandfather 45     Myocardial Infarction Maternal Grandfather 45     Cancer Mother      Diabetes Paternal Grandmother      Diabetes Paternal Aunt      Cancer - colorectal No family hx of      Prostate Cancer No family hx of      Hypertension No family hx of      Cerebrovascular Disease No family hx of      Thyroid Disease No family hx of      Glaucoma No family hx of      Macular Degeneration No family hx of      Social History:   Social History     Tobacco Use     Smoking status: Former Smoker     Packs/day: 1.50     Years: 25.00     Pack years: 37.50     Types: Cigarettes     Smokeless tobacco: Never Used     Tobacco comment: quit 3 weeks ago.   Substance Use Topics     Alcohol use: Yes     Alcohol/week: 3.3 standard drinks     Types: 4 Standard drinks or equivalent per week     Comment: RARE, socially      This document serves as a record of the services and decisions personally performed and made by DELLA Lipscomb MD. It was created on his behalf by Alexa Davenport, a trained medical scribe. The creation of this document is based the provider's statements to the medical scribe.    Scribe Alexa Davenport 3:36 PM 12/12/2019       OBJECTIVE:  Physical Exam:  /80 (BP Location: Right arm, Patient Position: Sitting, Cuff Size: Adult Regular)   Pulse 69   SpO2 95%   General Appearance: healthy, alert and no distress   Skin: no suspicious lesions or rashes  Neuro: Normal strength and tone, mentation intact and speech normal  Vascular: good  pulses, and cappillary refill   Lymph: no lymphadenopathy   Psych:  mentation appears normal and affect normal/bright  Resp: no increased work of breathing     Right Hand Exam:  Demonstrates triggering of the right index finger.  Tenderness: over the index finger A1 pulley.    ASSESSMENT:   Right index trigger finger    PLAN:   We talked about the options: anti-inflammatories, taping/splinting the PIP joint periodically, corticosteroid injection, and trigger finger release. I have explained the nature of the surgical procedure, the risks and recovery time with the patient. He has chosen to try taping/splinting and anti-inflammatories. If these conservative measures do not help, he will return in 4 weeks.     The information in this document, created by a scribe for me, accurately reflects the services I personally performed and the decisions made by me. I have reviewed and approved this document for accuracy.     DELLA Lipscomb MD  Dept. Orthopedic Surgery  [facility]      Again, thank you for allowing me to participate in the care of your patient.        Sincerely,        Sabino Lipscomb MD

## 2020-01-09 ENCOUNTER — OFFICE VISIT (OUTPATIENT)
Dept: ORTHOPEDICS | Facility: CLINIC | Age: 50
End: 2020-01-09
Payer: COMMERCIAL

## 2020-01-09 VITALS
HEIGHT: 67 IN | DIASTOLIC BLOOD PRESSURE: 77 MMHG | BODY MASS INDEX: 45.36 KG/M2 | HEART RATE: 76 BPM | OXYGEN SATURATION: 96 % | WEIGHT: 289 LBS | SYSTOLIC BLOOD PRESSURE: 132 MMHG

## 2020-01-09 DIAGNOSIS — M65.321 TRIGGER FINGER, RIGHT INDEX FINGER: Primary | ICD-10-CM

## 2020-01-09 PROCEDURE — 99213 OFFICE O/P EST LOW 20 MIN: CPT | Performed by: ORTHOPAEDIC SURGERY

## 2020-01-09 PROCEDURE — 20550 NJX 1 TENDON SHEATH/LIGAMENT: CPT | Performed by: ORTHOPAEDIC SURGERY

## 2020-01-09 RX ORDER — TRIAMCINOLONE ACETONIDE 40 MG/ML
20 INJECTION, SUSPENSION INTRA-ARTICULAR; INTRAMUSCULAR
Status: DISCONTINUED | OUTPATIENT
Start: 2020-01-09 | End: 2020-08-06

## 2020-01-09 RX ADMIN — TRIAMCINOLONE ACETONIDE 20 MG: 40 INJECTION, SUSPENSION INTRA-ARTICULAR; INTRAMUSCULAR at 15:06

## 2020-01-09 ASSESSMENT — MIFFLIN-ST. JEOR: SCORE: 2121.59

## 2020-01-09 NOTE — LETTER
"    1/9/2020         RE: Salvatore Kim  [address]  [place] [address]        Dear Colleague,    Thank you for referring your patient, Salvatore Kim, to the [facility]. Please see a copy of my visit note below.    SUBJECTIVE:   Salvatore Kim is here for follow up of right index trigger finger. He has been taking anti-inflammatories which has not helped. He complains of some swelling in his right index finger.     Review of Systems:  Constitutional/General: Negative for fever, chills, change in weight  Integumentary/Skin: Negative for worrisome rashes, moles, or lesions  Neuro: Negative for weakness, dizziness, or paresthesias   Psychiatric: negative for changes in mood or affect    This document serves as a record of the services and decisions personally performed and made by DELLA Lipscomb MD. It was created on his behalf by Alexa Davenport, a trained medical scribe. The creation of this document is based the provider's statements to the medical scribe.    Scribe Alexa Davenport 2:17 PM 1/9/2020       OBJECTIVE:  Physical Exam:  /77 (BP Location: Right arm, Patient Position: Sitting, Cuff Size: Adult Large)   Pulse 76   Ht 1.689 m (5' 6.5\")   Wt 131.1 kg (289 lb)   SpO2 96%   BMI 45.95 kg/m     General Appearance: healthy, alert and no distress   Skin: no suspicious lesions or rashes  Neuro: Normal strength and tone, mentation intact and speech normal  Vascular: good pulses, and capillary refill   Lymph: no lymphadenopathy   Psych:  mentation appears normal and affect normal/bright  Resp: no increased work of breathing    Right Hand Exam:  Some swelling of the index finger.  Demonstrates triggering of the right index finger.  Tenderness: over the index finger A1 pulley.     ASSESSMENT:   Right index trigger finger    PLAN:   He would like a steroid injection today, hoping for some relief.     Procedure Note:   Informed consent obtained. Risks, benefits and complications of the " injection were discussed with the patient and the patient elected to proceed. A Right index trigger finger/flexor tenosynovial, A1 pulley- area steroid injection was performed using 0.5 cc  Kenalog-40 40mg per mL after sterile prep. This was tolerated well by the patient.     Return to clinic: AS NEEDED     The information in this document, created by a scribe for me, accurately reflects the services I personally performed and the decisions made by me. I have reviewed and approved this document for accuracy.     DELLA Lipscomb MD  Dept. Orthopedic Surgery  [facility]         Again, thank you for allowing me to participate in the care of your patient.        Sincerely,        Sabino Lipscomb MD

## 2020-01-09 NOTE — PROGRESS NOTES
Hand / Upper Extremity Injection/Arthrocentesis: R index A1  Date/Time: 1/9/2020 3:06 PM  Performed by: Sabino Lipscomb MD  Authorized by: Sabino Lipscomb MD     Indications:  Pain  Needle Size:  25 G  Approach:  Volar  Condition: trigger finger    Location:  Index finger    Site:  R index A1  Medications:  20 mg triamcinolone 40 MG/ML  Outcome:  Tolerated well, no immediate complications  Procedure discussed: discussed risks, benefits, and alternatives    Consent Given by:  Patient  Timeout: timeout called immediately prior to procedure    Prep: patient was prepped and draped in usual sterile fashion

## 2020-01-09 NOTE — PROGRESS NOTES
"SUBJECTIVE:   Salvatore Kim is here for follow up of right index trigger finger. He has been taking anti-inflammatories which has not helped. He complains of some swelling in his right index finger.     Review of Systems:  Constitutional/General: Negative for fever, chills, change in weight  Integumentary/Skin: Negative for worrisome rashes, moles, or lesions  Neuro: Negative for weakness, dizziness, or paresthesias   Psychiatric: negative for changes in mood or affect    This document serves as a record of the services and decisions personally performed and made by DELLA Lipscomb MD. It was created on his behalf by Alexa Davenport, a trained medical scribe. The creation of this document is based the provider's statements to the medical scribe.    Scribe Alexa Davenport 2:17 PM 1/9/2020       OBJECTIVE:  Physical Exam:  /77 (BP Location: Right arm, Patient Position: Sitting, Cuff Size: Adult Large)   Pulse 76   Ht 1.689 m (5' 6.5\")   Wt 131.1 kg (289 lb)   SpO2 96%   BMI 45.95 kg/m    General Appearance: healthy, alert and no distress   Skin: no suspicious lesions or rashes  Neuro: Normal strength and tone, mentation intact and speech normal  Vascular: good pulses, and capillary refill   Lymph: no lymphadenopathy   Psych:  mentation appears normal and affect normal/bright  Resp: no increased work of breathing    Right Hand Exam:  Some swelling of the index finger.  Demonstrates triggering of the right index finger.  Tenderness: over the index finger A1 pulley.     ASSESSMENT:   Right index trigger finger    PLAN:   He would like a steroid injection today, hoping for some relief.     Procedure Note:   Informed consent obtained. Risks, benefits and complications of the injection were discussed with the patient and the patient elected to proceed. A Right index trigger finger/flexor tenosynovial, A1 pulley- area steroid injection was performed using 0.5 cc  Kenalog-40 40mg per mL after sterile prep. " This was tolerated well by the patient.     Return to clinic: AS NEEDED     The information in this document, created by a scribe for me, accurately reflects the services I personally performed and the decisions made by me. I have reviewed and approved this document for accuracy.     DELLA Lipscomb MD  Dept. Orthopedic Surgery  [facility]

## 2020-01-14 ENCOUNTER — TRANSFERRED RECORDS (OUTPATIENT)
Dept: HEALTH INFORMATION MANAGEMENT | Facility: CLINIC | Age: 50
End: 2020-01-14

## 2020-01-31 ENCOUNTER — TRANSFERRED RECORDS (OUTPATIENT)
Dept: HEALTH INFORMATION MANAGEMENT | Facility: CLINIC | Age: 50
End: 2020-01-31

## 2020-02-18 ENCOUNTER — TRANSFERRED RECORDS (OUTPATIENT)
Dept: HEALTH INFORMATION MANAGEMENT | Facility: CLINIC | Age: 50
End: 2020-02-18

## 2020-07-06 ENCOUNTER — PREP FOR PROCEDURE (OUTPATIENT)
Dept: ORTHOPEDICS | Facility: CLINIC | Age: 50
End: 2020-07-06

## 2020-07-06 DIAGNOSIS — M65.30 TRIGGER FINGER, ACQUIRED: Primary | ICD-10-CM

## 2020-07-06 NOTE — PROGRESS NOTES
Patient states previous right index digit with recurrent catching/ has to manually extend. Work as a  involving repetitive use of hand.  He understands procedure as outlined with Dr Lipscomb at previous clinic visit. He notes shoulder lipoma being evaluated on 7/10 with request to try and coordinate both procedures if shoulder surgery indicated. P: orders placed regarding right index trigger finger release. All questions answered.  He will discuss timing of procedure based on pendinng surgery treatment recommendation.  Roldan Tillman OPA

## 2020-07-07 ENCOUNTER — TELEPHONE (OUTPATIENT)
Dept: ORTHOPEDICS | Facility: CLINIC | Age: 50
End: 2020-07-07

## 2020-07-07 NOTE — TELEPHONE ENCOUNTER
Spoke with patient. He is wanting to coordinate surgery with Dr Vega with in the same week for surgery. Patient will discuss with Dr Vega in 2 days at [facility].

## 2020-07-10 ENCOUNTER — OFFICE VISIT (OUTPATIENT)
Dept: SURGERY | Facility: CLINIC | Age: 50
End: 2020-07-10
Payer: COMMERCIAL

## 2020-07-10 VITALS
SYSTOLIC BLOOD PRESSURE: 131 MMHG | HEART RATE: 61 BPM | WEIGHT: 300 LBS | BODY MASS INDEX: 48.21 KG/M2 | DIASTOLIC BLOOD PRESSURE: 88 MMHG | HEIGHT: 66 IN

## 2020-07-10 DIAGNOSIS — R22.2 MASS ON BACK: Primary | ICD-10-CM

## 2020-07-10 PROCEDURE — 99203 OFFICE O/P NEW LOW 30 MIN: CPT | Performed by: SURGERY

## 2020-07-10 ASSESSMENT — MIFFLIN-ST. JEOR: SCORE: 2158.54

## 2020-07-10 NOTE — PATIENT INSTRUCTIONS
On the midline more to the right side is a about 8 by 8 cm slightly mobile mass that is probably a lipoma or brown fat and is probably deep to the fascia.   Assessment: On the midline more to the right side is a about 8 by 8 cm slightly mobile mass that is probably a lipoma or brown fat and is probably deep to the fascia.    Plan to do patient has a trigger finger that is bothering him more so he will do this first.  Then will do the lump removal in the OR with  General anesthesia  And him in the prone position.     Risks of surgery include damage to nerves, bleeding, infection, damage to  Vessels, recurrence. And risk of  General anesthesia       SKIN AND SUBCUTANEOUS SURGERY DISCHARGE INSTRUCTIONS  DR. DOROTA ESCOBAR    1. You may resume your regular diet when you feel you are ready to. DO NOT drink alcoholic beverages for 24 hours or while you are taking prescription medication.    2. Limit your activities for the first 48 hours. Gradually, increase them as tolerated. You may use stairs. I encourage you to walk as tolerated.     3. You will have some discomfort at the incision sites. This is expected. This should improve over the next 2-3 days. Ice and pain medication will help with this pain. Use prescribed pain medication as instructed.    4. Bruising and mild swelling is normal after surgery. The area below and around the incision(s) will be hard and elevated. This is normal. I call it the healing ridge. This will resolve slowly over the next several months. If you feel the pain is increasing and cannot explain it by increasing activity please call us at [phone].    5. The dressing will often have some blood on it. You may shower 24 hours after surgery. Clean gently over incision site. If clear plastic covering or steri-strip comes off and there is still some bleeding or drainage then cover with gauze or band-aid. If no bleeding, there is no reason to cover site. If you were given an abdominal binder  at time of surgery it may be removed after 24 hours after surgery. You may continue to wear it however for comfort. I suggest  you wear an old t-shirt under the abdominal binder for a more comfortable wear.    6. Avoid Aspirin for the first 72 hours after the procedure. This medication may increase the tendency to bleed.    7. Use the following medications (in addition to your normal meds) as shown:  a. Percocet 5 mg 1-2 every 6 hours as needed for severe pain. This contains 325 mg of Tylenol (acetaminophen) per tablet.  Please do not take more than 4 grams of Tylenol (acetaminophen) per day. For example, you may take 1 Percocet and 1 Tylenol, or 2 Percocet and no Tylenol, or 2 Tylenol and no Percocet every 6 hours.  b. Tylenol (acetaminophen) 500 mg every 6 hours as needed for mild pain. Do not take more than 1000 mg every 6 hours. (see above).  c. Motrin (ibuprofen) 200-800 mg every 6 hours as needed for mild to moderate pain. Take with food.     8. Notify Dr. Vega's clinic at [phone] if:    Your discomfort is not relieved by your pain medication.    You have signs of infection such as temperature above 100.5 degrees orally, chills, or increasing daily discomfort.    Incision site is becoming more red and/or there is purulent drainage.    You have questions or concerns.    9. Please call [phone] to schedule a follow up appointment in about 2 weeks.  If you have not already been given one.     10. When taking narcotics (pain medication more than Tylenol [acetaminophen] and Motrin [ibuprofen]) it is important to keep your stools soft to avoid constipation and pain with straining. This is best done by drinking fluids (non-alcoholic and non-caffeinated) and taking a stool softener (i.e. Metamucil or milk of magnesia). You may be able to use non-narcotics for pain relief especially by the 3rd post- operative day. Tylenol (acetaminophen) 500 mg every 6 hours and/or Motrin (ibuprofen) 200-800 mg every  6 hours. Please do not take more than 4 grams of Tylenol (acetaminophen) per day. Remember your Percocet does have Tylenol (acetaminophen) already in it. Please take Motrin (ibuprofen) with food to help protect the stomach. If you have a history of stomach ulcers or stomach problems, do not take Motrin (ibuprofen).     11. Do not drive or operate heavy machinery for 24 hours after surgery or when taking narcotics. You may resume driving when feel that you can safely avoid an accident and are not taking narcotics. This is usually 5 to 7 days after surgery. You should not be alone for 24 hours after surgery.    12. Have milk of magnesia available at home so that when you take the pain medications you take 1-2 teaspoons a day,  to help reduce problems with constipation.

## 2020-07-10 NOTE — PROGRESS NOTES
"Dear Nghia Chan  I was asked to see this patient by Nghia Momin for please see below.  I have seen Salvatore Kim and as you know his chief complaint is lump on his upper back almost to the neck.  Started lower.  Has been growing.   Had right shoulder surgery a few years ago.     HPI:  Patient is a 50 year old male  with complaints see above  The patient noticed the symptoms about 2 years ago.    Patient has not family history of thisproblems  nothing makes the episode better.  Stopped smoking 3 years ago.     Review Of Systems  Respiratory: No shortness of breath, dyspnea on exertion, cough, or hemoptysis  Cardiovascular: negative  Gastrointestinal: negative  Endocrine: negative  :  negative    10 Point review of systems all others are negative.   /88   Pulse 61   Ht 1.676 m (5' 6\")   Wt 136.1 kg (300 lb)   BMI 48.42 kg/m      Past Medical History:   Diagnosis Date     Allergies      Arthritis      Depression, anxiety      ED (erectile dysfunction)      Hyperlipidemia      Metabolic syndrome      Trigger finger, right        Past Surgical History:   Procedure Laterality Date     COLONOSCOPY  6/7/2013    Procedure: COLONOSCOPY;  COLONOSCOPY, EXAM UNDER ANESTHESIA,  FISTULOTOMY;  Surgeon: Maxwell Israel MD;  Location: [facility]     EXAM UNDER ANESTHESIA ANUS  6/7/2013    Procedure: EXAM UNDER ANESTHESIA ANUS;;  Surgeon: Maxwell Israel MD;  Location: [facility]     FISTULOTOMY RECTUM  6/7/2013    Procedure: FISTULOTOMY RECTUM;;  Surgeon: Maxwell Israel MD;  Location: [facility]     HEMORRHOID SURGERY       RELEASE CARPAL TUNNEL  2001    Rt     ROTATOR CUFF REPAIR RT/LT Right 2017       Social History     Socioeconomic History     Marital status:      Spouse name: Not on file     Number of children: 4     Years of education: Not on file     Highest education level: Not on file   Occupational History     Occupation: [employer]     Employer: [employer]   Social Needs     Financial " resource strain: Not on file     Food insecurity     Worry: Not on file     Inability: Not on file     Transportation needs     Medical: Not on file     Non-medical: Not on file   Tobacco Use     Smoking status: Former Smoker     Packs/day: 1.50     Years: 25.00     Pack years: 37.50     Types: Cigarettes     Smokeless tobacco: Never Used     Tobacco comment: quit 3 weeks ago.   Substance and Sexual Activity     Alcohol use: Yes     Alcohol/week: 3.3 standard drinks     Types: 4 Standard drinks or equivalent per week     Comment: RARE, socially      Drug use: Yes     Types: Marijuana     Sexual activity: Yes     Partners: Female   Lifestyle     Physical activity     Days per week: Not on file     Minutes per session: Not on file     Stress: Not on file   Relationships     Social connections     Talks on phone: Not on file     Gets together: Not on file     Attends [employer]ical service: Not on file     Active member of club or organization: Not on file     Attends meetings of clubs or organizations: Not on file     Relationship status: Not on file     Intimate partner violence     Fear of current or ex partner: Not on file     Emotionally abused: Not on file     Physically abused: Not on file     Forced sexual activity: Not on file   Other Topics Concern     Parent/sibling w/ CABG, MI or angioplasty before 65F 55M? Yes     Comment: father with MI    Social History Narrative     Not on file       Current Outpatient Medications   Medication Sig Dispense Refill     EPINEPHrine (EPIPEN/ADRENACLICK/OR ANY BX GENERIC EQUIV) 0.3 MG/0.3ML injection 2-pack Inject 0.3 mLs (0.3 mg) into the muscle once as needed for anaphylaxis 2 mL 0     ibuprofen (ADVIL/MOTRIN) 600 MG tablet Take 1 tablet (600 mg) by mouth every 6 hours as needed for moderate pain 30 tablet 1     sildenafil (VIAGRA) 100 MG tablet Take 1 tablet (100 mg) by mouth daily as needed (ED) 10 tablet 3       Above was reviewed  Physical exam: /88   Pulse 61   Ht  "1.676 m (5' 6\")   Wt 136.1 kg (300 lb)   BMI 48.42 kg/m     Patient able to get up on table without difficulty.   Patient is alert and orientated.   Head eyes, nose and mouth within normal limits.  No supraclavicular or cervical adenopathy palpated.  Thyroid within normal limits.  No carotid bruits auscultated.  Lungs are clear to auscultation  Heart is regular rate and rhythm with no murmur or thrills noted.  No costal vertebral angle tenderness noted.  Abdomen is abdomen is soft without significant tenderness, masses, organomegaly or guarding  bowel sounds are positive and no caput medusa noted.      Skin was warm and pink  Normal Affect      Lower extremity edema is not present. Now but patient feels that at end of day is more swollen.     On the midline more to the right side is a about 8 by 8 cm slightly mobile mass that is probably a lipoma or brown fat and is probably deep to the fascia.   Assessment: On the midline more to the right side is a about 8 by 8 cm slightly mobile mass that is probably a lipoma or brown fat and is probably deep to the fascia.    Plan to do patient has a trigger finger that is bothering him more so he will do this first.  Then will do the lump removal in the OR with  General anesthesia  And him in the prone position.     Risks of surgery include damage to nerves, bleeding, infection, damage to  Vessels, recurrence. And risk of  General anesthesia     Frank Vega MD    "

## 2020-07-10 NOTE — LETTER
"    7/10/2020         RE: Salvatore Kim  [address]  [place] [address]        Dear Colleague,    Thank you for referring your patient, Salvatore Kim, to the [facility]. Please see a copy of my visit note below.    Dear Nghia Chan  I was asked to see this patient by Nghia Momin for please see below.  I have seen Salvatore Kim and as you know his chief complaint is lump on his upper back almost to the neck.  Started lower.  Has been growing.   Had right shoulder surgery a few years ago.     HPI:  Patient is a 50 year old male  with complaints see above  The patient noticed the symptoms about 2 years ago.    Patient has not family history of thisproblems  nothing makes the episode better.  Stopped smoking 3 years ago.     Review Of Systems  Respiratory: No shortness of breath, dyspnea on exertion, cough, or hemoptysis  Cardiovascular: negative  Gastrointestinal: negative  Endocrine: negative  :  negative    10 Point review of systems all others are negative.   /88   Pulse 61   Ht 1.676 m (5' 6\")   Wt 136.1 kg (300 lb)   BMI 48.42 kg/m      Past Medical History:   Diagnosis Date     Allergies      Arthritis      Depression, anxiety      ED (erectile dysfunction)      Hyperlipidemia      Metabolic syndrome      Trigger finger, right        Past Surgical History:   Procedure Laterality Date     COLONOSCOPY  6/7/2013    Procedure: COLONOSCOPY;  COLONOSCOPY, EXAM UNDER ANESTHESIA,  FISTULOTOMY;  Surgeon: Maxwell Israel MD;  Location: [facility]     EXAM UNDER ANESTHESIA ANUS  6/7/2013    Procedure: EXAM UNDER ANESTHESIA ANUS;;  Surgeon: Maxwell Israel MD;  Location: [facility]     FISTULOTOMY RECTUM  6/7/2013    Procedure: FISTULOTOMY RECTUM;;  Surgeon: Maxwell Israel MD;  Location: [facility]     HEMORRHOID SURGERY       RELEASE CARPAL TUNNEL  2001    Rt     ROTATOR CUFF REPAIR RT/LT Right 2017       Social History     Socioeconomic History     Marital status:  "     Spouse name: Not on file     Number of children: 4     Years of education: Not on file     Highest education level: Not on file   Occupational History     Occupation:      Employer:    Social Needs     Financial resource strain: Not on file     Food insecurity     Worry: Not on file     Inability: Not on file     Transportation needs     Medical: Not on file     Non-medical: Not on file   Tobacco Use     Smoking status: Former Smoker     Packs/day: 1.50     Years: 25.00     Pack years: 37.50     Types: Cigarettes     Smokeless tobacco: Never Used     Tobacco comment: quit 3 weeks ago.   Substance and Sexual Activity     Alcohol use: Yes     Alcohol/week: 3.3 standard drinks     Types: 4 Standard drinks or equivalent per week     Comment: RARE, socially      Drug use: Yes     Types: Marijuana     Sexual activity: Yes     Partners: Female   Lifestyle     Physical activity     Days per week: Not on file     Minutes per session: Not on file     Stress: Not on file   Relationships     Social connections     Talks on phone: Not on file     Gets together: Not on file     Attends [employer] service: Not on file     Active member of club or organization: Not on file     Attends meetings of clubs or organizations: Not on file     Relationship status: Not on file     Intimate partner violence     Fear of current or ex partner: Not on file     Emotionally abused: Not on file     Physically abused: Not on file     Forced sexual activity: Not on file   Other Topics Concern     Parent/sibling w/ CABG, MI or angioplasty before 65F 55M? Yes     Comment: father with MI    Social History Narrative     Not on file       Current Outpatient Medications   Medication Sig Dispense Refill     EPINEPHrine (EPIPEN/ADRENACLICK/OR ANY BX GENERIC EQUIV) 0.3 MG/0.3ML injection 2-pack Inject 0.3 mLs (0.3 mg) into the muscle once as needed for anaphylaxis 2 mL 0     ibuprofen (ADVIL/MOTRIN) 600 MG tablet Take 1 tablet (600  "mg) by mouth every 6 hours as needed for moderate pain 30 tablet 1     sildenafil (VIAGRA) 100 MG tablet Take 1 tablet (100 mg) by mouth daily as needed (ED) 10 tablet 3       Above was reviewed  Physical exam: /88   Pulse 61   Ht 1.676 m (5' 6\")   Wt 136.1 kg (300 lb)   BMI 48.42 kg/m     Patient able to get up on table without difficulty.   Patient is alert and orientated.   Head eyes, nose and mouth within normal limits.  No supraclavicular or cervical adenopathy palpated.  Thyroid within normal limits.  No carotid bruits auscultated.  Lungs are clear to auscultation  Heart is regular rate and rhythm with no murmur or thrills noted.  No costal vertebral angle tenderness noted.  Abdomen is abdomen is soft without significant tenderness, masses, organomegaly or guarding  bowel sounds are positive and no caput medusa noted.      Skin was warm and pink  Normal Affect      Lower extremity edema is not present. Now but patient feels that at end of day is more swollen.     On the midline more to the right side is a about 8 by 8 cm slightly mobile mass that is probably a lipoma or brown fat and is probably deep to the fascia.   Assessment: On the midline more to the right side is a about 8 by 8 cm slightly mobile mass that is probably a lipoma or brown fat and is probably deep to the fascia.    Plan to do patient has a trigger finger that is bothering him more so he will do this first.  Then will do the lump removal in the OR with  General anesthesia  And him in the prone position.     Risks of surgery include damage to nerves, bleeding, infection, damage to  Vessels, recurrence. And risk of  General anesthesia     Frank Vega MD      Again, thank you for allowing me to participate in the care of your patient.        Sincerely,        Frank Vega MD    "

## 2020-07-13 ENCOUNTER — TELEPHONE (OUTPATIENT)
Dept: SURGERY | Facility: CLINIC | Age: 50
End: 2020-07-13

## 2020-07-13 NOTE — TELEPHONE ENCOUNTER
This will be a joint surgery with Dr. Vega    Type of surgery: Release right index trigger finger CPT code 58523  Trigger finger, acquired [M65.30]  - Primary   Location of surgery: [facility]  Date and time of surgery: 08/17/2020 / 7:00 am   Surgeon: Ruel  Pre-Op Appt Date: 08/05/2020  Post-Op Appt Date: 09/03/2020   Packet sent out: Yes  Pre-cert/Authorization completed:  No prior auth required per [employer].   This is NOT related to WC. Was asked by the .   Date: 07/13/2020

## 2020-07-13 NOTE — TELEPHONE ENCOUNTER
This is a joint surgery with Dr. Lipscomb    Type of surgery: Removal of large lipoma on the right upper back and neck CPT code 07623  Mass on back [R22.2]  - Primary   Location of surgery: [facility]  Date and time of surgery: 08/17/2020 / 7:00 am  Surgeon: Gary  Pre-Op Appt Date: 08/05/2020  Post-Op Appt Date: 09/01/2020   Packet sent out: Yes  Pre-cert/Authorization completed:  No prior auth required per  list on site.   Date: 07/14/2020    Sent to  and was received. 07/17/2020

## 2020-07-14 ENCOUNTER — TELEPHONE (OUTPATIENT)
Dept: ORTHOPEDICS | Facility: CLINIC | Age: 50
End: 2020-07-14

## 2020-07-14 NOTE — TELEPHONE ENCOUNTER
Patient calling, he has surgery coming up for trigger finger, but it is a month away and he is currently unable to use his finger, wondering if he can come in to get a cortisone injection.

## 2020-07-14 NOTE — TELEPHONE ENCOUNTER
Called and spoke with patient who would like steroid injection in finger in meantime prior to scheduled surgery on 8/17/20. Last injection was on 1/9/20. Ortho team, is it okay for the patient to come in for an injection?    Rufino Tariq RN....7/14/2020 10:09 AM

## 2020-07-15 NOTE — TELEPHONE ENCOUNTER
Called pt and informed Dr Lipscomb states there is no need for injection prior to surgery.  April St Asim CMA CMA 7/15/2020 1:12 PM

## 2020-08-05 ENCOUNTER — OFFICE VISIT (OUTPATIENT)
Dept: FAMILY MEDICINE | Facility: CLINIC | Age: 50
End: 2020-08-05
Payer: COMMERCIAL

## 2020-08-05 VITALS
OXYGEN SATURATION: 97 % | HEIGHT: 66 IN | TEMPERATURE: 97.8 F | RESPIRATION RATE: 18 BRPM | HEART RATE: 62 BPM | DIASTOLIC BLOOD PRESSURE: 86 MMHG | BODY MASS INDEX: 47.57 KG/M2 | SYSTOLIC BLOOD PRESSURE: 132 MMHG | WEIGHT: 296 LBS

## 2020-08-05 DIAGNOSIS — L02.92 BOIL: ICD-10-CM

## 2020-08-05 DIAGNOSIS — Z01.818 PREOP GENERAL PHYSICAL EXAM: Primary | ICD-10-CM

## 2020-08-05 DIAGNOSIS — M65.30 TRIGGER FINGER, ACQUIRED: ICD-10-CM

## 2020-08-05 DIAGNOSIS — D17.30 LIPOMA OF SKIN AND SUBCUTANEOUS TISSUE: ICD-10-CM

## 2020-08-05 LAB — HGB BLD-MCNC: 13.9 G/DL (ref 13.3–17.7)

## 2020-08-05 PROCEDURE — 36415 COLL VENOUS BLD VENIPUNCTURE: CPT | Performed by: PHYSICIAN ASSISTANT

## 2020-08-05 PROCEDURE — 99214 OFFICE O/P EST MOD 30 MIN: CPT | Performed by: PHYSICIAN ASSISTANT

## 2020-08-05 PROCEDURE — 93000 ELECTROCARDIOGRAM COMPLETE: CPT | Performed by: PHYSICIAN ASSISTANT

## 2020-08-05 PROCEDURE — 85018 HEMOGLOBIN: CPT | Performed by: PHYSICIAN ASSISTANT

## 2020-08-05 RX ORDER — AZITHROMYCIN 250 MG/1
TABLET, FILM COATED ORAL
Qty: 6 TABLET | Refills: 0 | Status: SHIPPED | OUTPATIENT
Start: 2020-08-05 | End: 2020-08-10

## 2020-08-05 ASSESSMENT — MIFFLIN-ST. JEOR: SCORE: 2140.4

## 2020-08-05 NOTE — PROGRESS NOTES
[facility]  [address] [facility] [address]  [phone]  Dept: [phone]    PRE-OP EVALUATION:  Today's date: 2020    Salvatore Kim (: 1970) presents for pre-operative evaluation assessment as requested by Dr. Anthony.  He requires evaluation and anesthesia risk assessment prior to undergoing surgery/procedure for treatment of trigger finger and lipoma removal .    Proposed Surgery/ Procedure: trigger finger and lipoma removal   Date of Surgery/ Procedure: 20  Time of Surgery/ Procedure:   Hospital/Surgical Facility: [facility]  Surgery Fax Number: [phone]  Primary Physician: Nghia Momin  Type of Anesthesia Anticipated: General    Preoperative Questionnaire:   No - Have you ever had a heart attack or stroke?  No - Have you ever had surgery on your heart or blood vessels, such as a stent, coronary (heart) bypass, or surgery on an artery in the head, neck, heart, or legs?  No - Do you have chest pain when you are physically active?  No - Do you have a history of heart failure?  No - Do you currently have a cold, bronchitis, or symptoms of other respiratory (head and chest) infections?  No - Do you have a cough, shortness of breath, or wheezing?  No - Do you or anyone in your family have a history of blood clots?  No - Do you or anyone in your family have a serious bleeding problem, such as long-lasting bleeding after surgeries or cuts?  No - Have you ever had anemia or been told to take iron pills?  No - Have you had any abnormal blood loss such as black, tarry or bloody stools, or abnormal vaginal bleeding?  No - Have you ever had a blood transfusion?  Yes - Are you willing to have a blood transfusion if it is medically needed before, during, or after your surgery?  No - Have you or anyone in your family ever had problems with anesthesia (sedation for surgery)?  YES - Do you have sleep apnea, excessive snoring, or daytime  drowsiness? snores  No - Do you have any artifical heart valves or other implanted medical devices, such as a pacemaker, defibrillator, or continuous glucose monitor?  No - Do you have any artifical joints?  No - Are you allergic to latex?  No - Is there any chance that you may be pregnant?    Patient has a Health Care Directive or Living Will:  NO    HPI:     HPI related to upcoming procedure: right trigger finger for over 6 months, pain and locking with range of motion. Also lipoma on upper back for couple years. Slowly grown.       See problem list for active medical problems.  Problems all longstanding and stable, except as noted/documented.  See ROS for pertinent symptoms related to these conditions.    The last couple days he noticed pain and swelling in his right groin.  He states he had like a zit and he squeezed and got clearish bloody material.  He states is pretty sore.  Denies any fevers..  He states he has had this before.  MEDICAL HISTORY:     Patient Active Problem List    Diagnosis Date Noted     Trigger finger, acquired 08/05/2020     Priority: Medium     Lipoma of skin and subcutaneous tissue 08/05/2020     Priority: Medium     Myalgia 12/11/2019     Priority: Medium     BMI 45.0-49.9, adult (H) 01/07/2016     Priority: Medium     Esophageal reflux (GERD) 05/15/2014     Priority: Medium     Vitamin deficiency 12/20/2013     Priority: Medium     Problem list name updated by automated process. Provider to review       CARDIOVASCULAR SCREENING; LDL GOAL LESS THAN 130 12/18/2013     Priority: Medium     HNP (herniated nucleus pulposus), lumbar 06/05/2013     Priority: Medium     Anal fissure 06/05/2013     Priority: Medium     Allergic reaction 07/06/2012     Priority: Medium     Impotence 06/06/2012     Priority: Medium     Tobacco abuse 05/29/2012     Priority: Medium     Presbyopia OU 05/29/2012     Priority: Medium     Hypertriglyceridemia 05/29/2012     Priority: Medium      Past Medical History:    Diagnosis Date     Allergies      Arthritis      Depression, anxiety      ED (erectile dysfunction)      Hyperlipidemia      Metabolic syndrome      Trigger finger, right      Past Surgical History:   Procedure Laterality Date     COLONOSCOPY  6/7/2013    Procedure: COLONOSCOPY;  COLONOSCOPY, EXAM UNDER ANESTHESIA,  FISTULOTOMY;  Surgeon: Maxwell Israel MD;  Location: [facility]     EXAM UNDER ANESTHESIA ANUS  6/7/2013    Procedure: EXAM UNDER ANESTHESIA ANUS;;  Surgeon: Maxwell Israel MD;  Location: [facility]     FISTULOTOMY RECTUM  6/7/2013    Procedure: FISTULOTOMY RECTUM;;  Surgeon: Maxwell Israel MD;  Location: [facility]     HEMORRHOID SURGERY       RELEASE CARPAL TUNNEL  2001    Rt     ROTATOR CUFF REPAIR RT/LT Right 2017     Current Outpatient Medications   Medication Sig Dispense Refill     azithromycin (ZITHROMAX) 250 MG tablet Take 2 tablets (500 mg) by mouth daily for 1 day, THEN 1 tablet (250 mg) daily for 4 days. 6 tablet 0     EPINEPHrine (EPIPEN/ADRENACLICK/OR ANY BX GENERIC EQUIV) 0.3 MG/0.3ML injection 2-pack Inject 0.3 mLs (0.3 mg) into the muscle once as needed for anaphylaxis 2 mL 0     ibuprofen (ADVIL/MOTRIN) 600 MG tablet Take 1 tablet (600 mg) by mouth every 6 hours as needed for moderate pain 30 tablet 1     sildenafil (VIAGRA) 100 MG tablet Take 1 tablet (100 mg) by mouth daily as needed (ED) 10 tablet 3     OTC products: None, except as noted above    Allergies   Allergen Reactions     Bees Swelling     Chocolate       Latex Allergy: NO    Social History     Tobacco Use     Smoking status: Former Smoker     Packs/day: 1.50     Years: 25.00     Pack years: 37.50     Types: Cigarettes     Smokeless tobacco: Never Used     Tobacco comment: quit 3 weeks ago.   Substance Use Topics     Alcohol use: Yes     Alcohol/week: 3.3 standard drinks     Types: 4 Standard drinks or equivalent per week     Comment: RARE, socially      History   Drug Use     Types: Marijuana       REVIEW OF SYSTEMS:  "  CONSTITUTIONAL: NEGATIVE for fever, chills, change in weight  INTEGUMENTARY/SKIN: NEGATIVE for worrisome rashes, moles or lesions  EYES: NEGATIVE for vision changes or irritation  ENT/MOUTH: NEGATIVE for ear, mouth and throat problems  RESP: NEGATIVE for significant cough or SOB  CV: NEGATIVE for chest pain, palpitations or peripheral edema  GI: NEGATIVE for nausea, abdominal pain, heartburn, or change in bowel habits  : NEGATIVE for frequency, dysuria, or hematuria  MUSCULOSKELETAL: NEGATIVE for significant arthralgias or myalgia  NEURO: NEGATIVE for weakness, dizziness or paresthesias  ENDOCRINE: NEGATIVE for temperature intolerance, skin/hair changes  HEME: NEGATIVE for bleeding problems  PSYCHIATRIC: NEGATIVE for changes in mood or affect    EXAM:   /86   Pulse 62   Temp 97.8  F (36.6  C) (Tympanic)   Resp 18   Ht 1.676 m (5' 6\")   Wt 134.3 kg (296 lb)   SpO2 97%   BMI 47.78 kg/m      GENERAL APPEARANCE: healthy, alert and no distress     EYES: EOMI,  PERRL     HENT: ear canals and TM's normal and nose and mouth without ulcers or lesions     NECK: no adenopathy, no asymmetry, masses, or scars and thyroid normal to palpation     RESP: lungs clear to auscultation - no rales, rhonchi or wheezes     CV: regular rates and rhythm, normal S1 S2, no S3 or S4 and no murmur, click or rub     ABDOMEN:  soft, nontender, no HSM or masses and bowel sounds normal     MS: extremities normal- no gross deformities noted, no evidence of inflammation in joints, FROM in all extremities.     SKIN: no suspicious lesions or rashes-in the right groin region he has a 1 cm point of type lesion.  Some slight erythema and tenderness.     NEURO: Normal strength and tone, sensory exam grossly normal, mentation intact and speech normal     PSYCH: mentation appears normal. and affect normal/bright     LYMPHATICS: No cervical adenopathy    DIAGNOSTICS:     EKG: appears normal, NSR, normal axis, normal intervals, no acute ST/T " changes c/w ischemia, no LVH by voltage criteria, unchanged from previous tracings  Labs Drawn and in Process:   Unresulted Labs Ordered in the Past 30 Days of this Admission     No orders found for last 31 day(s).          Recent Labs   Lab Test 01/21/19  1658 06/08/17  1536 01/09/16  1040 05/07/14  0752 12/18/13  1539 06/05/13  1530   HGB  --  14.8  --   --  15.0 14.6   PLT  --   --   --   --  220 225   NA  --   --  141  --  140 141   POTASSIUM  --   --  4.1  --  3.9 4.3   CR  --   --  0.84  --  0.77 0.86   A1C 5.5  --   --  5.8  --   --         IMPRESSION:   Reason for surgery/procedure: trigger finger and lipoma removal.     The proposed surgical procedure is considered INTERMEDIATE risk.    REVISED CARDIAC RISK INDEX  The patient has the following serious cardiovascular risks for perioperative complications such as (MI, PE, VFib and 3  AV Block):  No serious cardiac risks  INTERPRETATION: 0 risks: Class I (very low risk - 0.4% complication rate)    The patient has the following additional risks for perioperative complications:  No identified additional risks      ICD-10-CM    1. Preop general physical exam  Z01.818 EKG 12-lead complete w/read - Clinics     Hemoglobin   2. Trigger finger, acquired  M65.30    3. Lipoma of skin and subcutaneous tissue  D17.30    4. BMI 45.0-49.9, adult (H)  Z68.42    5. Boil  L02.92 azithromycin (ZITHROMAX) 250 MG tablet       RECOMMENDATIONS:     --Patient is on no chronic medications    APPROVAL GIVEN to proceed with proposed procedure, without further diagnostic evaluation  He was started with a Z-Bakari for his boil.  Warning signs and side effects were discussed.  He should follow-up before the surgery if he is not improving.    Signed Electronically by: Nghia Momin PA-C  Discussed this patient with  Nghia Momin and agree with above assessment and plan. The patient is an acceptable candidate for the proposed anasthesia.  Cameron Comer MD      Copy of this  evaluation report is provided to requesting physician.    [place] Preop Guidelines    Revised Cardiac Risk Index

## 2020-08-14 ENCOUNTER — TELEPHONE (OUTPATIENT)
Dept: SURGERY | Facility: CLINIC | Age: 50
End: 2020-08-14

## 2020-08-14 DIAGNOSIS — R22.2 MASS ON BACK: ICD-10-CM

## 2020-08-14 PROCEDURE — U0003 INFECTIOUS AGENT DETECTION BY NUCLEIC ACID (DNA OR RNA); SEVERE ACUTE RESPIRATORY SYNDROME CORONAVIRUS 2 (SARS-COV-2) (CORONAVIRUS DISEASE [COVID-19]), AMPLIFIED PROBE TECHNIQUE, MAKING USE OF HIGH THROUGHPUT TECHNOLOGIES AS DESCRIBED BY CMS-2020-01-R: HCPCS | Performed by: SURGERY

## 2020-08-14 NOTE — TELEPHONE ENCOUNTER
Patient had a infected boil on his leg and was given Zpack. Infection is gone but he just wanted to make sure he is good to go with surgery Monday. Chrissy Silva Cma

## 2020-08-14 NOTE — TELEPHONE ENCOUNTER
Reason for call:  Other   Patient called regarding (reason for call): call back  Additional comments:  He wants to discuss if ok for surgery since he had a cyst and was taking an antibiotic last week. Please call.     Phone number to reach patient:  Home number on file [phone] (home)    Best Time:   Any     Can we leave a detailed message on this number?  YES    Travel screening: Not Applicable

## 2020-08-16 LAB
SARS-COV-2 RNA SPEC QL NAA+PROBE: NOT DETECTED
SPECIMEN SOURCE: NORMAL

## 2020-08-17 ENCOUNTER — TELEPHONE (OUTPATIENT)
Dept: SURGERY | Facility: CLINIC | Age: 50
End: 2020-08-17

## 2020-08-17 NOTE — TELEPHONE ENCOUNTER
Reason for Call:  Other call back    Detailed comments: Pharmacy received a script for percocet. Pharmacy would like to know if the can have script state max of 6 per day. Please call pharmacy to advise.    Phone Number Patient can be reached at: Other phone number:  na    Best Time: na    Can we leave a detailed message on this number? Not Applicable    Call taken on 8/17/2020 at 12:56 PM by Cecelia Calderon

## 2020-08-17 NOTE — TELEPHONE ENCOUNTER
Returned call to pharmacy and left vm letting know to add max of 6 per day to label.    Emma Louis RN Specialty Triage 8/17/2020 3:20 PM

## 2020-08-20 ENCOUNTER — TELEPHONE (OUTPATIENT)
Dept: SURGERY | Facility: CLINIC | Age: 50
End: 2020-08-20

## 2020-08-20 NOTE — TELEPHONE ENCOUNTER
"Reason for call:  Symptom   Symptom or request: incision edema    Duration (how long have symptoms been present): since tuesday  Have you been treated for this before? No    Additional comments: Patient states there is a \"water balloon\" above incision. Please call.   Phone number to reach patient:  Home number on file [phone] (home)    Best Time:  any    Can we leave a detailed message on this number?  YES    Travel screening: Not Applicable    "

## 2020-08-20 NOTE — TELEPHONE ENCOUNTER
Returned call to pt he has c/o of swelling at his incision site. Feels a little hard in some spots, but soft at the same time. denies any issues with incision itself. Absent of redness, warmth, drainage, odor. I let him know this could either be free fluid or if I becomes harder it could be a pocket of blood from a skin edge that didnt cauterized or was pulled resulting in blood building up (hematoma). I advised he use ice as the site and could try some ibuprofen. Other wise send a [employer] message with a picture and check in tomorrow for an update.    Emma Louis RN Specialty Triage 8/20/2020 1:14 PM

## 2020-08-24 ENCOUNTER — TELEPHONE (OUTPATIENT)
Dept: FAMILY MEDICINE | Facility: CLINIC | Age: 50
End: 2020-08-24

## 2020-08-24 NOTE — TELEPHONE ENCOUNTER
Called pt to check in on his status. He has an appt tomorrow with MD for a wound check. Symptoms are about the same, maybe a little worse. States mom say some green drainage so he call this morning for an appt. I let him know that its a good idea for wound checks if he thinks it should be done. I let him know again to watch for fever chills, or general note feeling well. He will keep his appt for tomorrow with md.    Emma Louis RN Specialty Triage 8/24/2020 3:47 PM

## 2020-08-25 ENCOUNTER — OFFICE VISIT (OUTPATIENT)
Dept: SURGERY | Facility: CLINIC | Age: 50
End: 2020-08-25
Payer: COMMERCIAL

## 2020-08-25 VITALS
HEIGHT: 66 IN | WEIGHT: 296 LBS | BODY MASS INDEX: 47.57 KG/M2 | DIASTOLIC BLOOD PRESSURE: 83 MMHG | HEART RATE: 76 BPM | SYSTOLIC BLOOD PRESSURE: 130 MMHG

## 2020-08-25 DIAGNOSIS — D17.9 FIBROLIPOMA: Primary | ICD-10-CM

## 2020-08-25 PROCEDURE — 99024 POSTOP FOLLOW-UP VISIT: CPT | Performed by: SURGERY

## 2020-08-25 RX ORDER — SULFAMETHOXAZOLE/TRIMETHOPRIM 800-160 MG
1 TABLET ORAL 2 TIMES DAILY
Qty: 20 TABLET | Refills: 1 | Status: SHIPPED | OUTPATIENT
Start: 2020-08-25 | End: 2024-01-17

## 2020-08-25 ASSESSMENT — MIFFLIN-ST. JEOR: SCORE: 2140.4

## 2020-08-25 NOTE — PATIENT INSTRUCTIONS
Salvatore is a 50 year old male who is status post removal of a lipoma on the neck.  with no  chills and no fever.      Patient's  Pain is  improving.  Appetite is  improving.    Wound(s)  Is/are   clean dry and intact with no evidence of infection.  But does have some fluid  In the upper aspect above his wound and some swelling in the area, but really no pain and no erythema.         Path report shows:    Final Diagnosis    Right upper back, excision-Fibrolipoma   Electronically signed by Lois Quintana MD on 8/20/2020 at 1012    Clinical Information     large growing lipoma right upper back and neck   Gross Description     Right upper back: Is a 9.8 x 9.5 x 3.4 cm aggregate of irregular fatty soft tissue fragments which are serially sectioned revealing a fatty cut surface without areas of hemorrhage or necrosis. On the outer surface there is focal adherent muscle. Representative sections are submitted in cassette A1 through A5.   (CM)         Plan:remove sutures next week.   Use antibiotic ointment on wound at night.   Will start on bactrim ds bid

## 2020-08-25 NOTE — LETTER
8/25/2020         RE: Salvatore Kim  [address] [place] [address]        Dear Colleague,    Thank you for referring your patient, Salvatore Kim, to the [facility]. Please see a copy of my visit note below.    Salvatore is a 50 year old male who is status post removal of a lipoma on the neck.  with no  chills and no fever.      Patient's  Pain is  improving.  Appetite is  improving.    Wound(s)  Is/are   clean dry and intact with no evidence of infection.  But does have some fluid  In the upper aspect above his wound and some swelling in the area, but really no pain and no erythema.         Path report shows:    Final Diagnosis    Right upper back, excision-Fibrolipoma   Electronically signed by Lois Quintana MD on 8/20/2020 at 1012    Clinical Information     large growing lipoma right upper back and neck   Gross Description     Right upper back: Is a 9.8 x 9.5 x 3.4 cm aggregate of irregular fatty soft tissue fragments which are serially sectioned revealing a fatty cut surface without areas of hemorrhage or necrosis. On the outer surface there is focal adherent muscle. Representative sections are submitted in cassette A1 through A5.   (CM)         Plan:remove sutures next week.   Use antibiotic ointment on wound at night.   Will start on bactrim ds bid      Time spent with the patient with greater that 50% of the time in discussion was 15 minutes.  Frank Fuentes MD, MD    Physician    General Surgery    OR Surgeon    Signed    Date of Service:  8/17/2020  8:17 AM             Procedure: RELEASE: TRIGGER FINGER RIGHT INDEX FINGER  Case Time: 8/17/2020  8:17 AM  Surgeon: Sabino Lipscomb MD                   []Hide copied text    []Hover for details     POST-OPERATIVE NOTE       Surgeon(s):  Frank Vega MD Rogers, John Brittan, MD     Assistant(s):  Circulating nurse: Kavita Chacon RN; Ry Lira RN  Scrub tech: Susie Silva;  Mimi Santana; Dianne Watters     Preoperative Diagnosis:  large growing lipoma right upper back and neck, trigger finger aquired right index finger     Postoperative Diagnosis:  12 by 8 by 3 cm thick hard lipoma  Attached to the subcutaneous skin and to the fascia.      Procedure(s):  Excsion of 12 by 8 by 3 cm thick hard lipoma  Attached to the subcutaneous skin and to the fascia. Excsion and undermining of the skin and multiple layer closure.      Complications:  None      EBL: 60 mL     Anesthesia:  General     Operative Findings:  As above     Specimen(s):    ID Type Source Tests Collected by Time Destination   1 : right upper back lipoma.  Tissue Lipoma (Specify Site) SURGICAL PATHOLOGY Sabino Lipscomb MD 8/17/2020 0922           Grafts and/or Implants:None     Condition on Discharge from Operating Room: Satisfactory        REPORT OF OPERATION/ PROCEDURE  Please see below operative note.     Risks explained including bleeding, infection, scar and recurrence.  And damage to nerves.   Dr. Lipscomb did his surgery first with the patient in the prone position under General anesthesia .   Then continuing with General anesthesia  The area was preped and draped ni steril manner.   After sterile prep with betadine the area was infiltrated with local.  An incision was made over the marked out lump.  I started with a small incison and eventually need e to make it cllarger to get this large scarred down lipoma out. I started with taking donw the superfical area staying close to the subcutaneous fat.  Then I divided the fat longitudinally to get down to the fascia.   I did get into the muscle layer lterally. There was no easy plane so I just removed a quarter of the lipoma at a time moslty with heavy scissors.   The most difficult part to get out was the more superior medial quarter as it seemed to be attached to the fascia more. And there was an area that seemed harder then the rest. Once I had removed the  lipoma I looked to see if there was any left and felt that I had removed it all.   Hemoastais was done with pressure and some cautery. I did try to use cautery on the lipoma but it was to thick.  After the removal I irrigated several times and did not see any more bleeding.  I then closed the skin in multiple layers with 0 vicryl.   Then a running suture with 3-0 monocryl.  The undermining of the skin was done with the removal of the lipoma.   The wound was reinforced with 0 nylon interrupted vertical mattress suture time 5, along with a pressure dressing.            Procedure  Preop dx:  12 by 8 by 3 cm thick hard lipoma  Attached to the subcutaneous skin and to the fascia.   Post op dx:  Same  Procedure:  8 cm  Incison for removal of 12 by 8 by 3 cm thick hard lipoma  Attached to the subcutaneous skin and to the fascia.  Undermining with the skin was done and then multiple layer closure.      After sterile prep with betadine the area was infiltrated with local.  An incision was made over the marked out lump.  I started with a small incison and eventually need e to make it cllarger to get this large scarred down lipoma out. I started with taking donw the superfical area staying close to the subcutaneous fat.  Then I divided the fat longitudinally to get down to the fascia.   I did get into the muscle layer lterally. There was no easy plane so I just removed a quarter of the lipoma at a time moslty with heavy scissors.   The most difficult part to get out was the more superior medial quarter as it seemed to be attached to the fascia more. And there was an area that seemed harder then the rest. Once I had removed the lipoma I looked to see if there was any left and felt that I had removed it all.   Hemoastais was done with pressure and some cautery. I did try to use cautery on the lipoma but it was to thick.  After the removal I irrigated several times and did not see any more bleeding.  I then closed the skin in  multiple layers with 0 vicryl.   Then a running suture with 3-0 monocryl.  The undermining of the skin was done with the removal of the lipoma.   The wound was reinforced with 0 nylon interrupted vertical mattress suture time 5, along with a pressure dressing.   Anesthesia:   0.25 % marcaine with epinephrine   Est. blood loss: 60  cc  Patient tolerated procedure well.  Hemostasis obtained with pressure.  Follow up with me in 2 weeks. For suture removal and path report                  On the midline more to the right side is a about 8 by 8 cm slightly mobile mass that is probably a lipoma or brown fat and is probably deep to the fascia.   Assessment: On the midline more to the right side is a about 8 by 8 cm slightly mobile mass that is probably a lipoma or brown fat and is probably deep to the fascia.    Plan to do patient has a trigger finger that is bothering him more so he will do this first.  Then will do the lump removal in the OR with  General anesthesia  And him in the prone position.           Again, thank you for allowing me to participate in the care of your patient.        Sincerely,        Frank Vega MD

## 2020-09-01 ENCOUNTER — OFFICE VISIT (OUTPATIENT)
Dept: SURGERY | Facility: CLINIC | Age: 50
End: 2020-09-01
Payer: COMMERCIAL

## 2020-09-01 VITALS
SYSTOLIC BLOOD PRESSURE: 130 MMHG | BODY MASS INDEX: 47.57 KG/M2 | WEIGHT: 296 LBS | HEART RATE: 62 BPM | DIASTOLIC BLOOD PRESSURE: 83 MMHG | HEIGHT: 66 IN

## 2020-09-01 DIAGNOSIS — D17.30 LIPOMA OF SKIN AND SUBCUTANEOUS TISSUE: Primary | ICD-10-CM

## 2020-09-01 PROCEDURE — 99024 POSTOP FOLLOW-UP VISIT: CPT | Performed by: SURGERY

## 2020-09-01 ASSESSMENT — MIFFLIN-ST. JEOR: SCORE: 2140.4

## 2020-09-01 NOTE — LETTER
9/1/2020         RE: Salvatore Kim  [address] [place] [address]        Dear Colleague,    Thank you for referring your patient, Salvatore Kim, to the [facility]. Please see a copy of my visit note below.    Salvatore is a 50 year old male who is status post removal of large lipoma on the upper back.   with no chills and nofever.      Patient's  Pain is  improving.  Appetite is  improving.    Wound(s)  Is/are   clean dry and intact with no evidence of infection.  Does have some numbness around the upper part and the incision  No obvious evidence of infection.  .   May have some fluid in the upper part./    I did put on antibiotics and patient has not noticed difference so doubt it was infected.     Did take out one suture and although it did not open up feel we should wait a little longer like 2 more weeks before removing the  Suture. .         Plan: follow up in 2 weeks.   Use antibiotic ointment on wound at night.     Time spent with the patient with greater that 50% of the time in discussion was 20 minutes.  Frank Vega MD      Salvatore is a 50 year old male who is status post removal of a lipoma on the neck.  with no  chills and no fever.       Patient's  Pain is  improving.  Appetite is  improving.     Wound(s)  Is/are   clean dry and intact with no evidence of infection.  But does have some fluid  In the upper aspect above his wound and some swelling in the area, but really no pain and no erythema.            Path report shows:        Final Diagnosis    Right upper back, excision-Fibrolipoma   Electronically signed by Lois Quintana MD on 8/20/2020 at 1012    Clinical Information      large growing lipoma right upper back and neck   Gross Description      Right upper back: Is a 9.8 x 9.5 x 3.4 cm aggregate of irregular fatty soft tissue fragments which are serially sectioned revealing a fatty cut surface without areas of hemorrhage or necrosis. On the outer surface there is focal  adherent muscle. Representative sections are submitted in cassette A1 through A5.   (CM)            Plan:remove sutures next week.   Use antibiotic ointment on wound at night.   Will start on bactrim ds bid        Time spent with the patient with greater that 50% of the time in discussion was 15 minutes.  Frank Vega MD                Again, thank you for allowing me to participate in the care of your patient.        Sincerely,        Frank Vega MD

## 2020-09-01 NOTE — PATIENT INSTRUCTIONS
Salvatore is a 50 year old male who is status post removal of large lipoma on the upper back.   with no chills and nofever.      Patient's  Pain is  improving.  Appetite is  improving.    Wound(s)  Is/are   clean dry and intact with no evidence of infection.  Does have some numbness around the upper part and the incision  No obvious evidence of infection.  .   May have some fluid in the upper part./    I did put on antibiotics and patient has not noticed difference so doubt it was infected.     Did take out one suture and although it did not open up feel we should wait a little longer like 2 more weeks before removing the  Suture. .         Plan: follow up in 2 weeks.   Use antibiotic ointment on wound at night.

## 2020-09-01 NOTE — PROGRESS NOTES
Salvatore is a 50 year old male who is status post removal of large lipoma on the upper back.   with no chills and nofever.      Patient's  Pain is  improving.  Appetite is  improving.    Wound(s)  Is/are   clean dry and intact with no evidence of infection.  Does have some numbness around the upper part and the incision  No obvious evidence of infection.  .   May have some fluid in the upper part./    I did put on antibiotics and patient has not noticed difference so doubt it was infected.     Did take out one suture and although it did not open up feel we should wait a little longer like 2 more weeks before removing the  Suture. .         Plan: follow up in 2 weeks.   Use antibiotic ointment on wound at night.     Time spent with the patient with greater that 50% of the time in discussion was 20 minutes.  Frank Vega MD      Salvatore is a 50 year old male who is status post removal of a lipoma on the neck.  with no  chills and no fever.       Patient's  Pain is  improving.  Appetite is  improving.     Wound(s)  Is/are   clean dry and intact with no evidence of infection.  But does have some fluid  In the upper aspect above his wound and some swelling in the area, but really no pain and no erythema.            Path report shows:        Final Diagnosis    Right upper back, excision-Fibrolipoma   Electronically signed by Lois Quintana MD on 8/20/2020 at 1012    Clinical Information      large growing lipoma right upper back and neck   Gross Description      Right upper back: Is a 9.8 x 9.5 x 3.4 cm aggregate of irregular fatty soft tissue fragments which are serially sectioned revealing a fatty cut surface without areas of hemorrhage or necrosis. On the outer surface there is focal adherent muscle. Representative sections are submitted in cassette A1 through A5.   (CM)            Plan:remove sutures next week.   Use antibiotic ointment on wound at night.   Will start on bactrim ds bid        Time spent with the  patient with greater that 50% of the time in discussion was 15 minutes.  Frank Vega MD

## 2020-09-03 ENCOUNTER — OFFICE VISIT (OUTPATIENT)
Dept: ORTHOPEDICS | Facility: CLINIC | Age: 50
End: 2020-09-03
Payer: COMMERCIAL

## 2020-09-03 VITALS
DIASTOLIC BLOOD PRESSURE: 73 MMHG | BODY MASS INDEX: 47.57 KG/M2 | SYSTOLIC BLOOD PRESSURE: 145 MMHG | OXYGEN SATURATION: 95 % | HEART RATE: 62 BPM | HEIGHT: 66 IN | WEIGHT: 296 LBS

## 2020-09-03 DIAGNOSIS — M65.30 TRIGGER FINGER, ACQUIRED: Primary | ICD-10-CM

## 2020-09-03 PROCEDURE — 99024 POSTOP FOLLOW-UP VISIT: CPT | Performed by: ORTHOPAEDIC SURGERY

## 2020-09-03 ASSESSMENT — MIFFLIN-ST. JEOR: SCORE: 2140.4

## 2020-09-03 NOTE — LETTER
9/3/2020         RE: Salvatore Kim  [address] [facility]  [place] [address]        Dear Colleague,    Thank you for referring your patient, Salvatore Kim, to the [facility]. Please see a copy of my visit note below.    Salvatore Kim is a 50 year old male here for the first postoperative visit, status post right index trigger finger release (done at the same time as a lipoma removal by Dr. Vega at [facility]).    Present symptoms: Pain: and some swelling and tightness with flexion.  Numbness/parasthesias: none.  Any more locking?: no  Findings at surgery: A1 pulley seemed to be released fully.    Exam:  Wound looks good, no evidence of infection. Sutures removed  CMSI   some stiffness with flexion  Swelling of index finger    Of note, his upper back lipoma wound looks good.  No evidence of infection.  Dr. JAIN is keeping the sutures in for a while longer.  We changed the band aids on the back    Impression:  Doing well status post trigger finger release right  index    Plan:  Scar management discussed.  Work on range of motion  Nsaids, ice, elevation for finger swelling.  Follow up as needed.  He is concerned about arthritis of the finger.    DELLA Lipscomb MD  Dept. Orthopedic Surgery  [facility]        Again, thank you for allowing me to participate in the care of your patient.        Sincerely,        Sabino Lipscomb MD

## 2020-09-03 NOTE — PROGRESS NOTES
Salvatore Kim is a 50 year old male here for the first postoperative visit, status post right index trigger finger release (done at the same time as a lipoma removal by Dr. Vega at [facility]).    Present symptoms: Pain: and some swelling and tightness with flexion.  Numbness/parasthesias: none.  Any more locking?: no  Findings at surgery: A1 pulley seemed to be released fully.    Exam:  Wound looks good, no evidence of infection. Sutures removed  CMSI   some stiffness with flexion  Swelling of index finger    Of note, his upper back lipoma wound looks good.  No evidence of infection.  Dr. JAIN is keeping the sutures in for a while longer.  We changed the band aids on the back    Impression:  Doing well status post trigger finger release right  index    Plan:  Scar management discussed.  Work on range of motion  Nsaids, ice, elevation for finger swelling.  Follow up as needed.  He is concerned about arthritis of the finger.    DELLA Lipscomb MD  Dept. Orthopedic Surgery  [facility]

## 2020-09-15 ENCOUNTER — OFFICE VISIT (OUTPATIENT)
Dept: SURGERY | Facility: CLINIC | Age: 50
End: 2020-09-15
Payer: COMMERCIAL

## 2020-09-15 VITALS
HEART RATE: 80 BPM | HEIGHT: 66 IN | WEIGHT: 296 LBS | SYSTOLIC BLOOD PRESSURE: 118 MMHG | BODY MASS INDEX: 47.57 KG/M2 | DIASTOLIC BLOOD PRESSURE: 54 MMHG

## 2020-09-15 DIAGNOSIS — D17.30 LIPOMA OF SKIN AND SUBCUTANEOUS TISSUE: Primary | ICD-10-CM

## 2020-09-15 PROCEDURE — 99024 POSTOP FOLLOW-UP VISIT: CPT | Performed by: SURGERY

## 2020-09-15 ASSESSMENT — MIFFLIN-ST. JEOR: SCORE: 2140.4

## 2020-09-15 NOTE — LETTER
9/15/2020         RE: Salvatore Kim  [address] [facility]  [place] [address]        Dear Colleague,    Thank you for referring your patient, Salvatore Kim, to the [facility]. Please see a copy of my visit note below.    Salvatore is a 50 year old male who is status post removal of large lipoma on the left upper back /neck.  with no chills and no fever.      Patient's  Pain is  improving.  Appetite is  improving.    Wound(s)  Is/are   clean dry and intact with no evidence of infection.    Sutures removed      Plan: follow up as needed.  Use antibiotic ointment on wound at night.     Time spent with the patient with greater that 50% of the time in discussion was 15 minutes.  Frank Vega MD                  Again, thank you for allowing me to participate in the care of your patient.        Sincerely,        Frank Vega MD

## 2020-09-15 NOTE — PROGRESS NOTES
Salvatore is a 50 year old male who is status post removal of large lipoma on the left upper back /neck.  with no chills and no fever.      Patient's  Pain is  improving.  Appetite is  improving.    Wound(s)  Is/are   clean dry and intact with no evidence of infection.    Sutures removed      Plan: follow up as needed.  Use antibiotic ointment on wound at night.     Time spent with the patient with greater that 50% of the time in discussion was 15 minutes.  Frank Vega MD

## 2021-01-15 ENCOUNTER — HEALTH MAINTENANCE LETTER (OUTPATIENT)
Age: 51
End: 2021-01-15

## 2021-01-20 NOTE — PROGRESS NOTES
SUBJECTIVE:   Salvatore Kim is here in follow up of right hand pain.  Had right index trigger finger release in September:     Present symptoms: persistent stiffness to flexion of the index finger.  Thinks he has arthritis. His mom has bad osteoarthritis of the hands.  Gets swelling of index and 3rd  No more locking  Pain index mcp volar and dorsal.  Pain if bumps MCP joints #2 and #3    3rd finger locking  4th finger feels like it's going to lock.  Treatments tried to this point: none, warm water.    Review of Systems:  Constitutional/General: Negative for fever, chills, change in weight  Integumentary/Skin: Negative for worrisome rashes, moles, or lesions  Neuro: Negative for weakness, dizziness, or paresthesias   Psychiatric: negative for changes in mood or affect    OBJECTIVE:  Physical Exam:  BP (!) 140/78 (BP Location: Right arm, Patient Position: Sitting, Cuff Size: Adult Large)   Pulse 69   SpO2 96%   General Appearance: healthy, alert and no distress   Skin: no suspicious lesions or rashes  Neuro: Normal strength and tone, mentation intact and speech normal  Vascular: good pulses, and capillary refill   Lymph: no lymphadenopathy   Psych:  mentation appears normal and affect normal/bright  Resp: no increased work of breathing    Right Hand Exam:  Inspection: some swelling 2nd and 3rd fingers  Tender over mcp's 2 and 3  Non-tender over 2nd A1 pulley  Tender over 3rd A1 and 4th A1    ROM: pain in mcp of 2nd if flexed too far.  Some popping of the 3rd no triggering.  No popping or triggering of the 4th.  No definite joint hypertrophy noted in hand.    X-rays:  Obtained today of the  Right hand  reviewed in the office with the patient demonstrate  Some minimal narrowing of the 3rd MCP joint with small osteophytes. The 2nd MCP may have a tiny osteophyte, but looks pretty good.     ASSESSMENT:   Encounter Diagnoses   Name Primary?     Pain of right hand Yes     Osteoarthritis of right hand, unspecified  osteoarthritis type      Trigger finger, acquired right 3rd        PLAN:   The significant discomfort that he has in the MCP joints does not seem to match up with the mild pathology seen on the x-rays.  Thus I suggested some screening labs for inflammatory arthritis.  My chart for the results.  I ordered a Medrol Dosepak to take care of the acute inflammation.  I recommended Voltaren gel over-the-counter or oral anti-inflammatories.  He does not think the triggering is bad enough to warrant an injection of the third trigger finger.  Consider MCP steroid injection, possibly under image guidance in the future.    Total time spent, aside from any procedure time, was 27 minutes; with 15 minutes spent face-to-face with patient dedicated to education, counseling and a development of a treatment plan.      Return to clinic: as needed     DELLA Lipscomb MD  Dept. Orthopedic Surgery  [facility]

## 2021-01-21 ENCOUNTER — OFFICE VISIT (OUTPATIENT)
Dept: ORTHOPEDICS | Facility: CLINIC | Age: 51
End: 2021-01-21
Payer: COMMERCIAL

## 2021-01-21 ENCOUNTER — ANCILLARY PROCEDURE (OUTPATIENT)
Dept: GENERAL RADIOLOGY | Facility: CLINIC | Age: 51
End: 2021-01-21
Attending: ORTHOPAEDIC SURGERY
Payer: COMMERCIAL

## 2021-01-21 VITALS — HEART RATE: 69 BPM | SYSTOLIC BLOOD PRESSURE: 136 MMHG | DIASTOLIC BLOOD PRESSURE: 82 MMHG | OXYGEN SATURATION: 96 %

## 2021-01-21 DIAGNOSIS — M79.641 PAIN OF RIGHT HAND: ICD-10-CM

## 2021-01-21 DIAGNOSIS — M19.041 OSTEOARTHRITIS OF RIGHT HAND, UNSPECIFIED OSTEOARTHRITIS TYPE: ICD-10-CM

## 2021-01-21 DIAGNOSIS — M65.30 TRIGGER FINGER, ACQUIRED: ICD-10-CM

## 2021-01-21 DIAGNOSIS — M79.641 PAIN OF RIGHT HAND: Primary | ICD-10-CM

## 2021-01-21 LAB
ERYTHROCYTE [SEDIMENTATION RATE] IN BLOOD BY WESTERGREN METHOD: 15 MM/H (ref 0–20)
URATE SERPL-MCNC: 5.5 MG/DL (ref 3.5–7.2)

## 2021-01-21 PROCEDURE — 99214 OFFICE O/P EST MOD 30 MIN: CPT | Performed by: ORTHOPAEDIC SURGERY

## 2021-01-21 PROCEDURE — 84550 ASSAY OF BLOOD/URIC ACID: CPT | Performed by: ORTHOPAEDIC SURGERY

## 2021-01-21 PROCEDURE — 36415 COLL VENOUS BLD VENIPUNCTURE: CPT | Performed by: ORTHOPAEDIC SURGERY

## 2021-01-21 PROCEDURE — 73130 X-RAY EXAM OF HAND: CPT | Mod: RT | Performed by: RADIOLOGY

## 2021-01-21 PROCEDURE — 86038 ANTINUCLEAR ANTIBODIES: CPT | Performed by: ORTHOPAEDIC SURGERY

## 2021-01-21 PROCEDURE — 86431 RHEUMATOID FACTOR QUANT: CPT | Performed by: ORTHOPAEDIC SURGERY

## 2021-01-21 PROCEDURE — 85652 RBC SED RATE AUTOMATED: CPT | Performed by: ORTHOPAEDIC SURGERY

## 2021-01-21 RX ORDER — METHYLPREDNISOLONE 4 MG
TABLET, DOSE PACK ORAL
Qty: 21 TABLET | Refills: 0 | Status: SHIPPED | OUTPATIENT
Start: 2021-01-21 | End: 2021-02-17 | Stop reason: ALTCHOICE

## 2021-01-21 ASSESSMENT — PAIN SCALES - GENERAL: PAINLEVEL: MODERATE PAIN (4)

## 2021-01-21 NOTE — LETTER
1/21/2021         RE: Salvatore Kim  [address]  [place] [address]        Dear Colleague,    Thank you for referring your patient, Salvatore Kim, to the [facility]. Please see a copy of my visit note below.    SUBJECTIVE:   Salvatore Kim is here in follow up of right hand pain.  Had right index trigger finger release in September:     Present symptoms: persistent stiffness to flexion of the index finger.  Thinks he has arthritis. His mom has bad osteoarthritis of the hands.  Gets swelling of index and 3rd  No more locking  Pain index mcp volar and dorsal.  Pain if bumps MCP joints #2 and #3    3rd finger locking  4th finger feels like it's going to lock.  Treatments tried to this point: none, warm water.    Review of Systems:  Constitutional/General: Negative for fever, chills, change in weight  Integumentary/Skin: Negative for worrisome rashes, moles, or lesions  Neuro: Negative for weakness, dizziness, or paresthesias   Psychiatric: negative for changes in mood or affect    OBJECTIVE:  Physical Exam:  BP (!) 140/78 (BP Location: Right arm, Patient Position: Sitting, Cuff Size: Adult Large)   Pulse 69   SpO2 96%   General Appearance: healthy, alert and no distress   Skin: no suspicious lesions or rashes  Neuro: Normal strength and tone, mentation intact and speech normal  Vascular: good pulses, and capillary refill   Lymph: no lymphadenopathy   Psych:  mentation appears normal and affect normal/bright  Resp: no increased work of breathing    Right Hand Exam:  Inspection: some swelling 2nd and 3rd fingers  Tender over mcp's 2 and 3  Non-tender over 2nd A1 pulley  Tender over 3rd A1 and 4th A1    ROM: pain in mcp of 2nd if flexed too far.  Some popping of the 3rd no triggering.  No popping or triggering of the 4th.  No definite joint hypertrophy noted in hand.    X-rays:  Obtained today of the  Right hand  reviewed in the office with the patient demonstrate  Some minimal  narrowing of the 3rd MCP joint with small osteophytes. The 2nd MCP may have a tiny osteophyte, but looks pretty good.     ASSESSMENT:   Encounter Diagnoses   Name Primary?     Pain of right hand Yes     Osteoarthritis of right hand, unspecified osteoarthritis type      Trigger finger, acquired right 3rd        PLAN:   The significant discomfort that he has in the MCP joints does not seem to match up with the mild pathology seen on the x-rays.  Thus I suggested some screening labs for inflammatory arthritis.  My chart for the results.  I ordered a Medrol Dosepak to take care of the acute inflammation.  I recommended Voltaren gel over-the-counter or oral anti-inflammatories.  He does not think the triggering is bad enough to warrant an injection of the third trigger finger.  Consider MCP steroid injection, possibly under image guidance in the future.    Total time spent, aside from any procedure time, was 27 minutes; with 15 minutes spent face-to-face with patient dedicated to education, counseling and a development of a treatment plan.      Return to clinic: as needed     DELLA Lipscomb MD  Dept. Orthopedic Surgery  [facility]       Again, thank you for allowing me to participate in the care of your patient.        Sincerely,        Sabino Lipscomb MD

## 2021-01-22 LAB
ANA SER QL IF: NEGATIVE
RHEUMATOID FACT SER NEPH-ACNC: <7 IU/ML (ref 0–20)

## 2021-02-03 DIAGNOSIS — M19.041 OSTEOARTHRITIS OF RIGHT HAND, UNSPECIFIED OSTEOARTHRITIS TYPE: Primary | ICD-10-CM

## 2021-02-17 ENCOUNTER — OFFICE VISIT (OUTPATIENT)
Dept: ORTHOPEDICS | Facility: CLINIC | Age: 51
End: 2021-02-17
Attending: ORTHOPAEDIC SURGERY
Payer: COMMERCIAL

## 2021-02-17 VITALS — BODY MASS INDEX: 46.61 KG/M2 | WEIGHT: 290 LBS | HEIGHT: 66 IN

## 2021-02-17 DIAGNOSIS — M65.331 TRIGGER MIDDLE FINGER OF RIGHT HAND: Primary | ICD-10-CM

## 2021-02-17 DIAGNOSIS — M19.041 OSTEOARTHRITIS OF RIGHT HAND, UNSPECIFIED OSTEOARTHRITIS TYPE: ICD-10-CM

## 2021-02-17 PROCEDURE — 20550 NJX 1 TENDON SHEATH/LIGAMENT: CPT | Mod: RT | Performed by: FAMILY MEDICINE

## 2021-02-17 RX ADMIN — LIDOCAINE HYDROCHLORIDE 0.5 ML: 10 INJECTION, SOLUTION INFILTRATION; PERINEURAL at 16:01

## 2021-02-17 RX ADMIN — TRIAMCINOLONE ACETONIDE 20 MG: 40 INJECTION, SUSPENSION INTRA-ARTICULAR; INTRAMUSCULAR at 16:01

## 2021-02-17 ASSESSMENT — MIFFLIN-ST. JEOR: SCORE: 2113.18

## 2021-02-17 NOTE — LETTER
2021         RE: Salvatore Kim  [address] [facility]  [place] [address]        Dear Colleague,    Thank you for referring your patient, Salvatore Kim, to the [facility] [place]. Please see a copy of my visit note below.    Salvatore Kim  :  1970  DOS: 2021  MRN: [id number]    Sports Medicine Clinic Procedure    Trigger finger injection of right long finger    Clinical History: Gradual onset of triggering in multiple fingers of right hand over the past 1 - 2 years.  Patient had right index finger trigger finger release in 2020 that provided pain relief, but still has some functional deficits.  He describing majority of pain in long finger (PIP joint, volar palm) and second MCP joint.    Diagnosis:   1. Trigger middle finger of right hand    2. Osteoarthritis of right hand, unspecified osteoarthritis type      Referring Physician: THADDEUS Lipscomb MD  Hand / Upper Extremity Injection/Arthrocentesis: R long A1    Date/Time: 2021 4:01 PM  Performed by: Behzad Mclaughlin DO  Authorized by: Behzad Mclaughlin DO     Indications:  Pain and therapeutic  Needle Size:  25 G  Guidance: landmark    Approach:  Volar  Condition: trigger finger    Location:  Long finger    Site:  R long A1  Medications:  0.5 mL lidocaine 1 %; 20 mg triamcinolone 40 MG/ML  Outcome:  Tolerated well, no immediate complications  Procedure discussed: discussed risks, benefits, and alternatives    Consent Given by:  Patient  Timeout: timeout called immediately prior to procedure    Prep: patient was prepped and draped in usual sterile fashion          Impression:  Successful 3rd A1 pulley tendon sheath CSI    Plan:  Follow up prn based on clinical progress  Reviewed option to have appt scheduled in the future if sx are not improved, especially at the MCP joint  Expectations and goals of CSI reviewed  Often 2-3 days for steroid effect, and can take up to two weeks for  maximum effect  We discussed modified progressive pain-free activity as tolerated  Do not overuse in first two weeks if feeling better due to concern for vulnerability while steroid is working  Supportive care reviewed  All questions were answered today  Contact us with additional questions or concerns  Signs and sx of concern reviewed      Behzad Mclaughlin DO, MARY  Primary Care Sports Medicine  [facility]       Again, thank you for allowing me to participate in the care of your patient.        Sincerely,        Behzad Mclaughlin DO

## 2021-02-17 NOTE — PROGRESS NOTES
Salvatore Kim  :  1970  DOS: 2021  MRN: [id number]    Sports Medicine Clinic Procedure    Trigger finger injection of right long finger    Clinical History: Gradual onset of triggering in multiple fingers of right hand over the past 1 - 2 years.  Patient had right index finger trigger finger release in 2020 that provided pain relief, but still has some functional deficits.  He describing majority of pain in long finger (PIP joint, volar palm) and second MCP joint.    Diagnosis:   1. Trigger middle finger of right hand    2. Osteoarthritis of right hand, unspecified osteoarthritis type      Referring Physician: THADDEUS Lipscomb MD  Hand / Upper Extremity Injection/Arthrocentesis: R long A1    Date/Time: 2021 4:01 PM  Performed by: Behzad Mclaughlin DO  Authorized by: Behzad Mclaughlin DO     Indications:  Pain and therapeutic  Needle Size:  25 G  Guidance: landmark    Approach:  Volar  Condition: trigger finger    Location:  Long finger    Site:  R long A1  Medications:  0.5 mL lidocaine 1 %; 20 mg triamcinolone 40 MG/ML  Outcome:  Tolerated well, no immediate complications  Procedure discussed: discussed risks, benefits, and alternatives    Consent Given by:  Patient  Timeout: timeout called immediately prior to procedure    Prep: patient was prepped and draped in usual sterile fashion          Impression:  Successful 3rd A1 pulley tendon sheath CSI    Plan:  Follow up prn based on clinical progress  Reviewed option to have appt scheduled in the future if sx are not improved, especially at the MCP joint  Expectations and goals of CSI reviewed  Often 2-3 days for steroid effect, and can take up to two weeks for maximum effect  We discussed modified progressive pain-free activity as tolerated  Do not overuse in first two weeks if feeling better due to concern for vulnerability while steroid is working  Supportive care reviewed  All questions were answered today  Contact us  with additional questions or concerns  Signs and sx of concern reviewed      Behzad Mclaughlin DO, MARY  Primary Care Sports Medicine  [facility]

## 2021-02-22 RX ORDER — LIDOCAINE HYDROCHLORIDE 10 MG/ML
0.5 INJECTION, SOLUTION INFILTRATION; PERINEURAL
Status: DISCONTINUED | OUTPATIENT
Start: 2021-02-17 | End: 2021-08-18

## 2021-02-22 RX ORDER — TRIAMCINOLONE ACETONIDE 40 MG/ML
20 INJECTION, SUSPENSION INTRA-ARTICULAR; INTRAMUSCULAR
Status: DISCONTINUED | OUTPATIENT
Start: 2021-02-17 | End: 2021-08-18

## 2021-05-19 ENCOUNTER — IMMUNIZATION (OUTPATIENT)
Dept: LAB | Facility: CLINIC | Age: 51
End: 2021-05-19
Payer: COMMERCIAL

## 2021-06-04 ENCOUNTER — OFFICE VISIT (OUTPATIENT)
Dept: SURGERY | Facility: CLINIC | Age: 51
End: 2021-06-04
Payer: COMMERCIAL

## 2021-06-04 ENCOUNTER — TELEPHONE (OUTPATIENT)
Dept: DERMATOLOGY | Facility: CLINIC | Age: 51
End: 2021-06-04

## 2021-06-04 VITALS — WEIGHT: 315 LBS | BODY MASS INDEX: 49.44 KG/M2 | HEIGHT: 67 IN

## 2021-06-04 DIAGNOSIS — D17.30 LIPOMA OF SKIN AND SUBCUTANEOUS TISSUE: Primary | ICD-10-CM

## 2021-06-04 DIAGNOSIS — I89.0 LYMPHEDEMA: ICD-10-CM

## 2021-06-04 PROCEDURE — 99203 OFFICE O/P NEW LOW 30 MIN: CPT | Performed by: PLASTIC SURGERY

## 2021-06-04 RX ORDER — CLOMIPHENE CITRATE 50 MG/1
50 TABLET ORAL DAILY
COMMUNITY
End: 2024-01-17

## 2021-06-04 ASSESSMENT — PAIN SCALES - GENERAL: PAINLEVEL: NO PAIN (0)

## 2021-06-04 ASSESSMENT — MIFFLIN-ST. JEOR: SCORE: 2257.87

## 2021-06-04 NOTE — PROGRESS NOTES
REFERRING PROVIDER:  Nghia Momin PA-C    PRESENTING COMPLAINT:  Consultation for extremely tight thighs causing restriction in movement.    HISTORY OF PRESENTING COMPLAINT:  Mr. Kim is 51 years old.  He has a very specific concern, and that is that whenever he stands with his legs as close to one another as possible, he has such tightness in his thighs that he is unable to stand because of pain and he is unable to walk properly.  He is here to discuss options to try to improve this.  He states that whenever he has lost weight in the past and he has less volume in his thighs, this disappears.    PAST MEDICAL HISTORY:  Nil.    PAST SURGICAL HISTORY:  Shoulder surgery, lipoma excision, bilateral carpal tunnel release.    MEDICATIONS:  Testosterone.    ALLERGIES:  Nil.    SOCIAL HISTORY:  Used to smoke about 2 packs a day for 30 years, quit 4 years ago.  Does not drink anymore.  Does not do drugs.   He is a .  Lives in [place].    REVIEW OF SYSTEMS:  Denies chest pain, shortness of breath, MI, CVA, DVT and PE.    PHYSICAL EXAMINATION:  Vital signs are stable.  He is afebrile, in no obvious distress.  He is 5 feet 7 inches, 319 pounds, BMI of 50 kg/m2.  On examination of his thighs, he has very little excess skin.  His actual skin pinch thickness is hard to assess, as I cannot pinch the skin.  He has got pitting edema in both lower extremities at least 1 to 2+.  The thighs themselves when he is adducted as possible are rock hard.  When he sits down and abducts his thighs, they are softer.  This is circumferentially apparent.    ASSESSMENT AND PLAN:  Based on above findings, a diagnosis of extremely tight compartments of bilateral thighs was made.  I think part of the problem is the tight constricting deeper structures and, of course, the volume from fluid as well as maybe adiposity.  I think the best course of action is to lose weight and get his edema under control and to discuss with Orthopedics whether  a full thigh fasciotomy would help with his symptoms.  We will refer him to Orthopedics and to Lymphedema Therapy, and he is already with medical weight management.  I will see him back once the above consultations are done.    Total time spent with chart review, visit itself and post-visit paperwork was 30 minutes.    cc:  Nghia Momin PA-C  [facility]  [id number] [place] [address]

## 2021-06-04 NOTE — TELEPHONE ENCOUNTER
referring pt to Ortho for right thigh Fasciotomy and to the Lymphedema clinic for edema to bilateral legs. Referrals placed. Will route to procedure schedulers to please assist with scheduling consults. Thank you..Farrah Knapp RN

## 2021-06-04 NOTE — LETTER
6/4/2021         RE: Salvatore Kim  [address] [facility] [address]        Dear Colleague,    Thank you for referring your patient, Salvatore Kim, to the [facility]. Please see a copy of my visit note below.    REFERRING PROVIDER:  Nghia Momin PA-C    PRESENTING COMPLAINT:  Consultation for extremely tight thighs causing restriction in movement.    HISTORY OF PRESENTING COMPLAINT:  Mr. Kim is 51 years old.  He has a very specific concern, and that is that whenever he stands with his legs as close to one another as possible, he has such tightness in his thighs that he is unable to stand because of pain and he is unable to walk properly.  He is here to discuss options to try to improve this.  He states that whenever he has lost weight in the past and he has less volume in his thighs, this disappears.    PAST MEDICAL HISTORY:  Nil.    PAST SURGICAL HISTORY:  Shoulder surgery, lipoma excision, bilateral carpal tunnel release.    MEDICATIONS:  Testosterone.    ALLERGIES:  Nil.    SOCIAL HISTORY:  Used to smoke about 2 packs a day for 30 years, quit 4 years ago.  Does not drink anymore.  Does not do drugs.   He is a .  Lives in [place].    REVIEW OF SYSTEMS:  Denies chest pain, shortness of breath, MI, CVA, DVT and PE.    PHYSICAL EXAMINATION:  Vital signs are stable.  He is afebrile, in no obvious distress.  He is 5 feet 7 inches, 319 pounds, BMI of 50 kg/m2.  On examination of his thighs, he has very little excess skin.  His actual skin pinch thickness is hard to assess, as I cannot pinch the skin.  He has got pitting edema in both lower extremities at least 1 to 2+.  The thighs themselves when he is adducted as possible are rock hard.  When he sits down and abducts his thighs, they are softer.  This is circumferentially apparent.    ASSESSMENT AND PLAN:  Based on above findings, a diagnosis of extremely tight compartments of bilateral thighs was made.  I think part of  the problem is the tight constricting deeper structures and, of course, the volume from fluid as well as maybe adiposity.  I think the best course of action is to lose weight and get his edema under control and to discuss with Orthopedics whether a full thigh fasciotomy would help with his symptoms.  We will refer him to Orthopedics and to Lymphedema Therapy, and he is already with medical weight management.  I will see him back once the above consultations are done.    Total time spent with chart review, visit itself and post-visit paperwork was 30 minutes.    cc:  Nghia Momin PA-C  [facility]  [id number] [place] [address]          Again, thank you for allowing me to participate in the care of your patient.        Sincerely,        VAMSI Casanova MD

## 2021-06-04 NOTE — NURSING NOTE
Salvatore Kim's goals for this visit include:   Chief Complaint   Patient presents with     Consult     Liposuction-upper inner thighs        He requests these members of his care team be copied on today's visit information: no    PCP: Nghia Momin    Referring Provider:  No referring provider defined for this encounter.    There were no vitals taken for this visit.    Do you need any medication refills at today's visit? Romy Whitten on 6/4/2021 at 2:01 PM

## 2021-06-07 ENCOUNTER — TELEPHONE (OUTPATIENT)
Dept: ORTHOPEDICS | Facility: CLINIC | Age: 51
End: 2021-06-07

## 2021-06-07 NOTE — TELEPHONE ENCOUNTER
Reason for call:  Other   Patient called regarding (reason for call): appointment  Additional comments: Patient has a diagnosis of Lipoma of skin and subcutaneous tissue. Please call to schedule with in the 24 hours.    Phone number to reach patient:  Home number on file [phone] (home)    Best Time:  asap    Can we leave a detailed message on this number?  YES    Travel screening: Not Applicable     Edson Silva on 6/7/2021 at 2:31 PM

## 2021-06-07 NOTE — TELEPHONE ENCOUNTER
[employer] message sent to patient to remind him to schedule as noted below.      Farrah Rothman  Surgical Specialties Procedure   [facility]  6/7/2021 8:27 AM

## 2021-06-07 NOTE — TELEPHONE ENCOUNTER
Called and spoke to pt. Informed pt that first openings are virtual. Pt declined virtually and wanted to be seen in person. Pt requested the 'last appointment of the day'. Scheduled per pt's time frame. Pt confirmed appt date/time/location. Answered all questions, mailed appt reminder to address on file which was confirmed by pt.     Kimberli Farmer ATC

## 2021-06-10 NOTE — TELEPHONE ENCOUNTER
Patient scheduled with ortho at the  on 7/7/21 at 2:45pm with NOMI ROMANO    Closing encounter.    Pavithra Valles LPN

## 2021-08-18 ENCOUNTER — OFFICE VISIT (OUTPATIENT)
Dept: ORTHOPEDICS | Facility: CLINIC | Age: 51
End: 2021-08-18
Payer: COMMERCIAL

## 2021-08-18 VITALS
WEIGHT: 290 LBS | HEIGHT: 68 IN | DIASTOLIC BLOOD PRESSURE: 76 MMHG | SYSTOLIC BLOOD PRESSURE: 131 MMHG | BODY MASS INDEX: 43.95 KG/M2

## 2021-08-18 DIAGNOSIS — M65.339 TRIGGER MIDDLE FINGER, UNSPECIFIED LATERALITY: Primary | ICD-10-CM

## 2021-08-18 PROCEDURE — 99214 OFFICE O/P EST MOD 30 MIN: CPT | Performed by: FAMILY MEDICINE

## 2021-08-18 ASSESSMENT — MIFFLIN-ST. JEOR: SCORE: 2139.93

## 2021-08-18 NOTE — PROGRESS NOTES
Salvatore Kim  :  1970  DOS: 2021  MRN: [id number]    Sports Medicine Clinic Visit    PCP: Nghia Momin    Salvatore Kim is a 51 year old Right hand dominant male who is seen as a self referral presenting with left long finger triggering.  He states it has been present for about 3-4 weeks.   Feels he is unable to flex his finger.  States there was 2 weeks he could not move his finger at all     Injury: Reports insidious onset without acute precipitating event that patient first noticed approximately 3-4 week(s).  Pain located over left long finger , nonradiating.   Additional Features:  Positive: swelling, catching and locking.  Symptoms are better with Nothing.  Symptoms are worse with: flexion, use .  Other evaluation and/or treatments so far consists of: Nothing.  Recent imaging completed: No recent imaging completed.  Prior History of related problems: trigger finger on other hand injected in Feburary     Social History: currently employed as     Review of Systems  Musculoskeletal: as above  Remainder of review of systems is negative including constitutional, CV, pulmonary, GI, Skin and Neurologic except as noted in HPI or medical history.    Past Medical History:   Diagnosis Date     Allergies      Arthritis      Depression, anxiety      ED (erectile dysfunction)      Hyperlipidemia      Metabolic syndrome      Trigger finger, right      Past Surgical History:   Procedure Laterality Date     COLONOSCOPY  2013    Procedure: COLONOSCOPY;  COLONOSCOPY, EXAM UNDER ANESTHESIA,  FISTULOTOMY;  Surgeon: Maxwell Israel MD;  Location: [facility]     EXAM UNDER ANESTHESIA ANUS  2013    Procedure: EXAM UNDER ANESTHESIA ANUS;;  Surgeon: Maxwell Israel MD;  Location: [facility]     FISTULOTOMY RECTUM  2013    Procedure: FISTULOTOMY RECTUM;;  Surgeon: Maxwell Israel MD;  Location: [facility]     HEMORRHOID SURGERY       RELEASE CARPAL TUNNEL      Rt     ROTATOR CUFF REPAIR RT/LT  "Right 2017     Family History   Problem Relation Age of Onset     Myocardial Infarction Father 45     Heart Disease Father      C.A.D. Father      Myocardial Infarction Paternal Grandfather 45     Myocardial Infarction Maternal Grandfather 45     Cancer Mother      Diabetes Paternal Grandmother      Diabetes Paternal Aunt      Cancer - colorectal No family hx of      Prostate Cancer No family hx of      Hypertension No family hx of      Cerebrovascular Disease No family hx of      Thyroid Disease No family hx of      Glaucoma No family hx of      Macular Degeneration No family hx of          Objective  /76   Ht 1.727 m (5' 8\")   Wt 131.5 kg (290 lb)   BMI 44.09 kg/m        General: healthy, alert and in no distress      HEENT: no scleral icterus or conjunctival erythema     Skin: no suspicious lesions or rash. No jaundice.     CV: regular rhythm by palpation, 2+ distal pulses, no pedal edema      Resp: normal respiratory effort without conversational dyspnea     Psych: normal mood and affect      Gait: nonantalgic, appropriate coordination and balance     Neuro: normal light touch sensory exam of the extremities. Motor strength as noted below     Bilateral hand exam  Inspection: Left Long Finger:  slight swelling, over MCP, flexor tendon nodule  Tender: Left Long Finger:   MCP joint, triggering, A1 Pulley  Non-tender: remainder of hand  Range of Motion left Long Finger:   Painful full extension, triggering of A1 pulley with flexion  Strength: full strength    Very mild residual symptoms over the A1 pulley of the third and fourth digits on the right hand as well, no active triggering at this time      Radiology:  No formal imaging needed today    Assessment:  1. Trigger middle finger, unspecified laterality        Plan:  Discussed the assessment with the patient.  Follow up: As needed based on clinical progress  Good relief from prior right third digit A1 pulley tendon sheath injection, symptoms in that " digit and the fourth digit in the right hand starting to very mildly recur  Oval 8 splints provided today for left and right hands to prevent triggering and promote rest of the tendon  Reviewed the option for left third digit A1 pulley tendon sheath injection, he would like to defer for now until after his upcoming bariatric surgery  Activity modification strategies and safe over-the-counter medications like oral Tylenol and topical Voltaren gel reviewed  We discussed modified progressive pain-free activity as tolerated  Home handouts provided and supportive care reviewed  All questions were answered today  Contact us with additional questions or concerns  Signs and sx of concern reviewed      Behzad Mclaughlin DO, MARY  Sports Medicine Physician  [facility]          Disclaimer: This note consists of symbols derived from keyboarding, dictation and/or voice recognition software. As a result, there may be errors in the script that have gone undetected. Please consider this when interpreting information found in this chart.

## 2021-08-18 NOTE — LETTER
2021         RE: Salvatore Kim  [address]  [place] [address]        Dear Colleague,    Thank you for referring your patient, Salvatore Kim, to the [facility] [place]. Please see a copy of my visit note below.      Salvatore Kim  :  1970  DOS: 2021  MRN: [id number]    Sports Medicine Clinic Visit    PCP: Nghia Momin    Salvatore Kim is a 51 year old Right hand dominant male who is seen as a self referral presenting with left long finger triggering.  He states it has been present for about 3-4 weeks.   Feels he is unable to flex his finger.  States there was 2 weeks he could not move his finger at all     Injury: Reports insidious onset without acute precipitating event that patient first noticed approximately 3-4 week(s).  Pain located over left long finger , nonradiating.   Additional Features:  Positive: swelling, catching and locking.  Symptoms are better with Nothing.  Symptoms are worse with: flexion, use .  Other evaluation and/or treatments so far consists of: Nothing.  Recent imaging completed: No recent imaging completed.  Prior History of related problems: trigger finger on other hand injected in Feburary     Social History: currently employed as     Review of Systems  Musculoskeletal: as above  Remainder of review of systems is negative including constitutional, CV, pulmonary, GI, Skin and Neurologic except as noted in HPI or medical history.    Past Medical History:   Diagnosis Date     Allergies      Arthritis      Depression, anxiety      ED (erectile dysfunction)      Hyperlipidemia      Metabolic syndrome      Trigger finger, right      Past Surgical History:   Procedure Laterality Date     COLONOSCOPY  2013    Procedure: COLONOSCOPY;  COLONOSCOPY, EXAM UNDER ANESTHESIA,  FISTULOTOMY;  Surgeon: Maxwell Israel MD;  Location: [facility]     EXAM UNDER ANESTHESIA ANUS  2013    Procedure: EXAM UNDER ANESTHESIA ANUS;;  " Surgeon: Maxwell Israel MD;  Location: [facility]     FISTULOTOMY RECTUM  6/7/2013    Procedure: FISTULOTOMY RECTUM;;  Surgeon: Maxwell Israel MD;  Location: [facility]     HEMORRHOID SURGERY       RELEASE CARPAL TUNNEL  2001    Rt     ROTATOR CUFF REPAIR RT/LT Right 2017     Family History   Problem Relation Age of Onset     Myocardial Infarction Father 45     Heart Disease Father      C.A.D. Father      Myocardial Infarction Paternal Grandfather 45     Myocardial Infarction Maternal Grandfather 45     Cancer Mother      Diabetes Paternal Grandmother      Diabetes Paternal Aunt      Cancer - colorectal No family hx of      Prostate Cancer No family hx of      Hypertension No family hx of      Cerebrovascular Disease No family hx of      Thyroid Disease No family hx of      Glaucoma No family hx of      Macular Degeneration No family hx of          Objective  /76   Ht 1.727 m (5' 8\")   Wt 131.5 kg (290 lb)   BMI 44.09 kg/m        General: healthy, alert and in no distress      HEENT: no scleral icterus or conjunctival erythema     Skin: no suspicious lesions or rash. No jaundice.     CV: regular rhythm by palpation, 2+ distal pulses, no pedal edema      Resp: normal respiratory effort without conversational dyspnea     Psych: normal mood and affect      Gait: nonantalgic, appropriate coordination and balance     Neuro: normal light touch sensory exam of the extremities. Motor strength as noted below     Bilateral hand exam  Inspection: Left Long Finger:  slight swelling, over MCP, flexor tendon nodule  Tender: Left Long Finger:   MCP joint, triggering, A1 Pulley  Non-tender: remainder of hand  Range of Motion left Long Finger:   Painful full extension, triggering of A1 pulley with flexion  Strength: full strength    Very mild residual symptoms over the A1 pulley of the third and fourth digits on the right hand as well, no active triggering at this time      Radiology:  No formal imaging needed " today    Assessment:  1. Trigger middle finger, unspecified laterality        Plan:  Discussed the assessment with the patient.  Follow up: As needed based on clinical progress  Good relief from prior right third digit A1 pulley tendon sheath injection, symptoms in that digit and the fourth digit in the right hand starting to very mildly recur  Oval 8 splints provided today for left and right hands to prevent triggering and promote rest of the tendon  Reviewed the option for left third digit A1 pulley tendon sheath injection, he would like to defer for now until after his upcoming bariatric surgery  Activity modification strategies and safe over-the-counter medications like oral Tylenol and topical Voltaren gel reviewed  We discussed modified progressive pain-free activity as tolerated  Home handouts provided and supportive care reviewed  All questions were answered today  Contact us with additional questions or concerns  Signs and sx of concern reviewed      Behzad Mclaughlin DO, CAQ  Sports Medicine Physician  [facility]          Disclaimer: This note consists of symbols derived from keyboarding, dictation and/or voice recognition software. As a result, there may be errors in the script that have gone undetected. Please consider this when interpreting information found in this chart.        Again, thank you for allowing me to participate in the care of your patient.        Sincerely,        Behzad Mclaughlin DO

## 2021-08-30 ENCOUNTER — NURSE TRIAGE (OUTPATIENT)
Dept: NURSING | Facility: CLINIC | Age: 51
End: 2021-08-30

## 2021-08-30 NOTE — TELEPHONE ENCOUNTER
RN Triage:    Spoke with 51 yr old Salvatore who c/o:    Looking for COVID-19 test for possibly leaving the country.    Pt is hoping for test and results by tomorrow and is inquiring about antigen testing.    PLAN:  Pt advised [facility] only orders COVID-19 PCR testing and through physician order and evaluation.  Advised [facility] has locations and types of testing available on their website.  Pt hung up on Triage RN after receiving information.    Yokasta Rubin RN  [facility]    Reason for Disposition    COVID-19 Testing, questions about    Additional Information    Negative: SEVERE difficulty breathing (e.g., struggling for each breath, speaks in single words)    Negative: Difficult to awaken or acting confused (e.g., disoriented, slurred speech)    Negative: Bluish (or gray) lips or face now    Negative: Shock suspected (e.g., cold/pale/clammy skin, too weak to stand, low BP, rapid pulse)    Negative: Sounds like a life-threatening emergency to the triager    Negative: [1] COVID-19 exposure AND [2] has not completed COVID-19 vaccine series AND [3] no symptoms    Negative: [1] COVID-19 exposure AND [2] completed COVID-19 vaccine series (fully vaccinated) AND [3] no symptoms    Negative: COVID-19 vaccine reaction suspected (e.g., fever, headache, muscle aches) occurring during days 1-3 after getting vaccine    Negative: COVID-19 vaccine, questions about    Negative: [1] COVID-19 vaccine series completed (fully vaccinated) in past 3 months AND [2] new-onset of COVID-19 symptoms BUT [3] no known exposure    Negative: [1] Had lab test confirmed COVID-19 infection within last 3 monthsAND [2] new-onset of COVID-19 symptoms BUT [3] no known exposure    Negative: [1] Lives with someone known to have influenza (flu test positive) AND [2] flu-like symptoms (e.g., cough, runny nose, sore throat, SOB; with or without fever)    Negative: [1] Adult with possible COVID-19 symptoms AND [2] triager concerned about  severity of symptoms or other causes    Negative: COVID-19 and breastfeeding, questions about    Negative: SEVERE or constant chest pain or pressure (Exception: mild central chest pain, present only when coughing)    Negative: MODERATE difficulty breathing (e.g., speaks in phrases, SOB even at rest, pulse 100-120)    Negative: [1] Headache AND [2] stiff neck (can't touch chin to chest)    Negative: MILD difficulty breathing (e.g., minimal/no SOB at rest, SOB with walking, pulse <100)    Negative: Chest pain or pressure    Negative: Patient sounds very sick or weak to the triager    Negative: Fever > 103 F (39.4 C)    Negative: [1] Fever > 101 F (38.3 C) AND [2] age > 60 years    Negative: [1] Fever > 100.0 F (37.8 C) AND [2] bedridden (e.g., nursing home patient, CVA, chronic illness, recovering from surgery)    Negative: [1] HIGH RISK patient (e.g., age > 64 years, diabetes, heart or lung disease, weak immune system) AND [2] new or worsening symptoms    Negative: [1] HIGH RISK patient AND [2] influenza is widespread in the community AND [3] ONE OR MORE respiratory symptoms: cough, sore throat, runny or stuffy nose    Negative: [1] HIGH RISK patient AND [2] influenza exposure within the last 7 days AND [3] ONE OR MORE respiratory symptoms: cough, sore throat, runny or stuffy nose    Negative: Fever present > 3 days (72 hours)    Negative: [1] Fever returns after gone for over 24 hours AND [2] symptoms worse or not improved    Negative: [1] Continuous (nonstop) coughing interferes with work or school AND [2] no improvement using cough treatment per protocol    Negative: [1] COVID-19 infection suspected by caller or triager AND [2] mild symptoms (cough, fever, or others) AND [3] no complications or SOB    Negative: Cough present > 3 weeks    Negative: [1] COVID-19 diagnosed by positive lab test AND [2] NO symptoms (e.g., cough, fever, others)    Negative: [1] COVID-19 diagnosed by positive lab test AND [2] mild  symptoms (e.g., cough, fever, others) AND [3] no complications or SOB    Negative: [1] COVID-19 diagnosed by HCP (doctor, NP or PA) AND [2] mild symptoms (e.g., cough, fever, others) AND [3] no complications or SOB    Negative: [1] COVID-19 diagnosed AND [2] has mild nausea, vomiting or diarrhea    Negative: COVID-19 Home Isolation, questions about    Protocols used: CORONAVIRUS (COVID-19) DIAGNOSED OR [id number] 3.25

## 2021-09-05 ENCOUNTER — HEALTH MAINTENANCE LETTER (OUTPATIENT)
Age: 51
End: 2021-09-05

## 2021-12-10 ENCOUNTER — OFFICE VISIT (OUTPATIENT)
Dept: ORTHOPEDICS | Facility: CLINIC | Age: 51
End: 2021-12-10
Payer: COMMERCIAL

## 2021-12-10 VITALS
WEIGHT: 252.6 LBS | HEART RATE: 50 BPM | DIASTOLIC BLOOD PRESSURE: 64 MMHG | BODY MASS INDEX: 38.28 KG/M2 | SYSTOLIC BLOOD PRESSURE: 114 MMHG | HEIGHT: 68 IN

## 2021-12-10 DIAGNOSIS — M65.332 TRIGGER MIDDLE FINGER OF LEFT HAND: Primary | ICD-10-CM

## 2021-12-10 PROCEDURE — 20550 NJX 1 TENDON SHEATH/LIGAMENT: CPT | Mod: LT | Performed by: FAMILY MEDICINE

## 2021-12-10 PROCEDURE — 99213 OFFICE O/P EST LOW 20 MIN: CPT | Mod: 25 | Performed by: FAMILY MEDICINE

## 2021-12-10 RX ORDER — LIDOCAINE HYDROCHLORIDE 10 MG/ML
0.5 INJECTION, SOLUTION INFILTRATION; PERINEURAL
Status: DISCONTINUED | OUTPATIENT
Start: 2021-12-10 | End: 2022-05-20

## 2021-12-10 RX ORDER — TRIAMCINOLONE ACETONIDE 40 MG/ML
20 INJECTION, SUSPENSION INTRA-ARTICULAR; INTRAMUSCULAR
Status: DISCONTINUED | OUTPATIENT
Start: 2021-12-10 | End: 2022-05-20

## 2021-12-10 RX ADMIN — TRIAMCINOLONE ACETONIDE 20 MG: 40 INJECTION, SUSPENSION INTRA-ARTICULAR; INTRAMUSCULAR at 16:03

## 2021-12-10 RX ADMIN — LIDOCAINE HYDROCHLORIDE 0.5 ML: 10 INJECTION, SOLUTION INFILTRATION; PERINEURAL at 16:03

## 2021-12-10 ASSESSMENT — PAIN SCALES - GENERAL: PAINLEVEL: NO PAIN (0)

## 2021-12-10 ASSESSMENT — MIFFLIN-ST. JEOR: SCORE: 1970.29

## 2021-12-10 NOTE — LETTER
12/10/2021         RE: Salvatore Kim  [address]  [place] [address]        Dear Colleague,    Thank you for referring your patient, Salvatore Kim, to the [facility] [place]. Please see a copy of my visit note below.      Salvatore Kim  :  1970  DOS: 2021  MRN: [id number]    Sports Medicine Clinic Visit    PCP: Nghia Momin    Salvatore Kim is a 51 year old Right hand dominant male who is seen as a self referral presenting with left long finger triggering.  He states it has been present for about 3-4 weeks.   Feels he is unable to flex his finger.  States there was 2 weeks he could not move his finger at all     Injury: Reports insidious onset without acute precipitating event that patient first noticed approximately 3-4 week(s).  Pain located over left long finger , nonradiating.   Additional Features:  Positive: swelling, catching and locking.  Symptoms are better with Nothing.  Symptoms are worse with: flexion, use .  Other evaluation and/or treatments so far consists of: Nothing.  Recent imaging completed: No recent imaging completed.  Prior History of related problems: trigger finger on other hand injected in Feburary     Social History: currently employed as [employer], [employer]    Interim History - December 10, 2021  Since last visit on 2021 patient has used the brace occasionally, this did not help, he would like to proceed with a TF injection today.  No interim injury.         Review of Systems  Musculoskeletal: as above  Remainder of review of systems is negative including constitutional, CV, pulmonary, GI, Skin and Neurologic except as noted in HPI or medical history.    Past Medical History:   Diagnosis Date     Allergies      Arthritis      Depression, anxiety      ED (erectile dysfunction)      Hyperlipidemia      Metabolic syndrome      Trigger finger, right      Past Surgical History:   Procedure Laterality Date      "COLONOSCOPY  6/7/2013    Procedure: COLONOSCOPY;  COLONOSCOPY, EXAM UNDER ANESTHESIA,  FISTULOTOMY;  Surgeon: Maxwell Israel MD;  Location: [facility] [place]     EXAM UNDER ANESTHESIA ANUS  6/7/2013    Procedure: EXAM UNDER ANESTHESIA ANUS;;  Surgeon: Maxwell Israel MD;  Location: [facility] [place]     FISTULOTOMY RECTUM  6/7/2013    Procedure: FISTULOTOMY RECTUM;;  Surgeon: Maxwell Israel MD;  Location: [facility] [place]     HEMORRHOID SURGERY       RELEASE CARPAL TUNNEL  2001    Rt     ROTATOR CUFF REPAIR RT/LT Right 2017     Family History   Problem Relation Age of Onset     Myocardial Infarction Father 45     Heart Disease Father      C.A.D. Father      Myocardial Infarction Paternal Grandfather 45     Myocardial Infarction Maternal Grandfather 45     Cancer Mother      Diabetes Paternal Grandmother      Diabetes Paternal Aunt      Cancer - colorectal No family hx of      Prostate Cancer No family hx of      Hypertension No family hx of      Cerebrovascular Disease No family hx of      Thyroid Disease No family hx of      Glaucoma No family hx of      Macular Degeneration No family hx of      Objective  /64   Pulse 50   Ht 1.727 m (5' 8\")   Wt 114.6 kg (252 lb 9.6 oz)   BMI 38.41 kg/m        General: healthy, alert and in no distress      HEENT: no scleral icterus or conjunctival erythema     Skin: no suspicious lesions or rash. No jaundice.     CV: regular rhythm by palpation, 2+ distal pulses, no pedal edema      Resp: normal respiratory effort without conversational dyspnea     Psych: normal mood and affect      Gait: nonantalgic, appropriate coordination and balance     Neuro: normal light touch sensory exam of the extremities. Motor strength as noted below     Bilateral hand exam  Inspection: Left Long Finger:  slight swelling, over MCP, flexor tendon nodule  Tender: Left Long Finger:   MCP joint, triggering, A1 Pulley  Non-tender: remainder of hand  Range of Motion left Long Finger:   Painful full extension, triggering of A1 " pulley with flexion  Strength: full strength    Radiology:  No formal imaging needed today    Assessment:  1. Trigger middle finger of left hand        Plan:  Discussed the assessment with the patient.  Follow up: As needed based on clinical progress  Good relief from prior right third digit A1 pulley tendon sheath injection, symptoms in that digit and the fourth digit in the right hand starting to very mildly recur  Left 3rd digit A1 pulley tendon sheath CSI today  Oval 8 splints provided today for left and right hands to prevent triggering and promote rest of the tendon  Reviewed the option for left third digit A1 pulley tendon sheath injection, he would like to defer for now until after his upcoming bariatric surgery  Activity modification strategies and safe over-the-counter medications like oral Tylenol and topical Voltaren gel reviewed  Expectations and goals of CSI reviewed  Often 2-3 days for steroid effect, and can take up to two weeks for maximum effect  We discussed modified progressive pain-free activity as tolerated  Do not overuse in first two weeks if feeling better due to concern for vulnerability while steroid is working  Supportive care reviewed  All questions were answered today  Contact us with additional questions or concerns  Signs and sx of concern reviewed      Behzad Mclaughlin DO, MARY  Sports Medicine Physician  [facility]    Hand / Upper Extremity Injection/Arthrocentesis: L long A1    Date/Time: 12/10/2021 4:03 PM  Performed by: Behzad Mclaughlin DO  Authorized by: Behzad Mclaughlin DO     Indications:  Pain  Needle Size:  25 G  Guidance: landmark    Approach:  Volar  Condition: trigger finger    Location:  Long finger    Site:  L long A1  Medications:  20 mg triamcinolone 40 MG/ML; 0.5 mL lidocaine 1 %  Outcome:  Tolerated well, no immediate complications  Procedure discussed: discussed risks, benefits, and alternatives    Consent Given by:   Patient  Timeout: timeout called immediately prior to procedure    Prep: patient was prepped and draped in usual sterile fashion                Disclaimer: This note consists of symbols derived from keyboarding, dictation and/or voice recognition software. As a result, there may be errors in the script that have gone undetected. Please consider this when interpreting information found in this chart.          Again, thank you for allowing me to participate in the care of your patient.        Sincerely,        Behzad Mclaughlin, DO

## 2021-12-10 NOTE — PROGRESS NOTES
Salvatore Kim  :  1970  DOS: 2021  MRN: [id number]    Sports Medicine Clinic Visit    PCP: Nghia Momin    Salvatore Kim is a 51 year old Right hand dominant male who is seen as a self referral presenting with left long finger triggering.  He states it has been present for about 3-4 weeks.   Feels he is unable to flex his finger.  States there was 2 weeks he could not move his finger at all     Injury: Reports insidious onset without acute precipitating event that patient first noticed approximately 3-4 week(s).  Pain located over left long finger , nonradiating.   Additional Features:  Positive: swelling, catching and locking.  Symptoms are better with Nothing.  Symptoms are worse with: flexion, use .  Other evaluation and/or treatments so far consists of: Nothing.  Recent imaging completed: No recent imaging completed.  Prior History of related problems: trigger finger on other hand injected in [facility]     Social History: currently employed as [employer], [employer]    Interim History - December 10, 2021  Since last visit on 2021 patient has used the brace occasionally, this did not help, he would like to proceed with a TF injection today.  No interim injury.         Review of Systems  Musculoskeletal: as above  Remainder of review of systems is negative including constitutional, CV, pulmonary, GI, Skin and Neurologic except as noted in HPI or medical history.    Past Medical History:   Diagnosis Date     Allergies      Arthritis      Depression, anxiety      ED (erectile dysfunction)      Hyperlipidemia      Metabolic syndrome      Trigger finger, right      Past Surgical History:   Procedure Laterality Date     COLONOSCOPY  2013    Procedure: COLONOSCOPY;  COLONOSCOPY, EXAM UNDER ANESTHESIA,  FISTULOTOMY;  Surgeon: Maxwell Israel MD;  Location: [facility]     EXAM UNDER ANESTHESIA ANUS  2013    Procedure: EXAM UNDER ANESTHESIA ANUS;;  Surgeon: Maxwell Israel MD;   "Location:  [facility]     FISTULOTOMY RECTUM  6/7/2013    Procedure: FISTULOTOMY RECTUM;;  Surgeon: Maxwell Israel MD;  Location: [facility]     HEMORRHOID SURGERY       RELEASE CARPAL TUNNEL  2001    Rt     ROTATOR CUFF REPAIR RT/LT Right 2017     Family History   Problem Relation Age of Onset     Myocardial Infarction Father 45     Heart Disease Father      C.A.D. Father      Myocardial Infarction Paternal Grandfather 45     Myocardial Infarction Maternal Grandfather 45     Cancer Mother      Diabetes Paternal Grandmother      Diabetes Paternal Aunt      Cancer - colorectal No family hx of      Prostate Cancer No family hx of      Hypertension No family hx of      Cerebrovascular Disease No family hx of      Thyroid Disease No family hx of      Glaucoma No family hx of      Macular Degeneration No family hx of      Objective  /64   Pulse 50   Ht 1.727 m (5' 8\")   Wt 114.6 kg (252 lb 9.6 oz)   BMI 38.41 kg/m        General: healthy, alert and in no distress      HEENT: no scleral icterus or conjunctival erythema     Skin: no suspicious lesions or rash. No jaundice.     CV: regular rhythm by palpation, 2+ distal pulses, no pedal edema      Resp: normal respiratory effort without conversational dyspnea     Psych: normal mood and affect      Gait: nonantalgic, appropriate coordination and balance     Neuro: normal light touch sensory exam of the extremities. Motor strength as noted below     Bilateral hand exam  Inspection: Left Long Finger:  slight swelling, over MCP, flexor tendon nodule  Tender: Left Long Finger:   MCP joint, triggering, A1 Pulley  Non-tender: remainder of hand  Range of Motion left Long Finger:   Painful full extension, triggering of A1 pulley with flexion  Strength: full strength    Radiology:  No formal imaging needed today    Assessment:  1. Trigger middle finger of left hand        Plan:  Discussed the assessment with the patient.  Follow up: As needed based on clinical progress  Good relief " from prior right third digit A1 pulley tendon sheath injection, symptoms in that digit and the fourth digit in the right hand starting to very mildly recur  Left 3rd digit A1 pulley tendon sheath CSI today  Oval 8 splints provided today for left and right hands to prevent triggering and promote rest of the tendon  Reviewed the option for left third digit A1 pulley tendon sheath injection, he would like to defer for now until after his upcoming bariatric surgery  Activity modification strategies and safe over-the-counter medications like oral Tylenol and topical Voltaren gel reviewed  Expectations and goals of CSI reviewed  Often 2-3 days for steroid effect, and can take up to two weeks for maximum effect  We discussed modified progressive pain-free activity as tolerated  Do not overuse in first two weeks if feeling better due to concern for vulnerability while steroid is working  Supportive care reviewed  All questions were answered today  Contact us with additional questions or concerns  Signs and sx of concern reviewed      Behzad Mclaughlin DO, CAQ  Sports Medicine Physician  [facility]    Hand / Upper Extremity Injection/Arthrocentesis: L long A1    Date/Time: 12/10/2021 4:03 PM  Performed by: Behzad Mclaughlin DO  Authorized by: Behzad Mclaughlin DO     Indications:  Pain  Needle Size:  25 G  Guidance: landmark    Approach:  Volar  Condition: trigger finger    Location:  Long finger    Site:  L long A1  Medications:  20 mg triamcinolone 40 MG/ML; 0.5 mL lidocaine 1 %  Outcome:  Tolerated well, no immediate complications  Procedure discussed: discussed risks, benefits, and alternatives    Consent Given by:  Patient  Timeout: timeout called immediately prior to procedure    Prep: patient was prepped and draped in usual sterile fashion                Disclaimer: This note consists of symbols derived from keyboarding, dictation and/or voice recognition software. As a  result, there may be errors in the script that have gone undetected. Please consider this when interpreting information found in this chart.

## 2022-02-20 ENCOUNTER — HEALTH MAINTENANCE LETTER (OUTPATIENT)
Age: 52
End: 2022-02-20

## 2022-05-17 NOTE — PROGRESS NOTES
Salvatore Kim  :  1970  DOS: 2022  MRN: [id number]    Sports Medicine Clinic Visit    PCP: Nghia Momin    Salvatore Kim is a 51 year old Right hand dominant male who is seen as a self referral presenting with left long finger triggering.  He states it has been present for about 3-4 weeks.   Feels he is unable to flex his finger.  States there was 2 weeks he could not move his finger at all     Injury: Reports insidious onset without acute precipitating event that patient first noticed approximately 3-4 week(s).  Pain located over left long finger , nonradiating.   Additional Features:  Positive: swelling, catching and locking.  Symptoms are better with Nothing.  Symptoms are worse with: flexion, use .  Other evaluation and/or treatments so far consists of: Nothing.  Recent imaging completed: No recent imaging completed.  Prior History of related problems: trigger finger on other hand injected in [facility]     Social History: currently employed as [employer], [employer]    Interim History - December 10, 2021  Since last visit on 2021 patient has used the brace occasionally, this did not help, he would like to proceed with a TF injection today.  No interim injury.       Interim History - May 20, 2022  Since last visit on 12/10/2021 patient has noticed an increase in right long finger triggering. Trigger finger right long finger injection completed on 2021 provided moderate relief in triggering and has been waxing and waning in symptoms since. Over the past week, right long finger has been triggering and is extremely painful. Is interested in a repeat corticosteroid injection today. No new injury in the interim.    Review of Systems  Musculoskeletal: as above  Remainder of review of systems is negative including constitutional, CV, pulmonary, GI, Skin and Neurologic except as noted in HPI or medical history.    Past Medical History:   Diagnosis Date     Allergies      Arthritis       Depression, anxiety      ED (erectile dysfunction)      Hyperlipidemia      Metabolic syndrome      Trigger finger, right      Past Surgical History:   Procedure Laterality Date     COLONOSCOPY  6/7/2013    Procedure: COLONOSCOPY;  COLONOSCOPY, EXAM UNDER ANESTHESIA,  FISTULOTOMY;  Surgeon: Maxwell Israel MD;  Location: [facility]     EXAM UNDER ANESTHESIA ANUS  6/7/2013    Procedure: EXAM UNDER ANESTHESIA ANUS;;  Surgeon: Maxwell Israel MD;  Location: [facility]     FISTULOTOMY RECTUM  6/7/2013    Procedure: FISTULOTOMY RECTUM;;  Surgeon: Maxwell Israel MD;  Location: [facility]     HEMORRHOID SURGERY       RELEASE CARPAL TUNNEL  2001    Rt     ROTATOR CUFF REPAIR RT/LT Right 2017     Family History   Problem Relation Age of Onset     Myocardial Infarction Father 45     Heart Disease Father      C.A.D. Father      Myocardial Infarction Paternal Grandfather 45     Myocardial Infarction Maternal Grandfather 45     Cancer Mother      Diabetes Paternal Grandmother      Diabetes Paternal Aunt      Cancer - colorectal No family hx of      Prostate Cancer No family hx of      Hypertension No family hx of      Cerebrovascular Disease No family hx of      Thyroid Disease No family hx of      Glaucoma No family hx of      Macular Degeneration No family hx of      Objective  /73   Wt 115.2 kg (254 lb)   BMI 38.62 kg/m        General: healthy, alert and in no distress      HEENT: no scleral icterus or conjunctival erythema     Skin: no suspicious lesions or rash. No jaundice.     CV: regular rhythm by palpation, 2+ distal pulses, no pedal edema      Resp: normal respiratory effort without conversational dyspnea     Psych: normal mood and affect      Gait: nonantalgic, appropriate coordination and balance     Neuro: normal light touch sensory exam of the extremities. Motor strength as noted below     Bilateral hand exam  Inspection: Right Long Finger:  slight swelling, over MCP, flexor tendon nodule  Tender: Right Long  Finger:   MCP joint, triggering, A1 Pulley  Non-tender: remainder of hand  Range of Motion Right Long Finger:   Painful full extension, triggering of A1 pulley with flexion  Strength: full strength    Radiology:  No formal imaging needed today    Assessment:  1. Trigger middle finger of right hand        Plan:  Discussed the assessment with the patient.  Follow up: As needed based on clinical progress  Good relief from prior right third digit A1 pulley tendon sheath injection, symptoms have recurred  Repeat right 3rd digit A1 pulley tendon sheath CSI today  Oval 8 splints provided for left and right hands to prevent triggering and promote rest of the tendon  Orthopedic surgery referral available in the future prn based on progress, cannot inject right 3rd digit again, has had 2 injections into that tendon sheath  Activity modification strategies and safe over-the-counter medications like oral Tylenol and topical Voltaren gel reviewed  Expectations and goals of CSI reviewed  Often 2-3 days for steroid effect, and can take up to two weeks for maximum effect  We discussed modified progressive pain-free activity as tolerated  Do not overuse in first two weeks if feeling better due to concern for vulnerability while steroid is working  Supportive care reviewed  All questions were answered today  Contact us with additional questions or concerns  Signs and sx of concern reviewed      Behzad Mclaughlin DO, MARY  Sports Medicine Physician  [facility]    Hand / Upper Extremity Injection/Arthrocentesis: R long A1    Date/Time: 5/20/2022 4:00 PM  Performed by: Behzad Mclaughlin DO  Authorized by: Behzad Mclaughlin DO     Indications:  Pain and therapeutic  Needle Size:  25 G  Guidance: landmark    Approach:  Volar  Condition: trigger finger    Location:  Long finger    Site:  R long A1  Medications:  0.5 mL lidocaine 1 %; 20 mg triamcinolone 40 MG/ML  Outcome:  Tolerated well, no  immediate complications  Procedure discussed: discussed risks, benefits, and alternatives    Consent Given by:  Patient  Timeout: timeout called immediately prior to procedure    Prep: patient was prepped and draped in usual sterile fashion                     Disclaimer: This note consists of symbols derived from keyboarding, dictation and/or voice recognition software. As a result, there may be errors in the script that have gone undetected. Please consider this when interpreting information found in this chart.

## 2022-05-20 ENCOUNTER — OFFICE VISIT (OUTPATIENT)
Dept: ORTHOPEDICS | Facility: CLINIC | Age: 52
End: 2022-05-20
Payer: COMMERCIAL

## 2022-05-20 VITALS — DIASTOLIC BLOOD PRESSURE: 73 MMHG | WEIGHT: 254 LBS | BODY MASS INDEX: 38.62 KG/M2 | SYSTOLIC BLOOD PRESSURE: 118 MMHG

## 2022-05-20 DIAGNOSIS — M65.331 TRIGGER MIDDLE FINGER OF RIGHT HAND: Primary | ICD-10-CM

## 2022-05-20 PROCEDURE — 99213 OFFICE O/P EST LOW 20 MIN: CPT | Mod: 25 | Performed by: FAMILY MEDICINE

## 2022-05-20 PROCEDURE — 20550 NJX 1 TENDON SHEATH/LIGAMENT: CPT | Mod: RT | Performed by: FAMILY MEDICINE

## 2022-05-20 RX ADMIN — LIDOCAINE HYDROCHLORIDE 0.5 ML: 10 INJECTION, SOLUTION INFILTRATION; PERINEURAL at 16:00

## 2022-05-20 RX ADMIN — TRIAMCINOLONE ACETONIDE 20 MG: 40 INJECTION, SUSPENSION INTRA-ARTICULAR; INTRAMUSCULAR at 16:00

## 2022-05-20 ASSESSMENT — PAIN SCALES - GENERAL: PAINLEVEL: SEVERE PAIN (6)

## 2022-05-20 NOTE — LETTER
2022         RE: Salvatore Kim  [address]  [place] [address]        Dear Colleague,    Thank you for referring your patient, Salvatore Kim, to the [facility] [place]. Please see a copy of my visit note below.      Salvatore Kim  :  1970  DOS: 2022  MRN: [id number]    Sports Medicine Clinic Visit    PCP: Nghia Momin    Salvatore Kim is a 51 year old Right hand dominant male who is seen as a self referral presenting with left long finger triggering.  He states it has been present for about 3-4 weeks.   Feels he is unable to flex his finger.  States there was 2 weeks he could not move his finger at all     Injury: Reports insidious onset without acute precipitating event that patient first noticed approximately 3-4 week(s).  Pain located over left long finger , nonradiating.   Additional Features:  Positive: swelling, catching and locking.  Symptoms are better with Nothing.  Symptoms are worse with: flexion, use .  Other evaluation and/or treatments so far consists of: Nothing.  Recent imaging completed: No recent imaging completed.  Prior History of related problems: trigger finger on other hand injected in [facility]     Social History: currently employed as [employer]    Interim History - December 10, 2021  Since last visit on 2021 patient has used the brace occasionally, this did not help, he would like to proceed with a TF injection today.  No interim injury.       Interim History - May 20, 2022  Since last visit on 12/10/2021 patient has noticed an increase in right long finger triggering. Trigger finger right long finger injection completed on 2021 provided moderate relief in triggering and has been waxing and waning in symptoms since. Over the past week, right long finger has been triggering and is extremely painful. Is interested in a repeat corticosteroid injection today. No new injury in the interim.    Review  of Systems  Musculoskeletal: as above  Remainder of review of systems is negative including constitutional, CV, pulmonary, GI, Skin and Neurologic except as noted in HPI or medical history.    Past Medical History:   Diagnosis Date     Allergies      Arthritis      Depression, anxiety      ED (erectile dysfunction)      Hyperlipidemia      Metabolic syndrome      Trigger finger, right      Past Surgical History:   Procedure Laterality Date     COLONOSCOPY  6/7/2013    Procedure: COLONOSCOPY;  COLONOSCOPY, EXAM UNDER ANESTHESIA,  FISTULOTOMY;  Surgeon: Maxwell Israel MD;  Location: [facility]     EXAM UNDER ANESTHESIA ANUS  6/7/2013    Procedure: EXAM UNDER ANESTHESIA ANUS;;  Surgeon: Maxwell Israel MD;  Location: [facility]     FISTULOTOMY RECTUM  6/7/2013    Procedure: FISTULOTOMY RECTUM;;  Surgeon: Maxwell Israel MD;  Location: [facility]     HEMORRHOID SURGERY       RELEASE CARPAL TUNNEL  2001    Rt     ROTATOR CUFF REPAIR RT/LT Right 2017     Family History   Problem Relation Age of Onset     Myocardial Infarction Father 45     Heart Disease Father      C.A.D. Father      Myocardial Infarction Paternal Grandfather 45     Myocardial Infarction Maternal Grandfather 45     Cancer Mother      Diabetes Paternal Grandmother      Diabetes Paternal Aunt      Cancer - colorectal No family hx of      Prostate Cancer No family hx of      Hypertension No family hx of      Cerebrovascular Disease No family hx of      Thyroid Disease No family hx of      Glaucoma No family hx of      Macular Degeneration No family hx of      Objective  /73   Wt 115.2 kg (254 lb)   BMI 38.62 kg/m        General: healthy, alert and in no distress      HEENT: no scleral icterus or conjunctival erythema     Skin: no suspicious lesions or rash. No jaundice.     CV: regular rhythm by palpation, 2+ distal pulses, no pedal edema      Resp: normal respiratory effort without conversational dyspnea     Psych: normal mood and affect      Gait:  nonantalgic, appropriate coordination and balance     Neuro: normal light touch sensory exam of the extremities. Motor strength as noted below     Bilateral hand exam  Inspection: Right Long Finger:  slight swelling, over MCP, flexor tendon nodule  Tender: Right Long Finger:   MCP joint, triggering, A1 Pulley  Non-tender: remainder of hand  Range of Motion Right Long Finger:   Painful full extension, triggering of A1 pulley with flexion  Strength: full strength    Radiology:  No formal imaging needed today    Assessment:  1. Trigger middle finger of right hand        Plan:  Discussed the assessment with the patient.  Follow up: As needed based on clinical progress  Good relief from prior right third digit A1 pulley tendon sheath injection, symptoms have recurred  Repeat right 3rd digit A1 pulley tendon sheath CSI today  Oval 8 splints provided for left and right hands to prevent triggering and promote rest of the tendon  Orthopedic surgery referral available in the future prn based on progress, cannot inject right 3rd digit again, has had 2 injections into that tendon sheath  Activity modification strategies and safe over-the-counter medications like oral Tylenol and topical Voltaren gel reviewed  Expectations and goals of CSI reviewed  Often 2-3 days for steroid effect, and can take up to two weeks for maximum effect  We discussed modified progressive pain-free activity as tolerated  Do not overuse in first two weeks if feeling better due to concern for vulnerability while steroid is working  Supportive care reviewed  All questions were answered today  Contact us with additional questions or concerns  Signs and sx of concern reviewed      Behzad Mclaughlin DO, MARY  Sports Medicine Physician  [facility]    Hand / Upper Extremity Injection/Arthrocentesis: R long A1    Date/Time: 5/20/2022 4:00 PM  Performed by: Behzad Mclaughlin DO  Authorized by: Behzad Mclaughlin DO      Indications:  Pain and therapeutic  Needle Size:  25 G  Guidance: landmark    Approach:  Volar  Condition: trigger finger    Location:  Long finger    Site:  R long A1  Medications:  0.5 mL lidocaine 1 %; 20 mg triamcinolone 40 MG/ML  Outcome:  Tolerated well, no immediate complications  Procedure discussed: discussed risks, benefits, and alternatives    Consent Given by:  Patient  Timeout: timeout called immediately prior to procedure    Prep: patient was prepped and draped in usual sterile fashion                     Disclaimer: This note consists of symbols derived from keyboarding, dictation and/or voice recognition software. As a result, there may be errors in the script that have gone undetected. Please consider this when interpreting information found in this chart.          Again, thank you for allowing me to participate in the care of your patient.        Sincerely,        Behzad Mclaughlin, DO

## 2022-05-24 RX ORDER — TRIAMCINOLONE ACETONIDE 40 MG/ML
20 INJECTION, SUSPENSION INTRA-ARTICULAR; INTRAMUSCULAR
Status: DISCONTINUED | OUTPATIENT
Start: 2022-05-20 | End: 2023-02-06

## 2022-05-24 RX ORDER — LIDOCAINE HYDROCHLORIDE 10 MG/ML
0.5 INJECTION, SOLUTION INFILTRATION; PERINEURAL
Status: DISCONTINUED | OUTPATIENT
Start: 2022-05-20 | End: 2023-02-06

## 2022-10-23 ENCOUNTER — HEALTH MAINTENANCE LETTER (OUTPATIENT)
Age: 52
End: 2022-10-23

## 2022-11-04 ENCOUNTER — ANCILLARY PROCEDURE (OUTPATIENT)
Dept: GENERAL RADIOLOGY | Facility: CLINIC | Age: 52
End: 2022-11-04
Attending: PHYSICIAN ASSISTANT
Payer: COMMERCIAL

## 2022-11-04 ENCOUNTER — OFFICE VISIT (OUTPATIENT)
Dept: URGENT CARE | Facility: URGENT CARE | Age: 52
End: 2022-11-04
Payer: COMMERCIAL

## 2022-11-04 VITALS
RESPIRATION RATE: 18 BRPM | DIASTOLIC BLOOD PRESSURE: 80 MMHG | SYSTOLIC BLOOD PRESSURE: 123 MMHG | TEMPERATURE: 97.9 F | OXYGEN SATURATION: 96 % | HEART RATE: 64 BPM

## 2022-11-04 DIAGNOSIS — R05.1 ACUTE COUGH: ICD-10-CM

## 2022-11-04 DIAGNOSIS — R07.0 THROAT PAIN: Primary | ICD-10-CM

## 2022-11-04 DIAGNOSIS — J40 BRONCHITIS: ICD-10-CM

## 2022-11-04 LAB
BASOPHILS # BLD AUTO: 0 10E3/UL (ref 0–0.2)
BASOPHILS NFR BLD AUTO: 0 %
DEPRECATED S PYO AG THROAT QL EIA: NEGATIVE
EOSINOPHIL # BLD AUTO: 0.2 10E3/UL (ref 0–0.7)
EOSINOPHIL NFR BLD AUTO: 2 %
ERYTHROCYTE [DISTWIDTH] IN BLOOD BY AUTOMATED COUNT: 14 % (ref 10–15)
GROUP A STREP BY PCR: NOT DETECTED
HCT VFR BLD AUTO: 44.6 % (ref 40–53)
HGB BLD-MCNC: 14.6 G/DL (ref 13.3–17.7)
LYMPHOCYTES # BLD AUTO: 2.6 10E3/UL (ref 0.8–5.3)
LYMPHOCYTES NFR BLD AUTO: 26 %
MCH RBC QN AUTO: 29.3 PG (ref 26.5–33)
MCHC RBC AUTO-ENTMCNC: 32.7 G/DL (ref 31.5–36.5)
MCV RBC AUTO: 89 FL (ref 78–100)
MONOCYTES # BLD AUTO: 0.6 10E3/UL (ref 0–1.3)
MONOCYTES NFR BLD AUTO: 6 %
NEUTROPHILS # BLD AUTO: 6.3 10E3/UL (ref 1.6–8.3)
NEUTROPHILS NFR BLD AUTO: 65 %
PLATELET # BLD AUTO: 194 10E3/UL (ref 150–450)
RBC # BLD AUTO: 4.99 10E6/UL (ref 4.4–5.9)
WBC # BLD AUTO: 9.7 10E3/UL (ref 4–11)

## 2022-11-04 PROCEDURE — 85025 COMPLETE CBC W/AUTO DIFF WBC: CPT | Performed by: PHYSICIAN ASSISTANT

## 2022-11-04 PROCEDURE — 87651 STREP A DNA AMP PROBE: CPT | Performed by: PHYSICIAN ASSISTANT

## 2022-11-04 PROCEDURE — 71046 X-RAY EXAM CHEST 2 VIEWS: CPT | Mod: TC | Performed by: RADIOLOGY

## 2022-11-04 PROCEDURE — 36415 COLL VENOUS BLD VENIPUNCTURE: CPT | Performed by: PHYSICIAN ASSISTANT

## 2022-11-04 PROCEDURE — 99214 OFFICE O/P EST MOD 30 MIN: CPT | Performed by: PHYSICIAN ASSISTANT

## 2022-11-04 RX ORDER — BENZONATATE 200 MG/1
200 CAPSULE ORAL 3 TIMES DAILY PRN
Qty: 25 CAPSULE | Refills: 0 | Status: SHIPPED | OUTPATIENT
Start: 2022-11-04 | End: 2024-01-17

## 2022-11-04 ASSESSMENT — ENCOUNTER SYMPTOMS
COUGH: 1
DIARRHEA: 0
RHINORRHEA: 1
MYALGIAS: 0
SORE THROAT: 1
VOMITING: 0
FATIGUE: 1
FEVER: 0
NAUSEA: 0
APPETITE CHANGE: 0
HEADACHES: 1

## 2022-11-04 NOTE — PROGRESS NOTES
SUBJECTIVE:   Salvatore Kim is a 52 year old male presenting with a chief complaint of   Chief Complaint   Patient presents with     Urgent Care     Throat Pain     Per patient states symptoms started last week Friday throat pain, productive cough, fatigued/run down, mucus and lethargic. Would like to rule out strep       He is an established patient of [facility].  Patient presents with ST, cough, productive, clear runny nose.  Negative covid test.      Treatment:  Nothing.  Took niquil a few times       Review of Systems   Constitutional: Positive for fatigue. Negative for appetite change and fever.   HENT: Positive for congestion, rhinorrhea and sore throat. Negative for ear pain.    Respiratory: Positive for cough.    Gastrointestinal: Negative for diarrhea, nausea and vomiting.   Musculoskeletal: Negative for myalgias.   Skin: Negative for rash.   Neurological: Positive for headaches.   All other systems reviewed and are negative.      Past Medical History:   Diagnosis Date     Allergies      Arthritis      Depression, anxiety      ED (erectile dysfunction)      Hyperlipidemia      Metabolic syndrome      Trigger finger, right      Family History   Problem Relation Age of Onset     Myocardial Infarction Father 45     Heart Disease Father      C.A.D. Father      Myocardial Infarction Paternal Grandfather 45     Myocardial Infarction Maternal Grandfather 45     Cancer Mother      Diabetes Paternal Grandmother      Diabetes Paternal Aunt      Cancer - colorectal No family hx of      Prostate Cancer No family hx of      Hypertension No family hx of      Cerebrovascular Disease No family hx of      Thyroid Disease No family hx of      Glaucoma No family hx of      Macular Degeneration No family hx of      Current Outpatient Medications   Medication Sig Dispense Refill     benzonatate (TESSALON) 200 MG capsule Take 1 capsule (200 mg) by mouth 3 times daily as needed for cough 25 capsule 0     clomiPHENE (CLOMID) 50  MG tablet Take 50 mg by mouth daily       EPINEPHrine (EPIPEN/ADRENACLICK/OR ANY BX GENERIC EQUIV) 0.3 MG/0.3ML injection 2-pack Inject 0.3 mLs (0.3 mg) into the muscle once as needed for anaphylaxis 2 mL 0     sildenafil (VIAGRA) 100 MG tablet Take 1 tablet (100 mg) by mouth daily as needed (ED) 10 tablet 3     ibuprofen (ADVIL/MOTRIN) 600 MG tablet Take 1 tablet (600 mg) by mouth every 6 hours as needed for moderate pain 30 tablet 1     sulfamethoxazole-trimethoprim (BACTRIM DS) 800-160 MG tablet Take 1 tablet by mouth 2 times daily (Patient not taking: Reported on 6/4/2021) 20 tablet 1     Social History     Tobacco Use     Smoking status: Former     Packs/day: 1.50     Years: 25.00     Pack years: 37.50     Types: Cigarettes     Smokeless tobacco: Never     Tobacco comments:     quit 3 weeks ago.   Substance Use Topics     Alcohol use: Yes     Alcohol/week: 3.3 standard drinks     Types: 4 Standard drinks or equivalent per week     Comment: RARE, socially        OBJECTIVE  /80   Pulse 64   Temp 97.9  F (36.6  C) (Tympanic)   Resp 18   SpO2 96%     Physical Exam  Vitals and nursing note reviewed.   Constitutional:       Appearance: Normal appearance. He is normal weight.   HENT:      Head: Normocephalic and atraumatic.      Right Ear: Tympanic membrane, ear canal and external ear normal.      Left Ear: Tympanic membrane, ear canal and external ear normal.      Nose: Nose normal.      Mouth/Throat:      Mouth: Mucous membranes are moist.      Pharynx: Oropharynx is clear. Posterior oropharyngeal erythema present.   Eyes:      Extraocular Movements: Extraocular movements intact.      Conjunctiva/sclera: Conjunctivae normal.   Cardiovascular:      Rate and Rhythm: Normal rate and regular rhythm.      Pulses: Normal pulses.      Heart sounds: Normal heart sounds.   Pulmonary:      Effort: Pulmonary effort is normal.      Breath sounds: Normal breath sounds.   Musculoskeletal:      Cervical back: Normal  range of motion and neck supple. No rigidity. No muscular tenderness.   Skin:     General: Skin is warm and dry.   Neurological:      General: No focal deficit present.      Mental Status: He is alert.   Psychiatric:         Mood and Affect: Mood normal.         Behavior: Behavior normal.         Labs:  Results for orders placed or performed in visit on 11/04/22 (from the past 24 hour(s))   Streptococcus A Rapid Screen w/Reflex to PCR - Clinic Collect    Specimen: Throat; Swab   Result Value Ref Range    Group A Strep antigen Negative Negative   CBC with platelets and differential    Narrative    The following orders were created for panel order CBC with platelets and differential.  Procedure                               Abnormality         Status                     ---------                               -----------         ------                     CBC with platelets and d...[[id number]]                      Final result                 Please view results for these tests on the individual orders.   CBC with platelets and differential   Result Value Ref Range    WBC Count 9.7 4.0 - 11.0 10e3/uL    RBC Count 4.99 4.40 - 5.90 10e6/uL    Hemoglobin 14.6 13.3 - 17.7 g/dL    Hematocrit 44.6 40.0 - 53.0 %    MCV 89 78 - 100 fL    MCH 29.3 26.5 - 33.0 pg    MCHC 32.7 31.5 - 36.5 g/dL    RDW 14.0 10.0 - 15.0 %    Platelet Count 194 150 - 450 10e3/uL    % Neutrophils 65 %    % Lymphocytes 26 %    % Monocytes 6 %    % Eosinophils 2 %    % Basophils 0 %    Absolute Neutrophils 6.3 1.6 - 8.3 10e3/uL    Absolute Lymphocytes 2.6 0.8 - 5.3 10e3/uL    Absolute Monocytes 0.6 0.0 - 1.3 10e3/uL    Absolute Eosinophils 0.2 0.0 - 0.7 10e3/uL    Absolute Basophils 0.0 0.0 - 0.2 10e3/uL       X-Ray was done.  NAD    ASSESSMENT:      ICD-10-CM    1. Throat pain  R07.0 Streptococcus A Rapid Screen w/Reflex to PCR - Clinic Collect     Group A Streptococcus PCR Throat Swab      2. Acute cough  R05.1 XR Chest 2 Views     CBC with platelets  and differential     CBC with platelets and differential      3. Bronchitis  J40 benzonatate (TESSALON) 200 MG capsule           Medical Decision Making:    Differential Diagnosis:  URI Adult/Peds:  Bronchitis-viral, Strep pharyngitis, Viral pharyngitis and Viral upper respiratory illness    Serious Comorbid Conditions:  Adult:  reviewed    PLAN:    Tylenol/motrin as needed.  Drink plenty of water.  Gargle with salt water.  May use chloraseptic spray as directed for ST.  Strep pcr pending.    Rx for tessalon perles.  Patient education.  Discussed reasons to seek immediate medical attention.  Additionally if no improvement or worsening in one week, may follow up with PCP and/or UC.        Followup:    If not improving or if condition worsens, follow up with your Primary Care Provider, If not improving or if conditions worsens over the next 12-24 hours, go to the Emergency Department    There are no Patient Instructions on file for this visit.

## 2022-11-14 ENCOUNTER — ANCILLARY PROCEDURE (OUTPATIENT)
Dept: GENERAL RADIOLOGY | Facility: CLINIC | Age: 52
End: 2022-11-14
Attending: PEDIATRICS
Payer: COMMERCIAL

## 2022-11-14 ENCOUNTER — OFFICE VISIT (OUTPATIENT)
Dept: ORTHOPEDICS | Facility: CLINIC | Age: 52
End: 2022-11-14
Payer: OTHER MISCELLANEOUS

## 2022-11-14 VITALS
BODY MASS INDEX: 40.92 KG/M2 | HEIGHT: 68 IN | SYSTOLIC BLOOD PRESSURE: 124 MMHG | DIASTOLIC BLOOD PRESSURE: 72 MMHG | WEIGHT: 270 LBS

## 2022-11-14 DIAGNOSIS — S89.91XA INJURY OF RIGHT KNEE, INITIAL ENCOUNTER: Primary | ICD-10-CM

## 2022-11-14 DIAGNOSIS — M25.561 ACUTE PAIN OF RIGHT KNEE: ICD-10-CM

## 2022-11-14 PROCEDURE — 73562 X-RAY EXAM OF KNEE 3: CPT | Mod: TC | Performed by: RADIOLOGY

## 2022-11-14 PROCEDURE — 99214 OFFICE O/P EST MOD 30 MIN: CPT | Performed by: PEDIATRICS

## 2022-11-14 NOTE — PATIENT INSTRUCTIONS
Question right knee medial meniscus injury, given nature of the mechanism, as well as ongoing symptoms.  Plan MRI of the right knee next.  In the meantime, icing, rest, over-the-counter medications needed for soreness.  Ambulatory aid such as a cane as needed.  Plan to contact you with MRI results.  In the meantime, provided a letter for work.      Advanced imaging is done by appointment. Please call [phone] to schedule your MRI.     Some insurance companies may require a prior authorization to be completed which can delay the time until you are able to schedule your appointment.       If you are active on [employer], you may have access to your test results before your provider is able to review the study and advise on next steps.      Please make a follow up appointment in the clinic at least 2 days following your MRI by calling [phone].        If you have any further questions for your physician or physician s care team you can contact them thru [employer] or by calling  [phone] and use option 3 to leave a voice message.   Messages received during business hours will be returned same day.

## 2022-11-14 NOTE — PROGRESS NOTES
ASSESSMENT & PLAN    Salvatore was seen today for pain.    Diagnoses and all orders for this visit:    Injury of right knee, initial encounter  -     XR Knee Standing AP [place] Bilat Lat Right; Future  -     MR Knee Right w/o Contrast; Future      Acute injury, undiagnosed new problem, unclear prognosis currently.  We discussed the following: symptom treatment, activity modification/rest, imaging, rehab, injection therapy, support for the affected area and referral to ortho surgery. Following discussion, plan:  MRI next. Primarily rest otherwise, for now.  Given nature of job, ok for off for now, with additional imaging planned. Discussed potential light duty as well, depending on results.  Follow-up after MRI.  Questions answered. Discussed signs and symptoms that may indicate more serious issues; the patient was instructed to seek appropriate care if noted. Salvatore indicates understanding of these issues and agrees with the plan.        See Patient Instructions  Patient Instructions   Question right knee medial meniscus injury, given nature of the mechanism, as well as ongoing symptoms.  Plan MRI of the right knee next.  In the meantime, icing, rest, over-the-counter medications needed for soreness.  Ambulatory aid such as a cane as needed.  Plan to contact you with MRI results.  In the meantime, provided a letter for work.      Advanced imaging is done by appointment. Please call [phone] to schedule your MRI.     Some insurance companies may require a prior authorization to be completed which can delay the time until you are able to schedule your appointment.       If you are active on [employer], you may have access to your test results before your provider is able to review the study and advise on next steps.      Please make a follow up appointment in the clinic at least 2 days following your MRI by calling [phone].        If you have any further questions for your physician or physician s care team you can  "contact them thru MyChart or by calling  [phone] and use option 3 to leave a voice message.   Messages received during business hours will be returned same day.          Behzad Corona DO  [facility] [place]    -----  Chief Complaint   Patient presents with     Right Knee - Pain       SUBJECTIVE  Salvatore Kim is a/an 52 year old male who is seen as a self referral for evaluation of right knee pain.  While stepping off of a ladder while at work he felt a pop and had pain in his right knee.     Pain initially, then he tried to baby it.  Pain increased to the point he was unable to walk.      The patient is seen by themselves.      Onset: 10/12/22 4.5 week(s) ago. Patient describes injury as stepping off ladder, felt and heard a pop and had pain in his knee.   Location of Pain: right medial knee   Worsened by: weight bearing, walking   Better with: nothing   Treatments tried: salon pas, Acetaminophen, use of cane   Associated symptoms: swelling, feeling of instability and popping     Orthopedic/Surgical history: NO  Social History/Occupation:      **  Still gets audible popping in right knee, with pain.  Pain is more anteromedial.  + medial swelling.  Using cane due to right knee issues.  No prior issues with right knee.    Has not returned to work.  For work, has to do long distance walking, also has to kneel and squat, and has to do ladders.      REVIEW OF SYSTEMS:  Review of Systems      OBJECTIVE:  /72   Ht 1.727 m (5' 8\")   Wt 122.5 kg (270 lb)   BMI 41.05 kg/m     General: healthy, alert and in no distress  HEENT: no scleral icterus or conjunctival erythema  Skin: no suspicious lesions or rash. No jaundice.  CV: distal perfusion intact   Resp: normal respiratory effort without conversational dyspnea   Psych: normal mood and affect  Gait: cane and antalgic  Neuro: Normal light sensory exam of extremity       Right Knee exam    Inspection:   mild " effusion   no ecchymosis    ROM:      Full active and passive ROM with flexion and extension  Some medial knee pain with full extension    Patellar Motion:      Normal patellar tracking noted through range of motion    Tender:      medial joint line    Non Tender: remainder    Special Tests:      Pain medially with Vesta       neg (-) Lachman       neg (-) anterior drawer       neg (-) posterior drawer       neg (-) varus       neg (-) valgus       + pain with forced extension          RADIOLOGY:  I independently ordered, visualized and reviewed these images with the patient  Bipartite patellae. No clear acute bony abnormality noted. Joint spaces appear fairly well preserved.    Recent Results (from the past 24 hour(s))   XR Knee Standing AP [place] Bilat Lat Right    Narrative    XR KNEE STANDING AP [place] BILAT LAT RIGHT 11/14/2022 10:18 AM     HISTORY: Acute pain of right knee    COMPARISON: None.       Impression    IMPRESSION: No significant joint space narrowing. There is no acute  fracture or significant right knee joint effusion. 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    KIM GARCIA MD         SYSTEM ID:  [id number]         Note: Time spent in one-on-one evaluation and discussion with the patient regarding the nature of problem, course, prior treatments, and therapeutic options, along with review of medical record (including outside sources/documentation), and completing documentation: 30 minutes.

## 2022-11-14 NOTE — LETTER
11/14/2022         RE: Salvatore Kim  [address]  [place] [address]        Dear Colleague,    Thank you for referring your patient, Salvatore Kim, to the [facility] [place]. Please see a copy of my visit note below.    ASSESSMENT & PLAN    Salvatore was seen today for pain.    Diagnoses and all orders for this visit:    Injury of right knee, initial encounter  -     XR Knee Standing AP Bent Bilat Lat Right; Future  -     MR Knee Right w/o Contrast; Future      Acute injury, undiagnosed new problem, unclear prognosis currently.  We discussed the following: symptom treatment, activity modification/rest, imaging, rehab, injection therapy, support for the affected area and referral to ortho surgery. Following discussion, plan:  MRI next. Primarily rest otherwise, for now.  Given nature of job, ok for off for now, with additional imaging planned. Discussed potential light duty as well, depending on results.  Follow-up after MRI.  Questions answered. Discussed signs and symptoms that may indicate more serious issues; the patient was instructed to seek appropriate care if noted. Salvatore indicates understanding of these issues and agrees with the plan.        See Patient Instructions  Patient Instructions   Question right knee medial meniscus injury, given nature of the mechanism, as well as ongoing symptoms.  Plan MRI of the right knee next.  In the meantime, icing, rest, over-the-counter medications needed for soreness.  Ambulatory aid such as a cane as needed.  Plan to contact you with MRI results.  In the meantime, provided a letter for work.      Advanced imaging is done by appointment. Please call [phone] to schedule your MRI.     Some insurance companies may require a prior authorization to be completed which can delay the time until you are able to schedule your appointment.       If you are active on [employer], you may have access to your test results before your provider  "is able to review the study and advise on next steps.      Please make a follow up appointment in the clinic at least 2 days following your MRI by calling [phone].        If you have any further questions for your physician or physician s care team you can contact them thru MyChart or by calling  [phone] and use option 3 to leave a voice message.   Messages received during business hours will be returned same day.          Behzad Corona DO  [facility] [place]    -----  Chief Complaint   Patient presents with     Right Knee - Pain       SUBJECTIVE  Salvatore Kim is a/an 52 year old male who is seen as a self referral for evaluation of right knee pain.  While stepping off of a ladder while at work he felt a pop and had pain in his right knee.     Pain initially, then he tried to baby it.  Pain increased to the point he was unable to walk.      The patient is seen by themselves.      Onset: 10/12/22 4.5 week(s) ago. Patient describes injury as stepping off ladder, felt and heard a pop and had pain in his knee.   Location of Pain: right medial knee   Worsened by: weight bearing, walking   Better with: nothing   Treatments tried: salon pas, Acetaminophen, use of cane   Associated symptoms: swelling, feeling of instability and popping     Orthopedic/Surgical history: NO  Social History/Occupation:      **  Still gets audible popping in right knee, with pain.  Pain is more anteromedial.  + medial swelling.  Using cane due to right knee issues.  No prior issues with right knee.    Has not returned to work.  For work, has to do long distance walking, also has to kneel and squat, and has to do ladders.      REVIEW OF SYSTEMS:  Review of Systems      OBJECTIVE:  /72   Ht 1.727 m (5' 8\")   Wt 122.5 kg (270 lb)   BMI 41.05 kg/m     General: healthy, alert and in no distress  HEENT: no scleral icterus or conjunctival erythema  Skin: no suspicious lesions or " rash. No jaundice.  CV: distal perfusion intact   Resp: normal respiratory effort without conversational dyspnea   Psych: normal mood and affect  Gait: cane and antalgic  Neuro: Normal light sensory exam of extremity       Right Knee exam    Inspection:   mild effusion   no ecchymosis    ROM:      Full active and passive ROM with flexion and extension  Some medial knee pain with full extension    Patellar Motion:      Normal patellar tracking noted through range of motion    Tender:      medial joint line    Non Tender: remainder    Special Tests:      Pain medially with Vesta       neg (-) Lachman       neg (-) anterior drawer       neg (-) posterior drawer       neg (-) varus       neg (-) valgus       + pain with forced extension          RADIOLOGY:  I independently ordered, visualized and reviewed these images with the patient  Bipartite patellae. No clear acute bony abnormality noted. Joint spaces appear fairly well preserved.    Recent Results (from the past 24 hour(s))   XR Knee Standing AP Garnett Bilat Lat Right    Narrative    XR KNEE STANDING AP SUNRISE BILAT LAT RIGHT 11/14/2022 10:18 AM     HISTORY: Acute pain of right knee    COMPARISON: None.       Impression    IMPRESSION: No significant joint space narrowing. There is no acute  fracture or significant right knee joint effusion. 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    KIM GARCIA MD         SYSTEM ID:  [id number]         Note: Time spent in one-on-one evaluation and discussion with the patient regarding the nature of problem, course, prior treatments, and therapeutic options, along with review of medical record (including outside sources/documentation), and completing documentation: 30 minutes.          Again, thank you for allowing me to participate in the care of your patient.        Sincerely,        Behzad Corona DO

## 2022-11-14 NOTE — LETTER
REPORT OF WORK ABILITY    NOTE TO EMPLOYEE: You must promptly provide a copy of this report to your  employer or worker's compensation insurer, and Qualified Rehabilitation Consultant.    Date: 11/14/2022                     Employee Name: Salvatore Kim         YOB: 1970  Medical Record Number: [id number]   Soc.Sec.No: [id number]  Employer: None                Date of Injury: 10/12/22  Managed Care Organization / Insurance Company Name: UNKNOWN    Diagnosis: right knee pain, possible medial meniscus injury  Work Related: yes     MMI: NO  Permanent Partial Disability (PPD) likely: UNKNOWN    EMPLOYEE IS ABLE TO WORK: OFF Work until 11/28/22. Beyond that time, we may consider return to work with restrictions, but await additional imaging.     RESTRICTIONS IF ANY:    Stand:    Sit:    Walk:   Kneel/[place]:    Lift/carry:    Push/Pull:    Ladder/Stair climb:    Stoop:      OTHER RESTRICTIONS: None    TREATMENT PLAN/NOTES: MRI - as ordered and ambulatory aid as needed (e.g., cane), also icing, elevate, rest, over the counter medication as needed.              Behzad MORELOS

## 2022-11-22 ENCOUNTER — TELEPHONE (OUTPATIENT)
Dept: ORTHOPEDICS | Facility: CLINIC | Age: 52
End: 2022-11-22

## 2022-11-22 ENCOUNTER — ANCILLARY PROCEDURE (OUTPATIENT)
Dept: MRI IMAGING | Facility: CLINIC | Age: 52
End: 2022-11-22
Attending: PEDIATRICS
Payer: OTHER MISCELLANEOUS

## 2022-11-22 DIAGNOSIS — S89.91XA INJURY OF RIGHT KNEE, INITIAL ENCOUNTER: ICD-10-CM

## 2022-11-22 DIAGNOSIS — M84.451A INSUFFICIENCY FRACTURE OF FEMUR, RIGHT, INITIAL ENCOUNTER (H): Primary | ICD-10-CM

## 2022-11-22 PROCEDURE — 73721 MRI JNT OF LWR EXTRE W/O DYE: CPT | Mod: RT | Performed by: RADIOLOGY

## 2022-11-22 NOTE — TELEPHONE ENCOUNTER
Results for orders placed or performed in visit on 11/22/22   MR Knee Right w/o Contrast    Narrative    MR right knee without contrast 11/22/2022 10:35 AM    Techniques: Multiplanar multisequence imaging of the right knee was  obtained without administration of intra-articular or intravenous  contrast using routing protocol.    History: evaluate ongoing medial knee pain, medial meniscus; Injury of  right knee, initial encounter    Comparison: Radiographs dated 11/14/2022    Findings:    MENISCI:  Medial meniscus: There is significant intrasubstance increased T2  signal within the posterior horn of the medial meniscus without  definite communication with the articular surface consistent this  intra-articular meniscal degeneration.  Lateral meniscus: Intact.    LIGAMENTS  Cruciate ligaments: Intact.  Medial supporting structures: Intact.  Lateral supporting structures: Intact.    EXTENSOR MECHANISM  Intact.    FLUID  Small joint effusion. No substantial Baker's cyst.    OSSEOUS and ARTICULAR STRUCTURES  Bones: Subcortical insufficiency fractures at the distal medial  femoral condyle, associated significant bone marrow edema. No  contusion, or osseous lesion is seen.    Patellofemoral compartment: Mild cartilage heterogeneity and  superficial cartilage fissuring, no significant articular cartilage  loss.    Medial compartment: Mild articular cartilage fissuring, no significant  focal articular cartilage loss.    Lateral compartment: No hyaline cartilage disease.    ANCILLARY FINDINGS  Bipartite patella.      Impression    Impression:  1. Significant subcortical insufficiency fractures of the  weightbearing aspect of the distal medial femoral condyle this  associated significant bone marrow edema. Recommend clinical  correlation for underlying bone abnormalities.  2. Intrasubstance degeneration of the posterior horn of the medial  meniscus.  3. No evidence of internal derangement ACL, posterior cruciate  ligament,  medial and lateral supporting structures are intact.  4. Mild cartilage heterogeneity and superficial cartilage fissuring of  the patella, bipartite patella.  5. No significant cartilage abnormalities in the medial and lateral  compartment, mild cartilage fissuring medial compartment.    JUTTA ELLERMANN, MD         SYSTEM ID:  [id number]

## 2022-11-23 RX ORDER — HYDROCODONE BITARTRATE AND ACETAMINOPHEN 5; 325 MG/1; MG/1
1 TABLET ORAL EVERY 8 HOURS PRN
Qty: 10 TABLET | Refills: 0 | Status: SHIPPED | OUTPATIENT
Start: 2022-11-23 | End: 2022-12-02

## 2022-11-23 NOTE — TELEPHONE ENCOUNTER
MRI right knee shows nondisplaced insufficiency fracture medial femoral condyle, with surrounding bone marrow edema (bony inflammation). Some degeneration in medial meniscus, no clear tear. Mild cartilage wearing under the kneecap, but that is likely chronic.  Other structures in knee (cartilage, ligaments) appear intact.  For this injury, primarily limit WB and monitor over time. Crutches if needed for pain (can provide if needed), or if unable to do crutches, consider at least cane or walker.  Would typically follow-up ~2-3 weeks, and repeat x-ray at that time (even though initial x-ray did not show this finding, that is typically how we monitor it), sooner if needed.  Re: work, he is currently off. I think ok for remaining off for now, but if feeling more comfortable, could do light duty with seated work only, if that was available for him.  I would be happy to have a visit with the patient (in person, by video, or by phone) to discuss further if that would be helpful.  Thanks.  Behzad Corona, , CAQ

## 2022-11-23 NOTE — TELEPHONE ENCOUNTER
One additional norco rx placed.  Sent [employer] message to pt.  Thanks.  Behzad Corona DO, CAQ

## 2022-11-23 NOTE — TELEPHONE ENCOUNTER
Spoke with patient and relayed MRI results. He is currently resting and using cane for ambulation. He is scheduled for a follow-up visit on 11/25/2022 and would like to discuss further treatment options for the fracture and meniscus tear. He also notes that he is low on his hydrocodone and has been cutting the pills in half. Advised him to alternate with Tylenol because he is not able to take NSAIDS due to his stomach surgery. He is asking for a refill. Michell Putnam ATC

## 2022-11-25 ENCOUNTER — OFFICE VISIT (OUTPATIENT)
Dept: ORTHOPEDICS | Facility: CLINIC | Age: 52
End: 2022-11-25
Payer: OTHER MISCELLANEOUS

## 2022-11-25 VITALS
HEART RATE: 60 BPM | WEIGHT: 270 LBS | BODY MASS INDEX: 41.05 KG/M2 | SYSTOLIC BLOOD PRESSURE: 111 MMHG | DIASTOLIC BLOOD PRESSURE: 71 MMHG

## 2022-11-25 DIAGNOSIS — M84.451A INSUFFICIENCY FRACTURE OF FEMUR, RIGHT, INITIAL ENCOUNTER (H): ICD-10-CM

## 2022-11-25 DIAGNOSIS — S89.91XA INJURY OF RIGHT KNEE, INITIAL ENCOUNTER: Primary | ICD-10-CM

## 2022-11-25 PROCEDURE — 99214 OFFICE O/P EST MOD 30 MIN: CPT | Performed by: PEDIATRICS

## 2022-11-25 NOTE — PROGRESS NOTES
ASSESSMENT & PLAN    Salvatore was seen today for follow up.    Diagnoses and all orders for this visit:    Injury of right knee, initial encounter    Insufficiency fracture of femur, right, initial encounter (H)      Former smoker. No other obvious reason for insufficiency fracture by history, though we can review again at subsequent visit, and consider further work up if needed. I think this is from acute injury. He notes some popping sensation in knee as well; monitor clinically for now, I don't think related to the fracture currently.  For now, focus on fracture mgmt.  Questions answered. Discussed signs and symptoms that may indicate more serious issues; the patient was instructed to seek appropriate care if noted. Salvatore indicates understanding of these issues and agrees with the plan.        See Patient Instructions  Patient Instructions   Reviewed the right knee MRI, demonstrating medial femoral condyle fracture.  I think this is more from impaction from injury, and contributing to persistent joint symptoms.  Given the nature of the injury and persistent issues here, I do not think it is a classic insufficiency fracture, rather more of an acute infection.  Plan to continue with current symptom management, icing, over-the-counter medication as needed for soreness.  Have been providing occasional Norco prescriptions, use sparingly as able.  Would advise continue with ambulatory aid, at least a cane.  Try to limit weightbearing is much as possible through the right knee.  Updated letter for work, continue to remain off given the nature of the injury.  Plan recheck approximately 2 weeks, sooner if needed.  Would anticipate repeat x-rays of the right knee at recheck as well (2 views, AP and lateral only, may be done nonweightbearing).      If you have any further questions for your physician or physician s care team you can contact them thru [employer] or by calling  [phone] and use option 3 to leave a voice  message.   Messages received during business hours will be returned same day.          [facility] [place]    SUBJECTIVE- Interim History November 25, 2022    Chief Complaint   Patient presents with     Right Knee - Follow Up     MRI results       Salvatore Kim is a 52 year old male who is seen in f/u up for    Injury of right knee, initial encounter  Insufficiency fracture of femur, right, initial encounter (H). Since last visit on 11/14/22 patient has had no new incidents.  Presents to further review results of right knee MRI completed on 11/22/22.  - Now ~ 6 weeks from initial onset    Worsened by: weight bearing, walking   Better with: nothing   Treatments tried: salon pas, Acetaminophen, use of cane   Associated symptoms: swelling, feeling of instability and popping      Orthopedic/Surgical history: NO  Social History/Occupation:        **  Previous smoker. Chart indicates history of vitamin deficiency, but none specific noted otherwise.          REVIEW OF SYSTEMS:  Review of Systems      OBJECTIVE:  /71   Pulse 60   Wt 122.5 kg (270 lb)   BMI 41.05 kg/m       Right knee:  Ambulating with cane.  Antalgic gait, pain with WB.      RADIOLOGY:  Final results and radiologist's interpretation, available in the [facility] health record.  Images were reviewed with the patient in the office today.  My personal interpretation of the performed imaging: marrow edema and small subchondral fracture medial femoral condyle. Mild cartilage irregularity in medial and PF compartments as well, not clearly acute.      Results for orders placed or performed in visit on 11/22/22   MR Knee Right w/o Contrast    Narrative    MR right knee without contrast 11/22/2022 10:35 AM    Techniques: Multiplanar multisequence imaging of the right knee was  obtained without administration of intra-articular or intravenous  contrast using routing protocol.    History: evaluate  ongoing medial knee pain, medial meniscus; Injury of  right knee, initial encounter    Comparison: Radiographs dated 11/14/2022    Findings:    MENISCI:  Medial meniscus: There is significant intrasubstance increased T2  signal within the posterior horn of the medial meniscus without  definite communication with the articular surface consistent this  intra-articular meniscal degeneration.  Lateral meniscus: Intact.    LIGAMENTS  Cruciate ligaments: Intact.  Medial supporting structures: Intact.  Lateral supporting structures: Intact.    EXTENSOR MECHANISM  Intact.    FLUID  Small joint effusion. No substantial Baker's cyst.    OSSEOUS and ARTICULAR STRUCTURES  Bones: Subcortical insufficiency fractures at the distal medial  femoral condyle, associated significant bone marrow edema. No  contusion, or osseous lesion is seen.    Patellofemoral compartment: Mild cartilage heterogeneity and  superficial cartilage fissuring, no significant articular cartilage  loss.    Medial compartment: Mild articular cartilage fissuring, no significant  focal articular cartilage loss.    Lateral compartment: No hyaline cartilage disease.    ANCILLARY FINDINGS  Bipartite patella.      Impression    Impression:  1. Significant subcortical insufficiency fractures of the  weightbearing aspect of the distal medial femoral condyle this  associated significant bone marrow edema. Recommend clinical  correlation for underlying bone abnormalities.  2. Intrasubstance degeneration of the posterior horn of the medial  meniscus.  3. No evidence of internal derangement ACL, posterior cruciate  ligament, medial and lateral supporting structures are intact.  4. Mild cartilage heterogeneity and superficial cartilage fissuring of  the patella, bipartite patella.  5. No significant cartilage abnormalities in the medial and lateral  compartment, mild cartilage fissuring medial compartment.    JUTTA ELLERMANN, MD         SYSTEM ID:  [id number]           30  minutes spent on the date of the encounter doing chart review, history and exam, documentation and further activities per the note

## 2022-11-25 NOTE — LETTER
11/25/2022         RE: Salvatore Kim  [address]  [place] [address]        Dear Colleague,    Thank you for referring your patient, Salvatore Kim, to the [facility] [place]. Please see a copy of my visit note below.    ASSESSMENT & PLAN    Salvatore was seen today for follow up.    Diagnoses and all orders for this visit:    Injury of right knee, initial encounter    Insufficiency fracture of femur, right, initial encounter (H)      Former smoker. No other obvious reason for insufficiency fracture by history, though we can review again at subsequent visit, and consider further work up if needed. I think this is from acute injury. He notes some popping sensation in knee as well; monitor clinically for now, I don't think related to the fracture currently.  For now, focus on fracture mgmt.  Questions answered. Discussed signs and symptoms that may indicate more serious issues; the patient was instructed to seek appropriate care if noted. Salvatore indicates understanding of these issues and agrees with the plan.        See Patient Instructions  Patient Instructions   Reviewed the right knee MRI, demonstrating medial femoral condyle fracture.  I think this is more from impaction from injury, and contributing to persistent joint symptoms.  Given the nature of the injury and persistent issues here, I do not think it is a classic insufficiency fracture, rather more of an acute infection.  Plan to continue with current symptom management, icing, over-the-counter medication as needed for soreness.  Have been providing occasional Norco prescriptions, use sparingly as able.  Would advise continue with ambulatory aid, at least a cane.  Try to limit weightbearing is much as possible through the right knee.  Updated letter for work, continue to remain off given the nature of the injury.  Plan recheck approximately 2 weeks, sooner if needed.  Would anticipate repeat x-rays of the right knee at  recheck as well (2 views, AP and lateral only, may be done nonweightbearing).      If you have any further questions for your physician or physician s care team you can contact them thru [employer] or by calling  [phone] and use option 3 to leave a voice message.   Messages received during business hours will be returned same day.          Behzad Corona [facility] [place]    SUBJECTIVE- Interim History November 25, 2022    Chief Complaint   Patient presents with     Right Knee - Follow Up     MRI results       Salvatore Kim is a 52 year old male who is seen in f/u up for    Injury of right knee, initial encounter  Insufficiency fracture of femur, right, initial encounter (H). Since last visit on 11/14/22 patient has had no new incidents.  Presents to further review results of right knee MRI completed on 11/22/22.  - Now ~ 6 weeks from initial onset    Worsened by: weight bearing, walking   Better with: nothing   Treatments tried: salon pas, Acetaminophen, use of cane   Associated symptoms: swelling, feeling of instability and popping      Orthopedic/Surgical history: NO  Social History/Occupation:        **  Previous smoker. Chart indicates history of vitamin deficiency, but none specific noted otherwise.          REVIEW OF SYSTEMS:  Review of Systems      OBJECTIVE:  /71   Pulse 60   Wt 122.5 kg (270 lb)   BMI 41.05 kg/m       Right knee:  Ambulating with cane.  Antalgic gait, pain with WB.      RADIOLOGY:  Final results and radiologist's interpretation, available in the [facility] health record.  Images were reviewed with the patient in the office today.  My personal interpretation of the performed imaging: marrow edema and small subchondral fracture medial femoral condyle. Mild cartilage irregularity in medial and PF compartments as well, not clearly acute.      Results for orders placed or performed in visit on 11/22/22   MR Knee Right w/o Contrast     Narrative    MR right knee without contrast 11/22/2022 10:35 AM    Techniques: Multiplanar multisequence imaging of the right knee was  obtained without administration of intra-articular or intravenous  contrast using routing protocol.    History: evaluate ongoing medial knee pain, medial meniscus; Injury of  right knee, initial encounter    Comparison: Radiographs dated 11/14/2022    Findings:    MENISCI:  Medial meniscus: There is significant intrasubstance increased T2  signal within the posterior horn of the medial meniscus without  definite communication with the articular surface consistent this  intra-articular meniscal degeneration.  Lateral meniscus: Intact.    LIGAMENTS  Cruciate ligaments: Intact.  Medial supporting structures: Intact.  Lateral supporting structures: Intact.    EXTENSOR MECHANISM  Intact.    FLUID  Small joint effusion. No substantial Baker's cyst.    OSSEOUS and ARTICULAR STRUCTURES  Bones: Subcortical insufficiency fractures at the distal medial  femoral condyle, associated significant bone marrow edema. No  contusion, or osseous lesion is seen.    Patellofemoral compartment: Mild cartilage heterogeneity and  superficial cartilage fissuring, no significant articular cartilage  loss.    Medial compartment: Mild articular cartilage fissuring, no significant  focal articular cartilage loss.    Lateral compartment: No hyaline cartilage disease.    ANCILLARY FINDINGS  Bipartite patella.      Impression    Impression:  1. Significant subcortical insufficiency fractures of the  weightbearing aspect of the distal medial femoral condyle this  associated significant bone marrow edema. Recommend clinical  correlation for underlying bone abnormalities.  2. Intrasubstance degeneration of the posterior horn of the medial  meniscus.  3. No evidence of internal derangement ACL, posterior cruciate  ligament, medial and lateral supporting structures are intact.  4. Mild cartilage heterogeneity and  superficial cartilage fissuring of  the patella, bipartite patella.  5. No significant cartilage abnormalities in the medial and lateral  compartment, mild cartilage fissuring medial compartment.    JUTTA ELLERMANN, MD         SYSTEM ID:  [id number]           30 minutes spent on the date of the encounter doing chart review, history and exam, documentation and further activities per the note         Again, thank you for allowing me to participate in the care of your patient.        Sincerely,        Behzad Corona DO

## 2022-11-25 NOTE — PATIENT INSTRUCTIONS
Reviewed the right knee MRI, demonstrating medial femoral condyle fracture.  I think this is more from impaction from injury, and contributing to persistent joint symptoms.  Given the nature of the injury and persistent issues here, I do not think it is a classic insufficiency fracture, rather more of an acute infection.  Plan to continue with current symptom management, icing, over-the-counter medication as needed for soreness.  Have been providing occasional Norco prescriptions, use sparingly as able.  Would advise continue with ambulatory aid, at least a cane.  Try to limit weightbearing is much as possible through the right knee.  Updated letter for work, continue to remain off given the nature of the injury.  Plan recheck approximately 2 weeks, sooner if needed.  Would anticipate repeat x-rays of the right knee at recheck as well (2 views, AP and lateral only, may be done nonweightbearing).      If you have any further questions for your physician or physician s care team you can contact them thru [employer] or by calling  [phone] and use option 3 to leave a voice message.   Messages received during business hours will be returned same day.

## 2022-11-25 NOTE — LETTER
REPORT OF WORK ABILITY    NOTE TO EMPLOYEE: You must promptly provide a copy of this report to your  employer or worker's compensation insurer, and Qualified Rehabilitation Consultant.    Date: November 25, 2022                     Employee Name: Salvatore Kim         YOB: 1970  Medical Record Number: [id number]   Soc.Sec.No: [id number]  Employer: None                Date of Injury: 10/12/22  Managed Care Organization / Insurance Company Name: UNKNOWN    Diagnosis: right knee pain after injury; medial femoral condyle fracture  Work Related: yes     MMI: NO  Permanent Partial Disability (PPD) likely: UNKNOWN    EMPLOYEE IS ABLE TO WORK: OFF Work until recheck, which is tentatively in 2 weeks.      RESTRICTIONS IF ANY:    Stand:    Sit:    Walk:   Kneel/[place]:    Lift/carry:    Push/Pull:    Ladder/Stair climb:    Stoop:      OTHER RESTRICTIONS: None    TREATMENT PLAN/NOTES: ambulatory aid advised (e.g., cane), also icing, elevate, rest, over the counter medication as needed.              Behzad MORELOS

## 2022-12-12 ENCOUNTER — ANCILLARY PROCEDURE (OUTPATIENT)
Dept: GENERAL RADIOLOGY | Facility: CLINIC | Age: 52
End: 2022-12-12
Attending: PEDIATRICS
Payer: COMMERCIAL

## 2022-12-12 ENCOUNTER — OFFICE VISIT (OUTPATIENT)
Dept: ORTHOPEDICS | Facility: CLINIC | Age: 52
End: 2022-12-12
Payer: OTHER MISCELLANEOUS

## 2022-12-12 VITALS
HEIGHT: 68 IN | BODY MASS INDEX: 40.92 KG/M2 | WEIGHT: 270 LBS | DIASTOLIC BLOOD PRESSURE: 78 MMHG | SYSTOLIC BLOOD PRESSURE: 118 MMHG

## 2022-12-12 DIAGNOSIS — M84.451D INSUFFICIENCY FRACTURE OF RIGHT FEMUR WITH ROUTINE HEALING, SUBSEQUENT ENCOUNTER: Primary | ICD-10-CM

## 2022-12-12 DIAGNOSIS — M84.451D INSUFFICIENCY FRACTURE OF RIGHT FEMUR WITH ROUTINE HEALING, SUBSEQUENT ENCOUNTER: ICD-10-CM

## 2022-12-12 PROCEDURE — 99213 OFFICE O/P EST LOW 20 MIN: CPT | Performed by: PEDIATRICS

## 2022-12-12 PROCEDURE — 73560 X-RAY EXAM OF KNEE 1 OR 2: CPT | Mod: TC | Performed by: RADIOLOGY

## 2022-12-12 NOTE — PROGRESS NOTES
"ASSESSMENT & PLAN    Salvatore was seen today for recheck.    Diagnoses and all orders for this visit:    Insufficiency fracture of right femur with routine healing, subsequent encounter  -     XR Knee Right 1/2 Views; Future        See Patient Instructions  Patient Instructions   Good to hear the improvement that you have had.  X-rays of the right knee today show stable findings.  Plan physical therapy next, referral placed.  Okay to continue with cane, may wean as able.  Updated workability letter, continue with current restrictions.  Plan follow-up approximately 2 weeks, sooner if needed.    If you have any further questions for your physician or physician s care team you can contact them thru [employer] or by calling  [phone] and use option 3 to leave a voice message.   Messages received during business hours will be returned same day.          Behzad Corona [facility] [place]    SUBJECTIVE- Interim History December 12, 2022    Chief Complaint   Patient presents with     Right Knee - RECHECK       Salvatore Kim is a 52 year old male who is seen in f/u up for Insufficiency fracture of right femur with routine healing, subsequent encounter. Since last visit on 11/25/22 patient has continued with the use of the cane.  Does feel there is improvement in his pain.  Does notice pain without the cane.  Did not need to use the pain medication that was refilled.   .  -10/12/22  Now ~ 2 months from initial onset  **  Feeling improved. Still using cane.      REVIEW OF SYSTEMS:  Review of Systems      OBJECTIVE:  /78   Ht 1.727 m (5' 8\")   Wt 122.5 kg (270 lb)   BMI 41.05 kg/m       Ambulating with cane  Has comfortable right knee AROM seated in clinic today    RADIOLOGY:  Final results and radiologist's interpretation, available in the [facility] health record.  Images were reviewed with the patient in the office today.  My personal interpretation of the performed imaging: faint " sclerosis medial femoral condyle, consistent with some bony healing there, otherwise no acute bony abnormality noted.      Recent Results (from the past 24 hour(s))   XR Knee Right 1/2 Views    Narrative    XR RIGHT KNEE ONE-TWO VIEWS   12/12/2022 10:58 AM     HISTORY: Knee pain; Insufficiency fracture of right femur with routine  healing, subsequent encounter    COMPARISON: Radiographs from 11/14/2022.      Impression    IMPRESSION: Bipartite patella and mild patellofemoral osteoarthrosis  again noted. No fracture, effusion or calcified intra-articular body.    ANDREW GOLDBERG MD         SYSTEM ID:  [id number]

## 2022-12-12 NOTE — LETTER
"    12/12/2022         RE: Salvatore Kim  [address]  [place] [address]        Dear Colleague,    Thank you for referring your patient, Salvatore Kim, to the [facility] [place]. Please see a copy of my visit note below.    ASSESSMENT & PLAN    Salvatore was seen today for recheck.    Diagnoses and all orders for this visit:    Insufficiency fracture of right femur with routine healing, subsequent encounter  -     XR Knee Right 1/2 Views; Future        See Patient Instructions  Patient Instructions   Good to hear the improvement that you have had.  X-rays of the right knee today show stable findings.  Plan physical therapy next, referral placed.  Okay to continue with cane, may wean as able.  Updated workability letter, continue with current restrictions.  Plan follow-up approximately 2 weeks, sooner if needed.    If you have any further questions for your physician or physician s care team you can contact them thru [facility] or by calling  [phone] and use option 3 to leave a voice message.   Messages received during business hours will be returned same day.          Behzad Corona DO  [facility] [place]    SUBJECTIVE- Interim History December 12, 2022    Chief Complaint   Patient presents with     Right Knee - RECHECK       Salvatore Kim is a 52 year old male who is seen in f/u up for Insufficiency fracture of right femur with routine healing, subsequent encounter. Since last visit on 11/25/22 patient has continued with the use of the cane.  Does feel there is improvement in his pain.  Does notice pain without the cane.  Did not need to use the pain medication that was refilled.   .  -10/12/22  Now ~ 2 months from initial onset  **  Feeling improved. Still using cane.      REVIEW OF SYSTEMS:  Review of Systems      OBJECTIVE:  /78   Ht 1.727 m (5' 8\")   Wt 122.5 kg (270 lb)   BMI 41.05 kg/m       Ambulating with cane  Has comfortable " right knee AROM seated in clinic today    RADIOLOGY:  Final results and radiologist's interpretation, available in the [facility] health record.  Images were reviewed with the patient in the office today.  My personal interpretation of the performed imaging: faint sclerosis medial femoral condyle, consistent with some bony healing there, otherwise no acute bony abnormality noted.      Recent Results (from the past 24 hour(s))   XR Knee Right 1/2 Views    Narrative    XR RIGHT KNEE ONE-TWO VIEWS   12/12/2022 10:58 AM     HISTORY: Knee pain; Insufficiency fracture of right femur with routine  healing, subsequent encounter    COMPARISON: Radiographs from 11/14/2022.      Impression    IMPRESSION: Bipartite patella and mild patellofemoral osteoarthrosis  again noted. No fracture, effusion or calcified intra-articular body.    ANDREW GOLDBERG MD         SYSTEM ID:  [id number]             Again, thank you for allowing me to participate in the care of your patient.        Sincerely,        Behzad Corona DO

## 2022-12-12 NOTE — PATIENT INSTRUCTIONS
Good to hear the improvement that you have had.  X-rays of the right knee today show stable findings.  Plan physical therapy next, referral placed.  Okay to continue with cane, may wean as able.  Updated workability letter, continue with current restrictions.  Plan follow-up approximately 2 weeks, sooner if needed.    If you have any further questions for your physician or physician s care team you can contact them thru [employer] or by calling  [phone] and use option 3 to leave a voice message.   Messages received during business hours will be returned same day.

## 2022-12-12 NOTE — LETTER
REPORT OF WORK ABILITY    NOTE TO EMPLOYEE: You must promptly provide a copy of this report to your  employer or worker's compensation insurer, and Qualified Rehabilitation Consultant.    Date: December 12, 2022                     Employee Name: Salvatore Kim         YOB: 1970  Medical Record Number: [id number]   Soc.Sec.No: [id number]  Employer: None                Date of Injury: 10/12/22  Managed Care Organization / Insurance Company Name: UNKNOWN    Diagnosis: right knee pain after injury; medial femoral condyle fracture, improving  Work Related: yes     MMI: NO  Permanent Partial Disability (PPD) likely: UNKNOWN    EMPLOYEE IS ABLE TO WORK: OFF Work until recheck, which is tentatively in 2 weeks.      RESTRICTIONS IF ANY:    Stand:    Sit:    Walk:   Kneel/[place]:    Lift/carry:    Push/Pull:    Ladder/Stair climb:    Stoop:      OTHER RESTRICTIONS: None    TREATMENT PLAN/NOTES: ambulatory aid advised (e.g., cane) but may wean if able, also icing, elevate, rest, over the counter medication as needed. Referred to physical therapy next.              Behzad MORELOS

## 2022-12-22 NOTE — PROGRESS NOTES
ASSESSMENT & PLAN    Salvatore was seen today for pain.    Diagnoses and all orders for this visit:    Insufficiency fracture of right femur with routine healing, subsequent encounter  -     XR Knee Right 1/2 Views; Future          See Patient Instructions  Patient Instructions   X-rays of the right knee once again do not clearly demonstrate previously noted bony abnormality on MRI, and this is a good thing.  Still plan to proceed with physical therapy, from the previously placed order.  You may need to connect with your work comp contact to check on if this is approved, and what facility is advised.  Updated workability letter today.  Given some improvement, okay for return to work with work restrictions.  Plan to follow-up approximately 3 weeks, sooner if needed.  I do not think we will need additional imaging going forward, unless not getting continued improvement, or any worsening or changing symptoms.    If you have any further questions for your physician or physician s care team you can contact them thru MyChart or by calling  [phone] and use option 3 to leave a voice message.   Messages received during business hours will be returned same day.          Behzad Corona, [facility] [place]    SUBJECTIVE- Interim History December 27, 2022    Chief Complaint   Patient presents with     Right Knee - Pain       Salvatore Kim is a 52 year old male who is seen in f/u up for a right femur injury. Since last visit on 12/12/2022, patient has noted continued slight medial right knee pain. Denies any current pain medications.  - Now ~ 2.5 months from initial onset    Better with: cane but is trying to discontinue cane use.  **  Has not yet started PT.  He had some discussion with work comp contact, but no follow-up from his contact regarding location of approved PT.  Is interested in return to some restricted work.    REVIEW OF SYSTEMS:  Review of Systems      OBJECTIVE:  BP  118/78   Wt 122.5 kg (270 lb)   BMI 41.05 kg/m         Right knee:  Grossly intact range of motion.  No clear effusion noted.  Has some mild tenderness and slight focal prominence over the medial femoral condyle proximally, with knee in flexion.  Compared this to x-rays and MRI, with no clear bony abnormality in this area.      RADIOLOGY:  Final results and radiologist's interpretation, available in the [facility] health record.  Images were reviewed with the patient in the office today.  My personal interpretation of the performed imaging: no obvious medial femoral condyle finding related to previous imaging.    Recent Results (from the past 24 hour(s))   XR Knee Right 1/2 Views    Narrative    XR KNEE RIGHT 1/2 VIEWS  12/27/2022 11:17 AM     HISTORY: Insufficiency fracture of right femur with routine healing,  subsequent encounter    COMPARISON: 12/12/2022 radiographs. 11/22/2022 MRI.      Impression    IMPRESSION:  No fractures are evident radiographically. Normal joint  spacing. Normal patellar alignment. Bipartite patella.     ISABELA MENDOZA MD         SYSTEM ID:  [id number]

## 2022-12-27 ENCOUNTER — ANCILLARY PROCEDURE (OUTPATIENT)
Dept: GENERAL RADIOLOGY | Facility: CLINIC | Age: 52
End: 2022-12-27
Attending: PEDIATRICS
Payer: COMMERCIAL

## 2022-12-27 ENCOUNTER — OFFICE VISIT (OUTPATIENT)
Dept: ORTHOPEDICS | Facility: CLINIC | Age: 52
End: 2022-12-27
Payer: OTHER MISCELLANEOUS

## 2022-12-27 VITALS — SYSTOLIC BLOOD PRESSURE: 118 MMHG | BODY MASS INDEX: 41.05 KG/M2 | DIASTOLIC BLOOD PRESSURE: 78 MMHG | WEIGHT: 270 LBS

## 2022-12-27 DIAGNOSIS — M84.451D INSUFFICIENCY FRACTURE OF RIGHT FEMUR WITH ROUTINE HEALING, SUBSEQUENT ENCOUNTER: ICD-10-CM

## 2022-12-27 DIAGNOSIS — M84.451D INSUFFICIENCY FRACTURE OF RIGHT FEMUR WITH ROUTINE HEALING, SUBSEQUENT ENCOUNTER: Primary | ICD-10-CM

## 2022-12-27 PROCEDURE — 73560 X-RAY EXAM OF KNEE 1 OR 2: CPT | Mod: TC | Performed by: RADIOLOGY

## 2022-12-27 PROCEDURE — 99213 OFFICE O/P EST LOW 20 MIN: CPT | Performed by: PEDIATRICS

## 2022-12-27 ASSESSMENT — PAIN SCALES - GENERAL: PAINLEVEL: MILD PAIN (2)

## 2022-12-27 NOTE — LETTER
REPORT OF WORK ABILITY    NOTE TO EMPLOYEE: You must promptly provide a copy of this report to your  employer or worker's compensation insurer, and Qualified Rehabilitation Consultant.    Date: December 27, 2022                     Employee Name: Salvatore Kim         YOB: 1970  Medical Record Number: [id number]   Soc.Sec.No: [id number]  Employer: None                Date of Injury: 10/12/22  Managed Care Organization / Insurance Company Name: UNKNOWN    Diagnosis: right knee pain after injury; medial femoral condyle fracture, improving  Work Related: yes     MMI: NO  Permanent Partial Disability (PPD) likely: UNKNOWN    EMPLOYEE IS ABLE TO WORK: return to work with restrictions next scheduled work date. If unable to accommodate restrictions, then remain off work.     RESTRICTIONS IF ANY:    Stand:  Occasionally (1-3 hours), avoid any pain; may need to limit duration to no more than 15-20 mins consecutively  Sit:  Up to full time  Walk: Occasionally (1-3 hours), avoid any pain; may need to limit duration to no more than 15-20 mins consecutively  Kneel/[place]:  Avoid on right  Lift/carry:  Up to 20 lbs  Push/Pull:  Up to 20 lbs  Ladder/Stair climb:  avoid ladders by using any reasonable precautions  Stoop:  avoid    OTHER RESTRICTIONS: None    TREATMENT PLAN/NOTES: ambulatory aid advised (e.g., cane) but may wean if able, also icing, elevate, rest, over the counter medication as needed. Referred to physical therapy previously, still plan to proceed with PT.              Behzad MORELOS

## 2022-12-27 NOTE — PATIENT INSTRUCTIONS
X-rays of the right knee once again do not clearly demonstrate previously noted bony abnormality on MRI, and this is a good thing.  Still plan to proceed with physical therapy, from the previously placed order.  You may need to connect with your work comp contact to check on if this is approved, and what facility is advised.  Updated workability letter today.  Given some improvement, okay for return to work with work restrictions.  Plan to follow-up approximately 3 weeks, sooner if needed.  I do not think we will need additional imaging going forward, unless not getting continued improvement, or any worsening or changing symptoms.    If you have any further questions for your physician or physician s care team you can contact them thru [employer] or by calling  [phone] and use option 3 to leave a voice message.   Messages received during business hours will be returned same day.

## 2022-12-27 NOTE — LETTER
12/27/2022         RE: Salvatore Kim  [address]  [place] [address]        Dear Colleague,    Thank you for referring your patient, Salvatore Kim, to the [facility] [place]. Please see a copy of my visit note below.    ASSESSMENT & PLAN    Salvatore was seen today for pain.    Diagnoses and all orders for this visit:    Insufficiency fracture of right femur with routine healing, subsequent encounter  -     XR Knee Right 1/2 Views; Future          See Patient Instructions  Patient Instructions   X-rays of the right knee once again do not clearly demonstrate previously noted bony abnormality on MRI, and this is a good thing.  Still plan to proceed with physical therapy, from the previously placed order.  You may need to connect with your work comp contact to check on if this is approved, and what facility is advised.  Updated workability letter today.  Given some improvement, okay for return to work with work restrictions.  Plan to follow-up approximately 3 weeks, sooner if needed.  I do not think we will need additional imaging going forward, unless not getting continued improvement, or any worsening or changing symptoms.    If you have any further questions for your physician or physician s care team you can contact them thru MyChart or by calling  [phone] and use option 3 to leave a voice message.   Messages received during business hours will be returned same day.          Behzad Corona DO  [facility] [place]    SUBJECTIVE- Interim History December 27, 2022    Chief Complaint   Patient presents with     Right Knee - Pain       Salvatore Kim is a 52 year old male who is seen in f/u up for a right femur injury. Since last visit on 12/12/2022, patient has noted continued slight medial right knee pain. Denies any current pain medications.  - Now ~ 2.5 months from initial onset    Better with: cane but is trying to discontinue cane  use.  **  Has not yet started PT.  He had some discussion with work comp contact, but no follow-up from his contact regarding location of approved PT.  Is interested in return to some restricted work.    REVIEW OF SYSTEMS:  Review of Systems      OBJECTIVE:  /78   Wt 122.5 kg (270 lb)   BMI 41.05 kg/m         Right knee:  Grossly intact range of motion.  No clear effusion noted.  Has some mild tenderness and slight focal prominence over the medial femoral condyle proximally, with knee in flexion.  Compared this to x-rays and MRI, with no clear bony abnormality in this area.      RADIOLOGY:  Final results and radiologist's interpretation, available in the [facility] health record.  Images were reviewed with the patient in the office today.  My personal interpretation of the performed imaging: no obvious medial femoral condyle finding related to previous imaging.    Recent Results (from the past 24 hour(s))   XR Knee Right 1/2 Views    Narrative    XR KNEE RIGHT 1/2 VIEWS  12/27/2022 11:17 AM     HISTORY: Insufficiency fracture of right femur with routine healing,  subsequent encounter    COMPARISON: 12/12/2022 radiographs. 11/22/2022 MRI.      Impression    IMPRESSION:  No fractures are evident radiographically. Normal joint  spacing. Normal patellar alignment. Bipartite patella.     BEHZAD MENDOZA MD         SYSTEM ID:  [id number]               Again, thank you for allowing me to participate in the care of your patient.        Sincerely,        Behzad Corona DO

## 2022-12-28 ENCOUNTER — TELEPHONE (OUTPATIENT)
Dept: ORTHOPEDICS | Facility: CLINIC | Age: 52
End: 2022-12-28

## 2022-12-28 NOTE — TELEPHONE ENCOUNTER
Spoke to patient discussed he would like letter releasing him to return to work without restrictions - he notes that overall knee pain is doing well.      Discussed with Dr Cj kang for updated letter as requested.    Patient contacted and letter left at [facility] .    Tremaine Lee ATC

## 2022-12-28 NOTE — LETTER
REPORT OF WORK ABILITY    NOTE TO EMPLOYEE: You must promptly provide a copy of this report to your  employer or worker's compensation insurer, and Qualified Rehabilitation Consultant.    Date: December 28, 2022              Employee Name: Salvatore Kim         YOB: 1970  Medical Record Number: [id number]   Soc.Sec.No: [id number]  Employer: None                Date of Injury: 10/12/22  Managed Care Organization / Insurance Company Name: UNKNOWN    Diagnosis: right knee pain after injury; medial femoral condyle fracture, improving  Work Related: yes     MMI: NO  Permanent Partial Disability (PPD) likely: UNKNOWN    EMPLOYEE IS ABLE TO WORK: return to work with restrictions next scheduled work date without restrictions.     OTHER RESTRICTIONS: None    TREATMENT PLAN/NOTES: Ambulatory aid advised (e.g., cane) but may wean if able, also icing, elevate, rest, over the counter medication as needed. Referred to physical therapy previously, still plan to proceed with PT.              Behzad MORELOS

## 2022-12-28 NOTE — TELEPHONE ENCOUNTER
[facility] Call Center    Phone Message    May a detailed message be left on voicemail: yes     Reason for Call: Other: Salvatore would like to have some modifcations done on his workers comp letter. Please call him [phone]     Action Taken: Other: [facility] orth[facility]    Travel Screening: Not Applicable

## 2023-02-03 NOTE — PROGRESS NOTES
ASSESSMENT & PLAN    Salvatore was seen today for recheck.    Diagnoses and all orders for this visit:    Insufficiency fracture of right femur with routine healing, subsequent encounter    Injury of right knee, subsequent encounter          See Patient Instructions  Patient Instructions   Good to hear of the continued improvement that you have had.  We reviewed again the MRI from November, demonstrating medial femoral condyle fracture.  Findings were also equivocal for meniscus tear, and there was some mild medial compartment articular cartilage change.  For next steps, still plan to proceed with local therapy.  May use the order that was previously placed.  Continue with unrestricted work, given that is going okay.  Tentatively recheck approximately 4 to 6 weeks, sooner if needed.  From there, if persistent issues, considerations could include repeat MRI (if persistent symptoms, and given x-rays have never shown the fracture), possibly a steroid injection (given other findings on MRI, including meniscus and articular cartilage as noted above).    If you have any further questions for your physician or physician s care team you can contact them thru [employer] or by calling  [phone] and use option 3 to leave a voice message.   Messages received during business hours will be returned same day.          [facility] [place]    SUBJECTIVE- Interim History February 6, 2023    Chief Complaint   Patient presents with     Right Knee - RECHECK       Salvatore Kim is a 52 year old male who is seen in f/u up for Data Unavailable. Since last visit on 12/27/22 patient reports pain has improved. He still notes intermittent sharp pain when walking. He reports this typically resolves after a short period of time. He denies increased swelling. He is not taking any medication for pain. He is not using any type of ambulatory aid. He has not started PT yet.   - Now ~ 3.5 months  "from initial onset    The patient is seen by themselves.    **        REVIEW OF SYSTEMS:  Review of Systems      OBJECTIVE:  /82   Ht 1.727 m (5' 8\")   Wt 122.5 kg (270 lb)   BMI 41.05 kg/m       GENERAL APPEARANCE: healthy, alert and no distress   GAIT: NORMAL    Right knee:  Grossly full ROM, no effusion  Occasional discomfort with some steps in exam room      RADIOLOGY:  None new. See previous notes.          "

## 2023-02-06 ENCOUNTER — OFFICE VISIT (OUTPATIENT)
Dept: ORTHOPEDICS | Facility: CLINIC | Age: 53
End: 2023-02-06
Payer: OTHER MISCELLANEOUS

## 2023-02-06 VITALS
WEIGHT: 270 LBS | BODY MASS INDEX: 40.92 KG/M2 | DIASTOLIC BLOOD PRESSURE: 82 MMHG | HEIGHT: 68 IN | SYSTOLIC BLOOD PRESSURE: 132 MMHG

## 2023-02-06 DIAGNOSIS — S89.91XD INJURY OF RIGHT KNEE, SUBSEQUENT ENCOUNTER: ICD-10-CM

## 2023-02-06 DIAGNOSIS — M84.451D INSUFFICIENCY FRACTURE OF RIGHT FEMUR WITH ROUTINE HEALING, SUBSEQUENT ENCOUNTER: Primary | ICD-10-CM

## 2023-02-06 PROCEDURE — 99213 OFFICE O/P EST LOW 20 MIN: CPT | Performed by: PEDIATRICS

## 2023-02-06 NOTE — LETTER
REPORT OF WORK ABILITY    NOTE TO EMPLOYEE: You must promptly provide a copy of this report to your  employer or worker's compensation insurer, and Qualified Rehabilitation Consultant.    Date: February 6, 2023              Employee Name: Salvatore Kim         YOB: 1970  Medical Record Number: [id number]   Soc.Sec.No: [id number]  Employer: None                Date of Injury: 10/12/22  Managed Care Organization / Insurance Company Name: UNKNOWN    Diagnosis: right knee pain after injury; medial femoral condyle fracture, improving  Work Related: yes     MMI: NO  Permanent Partial Disability (PPD) likely: UNKNOWN    EMPLOYEE IS ABLE TO WORK: return to work next scheduled work date without restrictions.     OTHER RESTRICTIONS: None    TREATMENT PLAN/NOTES: previously referred to physical therapy, still plan to proceed with PT.              Behzad MORELOS

## 2023-02-06 NOTE — LETTER
2/6/2023         RE: Salvatore Kim  [address]  [place] [address]        Dear Colleague,    Thank you for referring your patient, Salvatore Kim, to the [facility] [place]. Please see a copy of my visit note below.    ASSESSMENT & PLAN    Salvatore was seen today for recheck.    Diagnoses and all orders for this visit:    Insufficiency fracture of right femur with routine healing, subsequent encounter    Injury of right knee, subsequent encounter          See Patient Instructions  Patient Instructions   Good to hear of the continued improvement that you have had.  We reviewed again the MRI from November, demonstrating medial femoral condyle fracture.  Findings were also equivocal for meniscus tear, and there was some mild medial compartment articular cartilage change.  For next steps, still plan to proceed with local therapy.  May use the order that was previously placed.  Continue with unrestricted work, given that is going okay.  Tentatively recheck approximately 4 to 6 weeks, sooner if needed.  From there, if persistent issues, considerations could include repeat MRI (if persistent symptoms, and given x-rays have never shown the fracture), possibly a steroid injection (given other findings on MRI, including meniscus and articular cartilage as noted above).    If you have any further questions for your physician or physician s care team you can contact them thru [employer] or by calling  [phone] and use option 3 to leave a voice message.   Messages received during business hours will be returned same day.          Behzad Corona,   [facility] [place]    SUBJECTIVE- Interim History February 6, 2023    Chief Complaint   Patient presents with     Right Knee - RECHECK       Salvatore Kim is a 52 year old male who is seen in f/u up for Data Unavailable. Since last visit on 12/27/22 patient reports pain has improved. He still notes  "intermittent sharp pain when walking. He reports this typically resolves after a short period of time. He denies increased swelling. He is not taking any medication for pain. He is not using any type of ambulatory aid. He has not started PT yet.   - Now ~ 3.5 months from initial onset    The patient is seen by themselves.    **        REVIEW OF SYSTEMS:  Review of Systems      OBJECTIVE:  /82   Ht 1.727 m (5' 8\")   Wt 122.5 kg (270 lb)   BMI 41.05 kg/m       GENERAL APPEARANCE: healthy, alert and no distress   GAIT: NORMAL    Right knee:  Grossly full ROM, no effusion  Occasional discomfort with some steps in exam room      RADIOLOGY:  None new. See previous notes.              Again, thank you for allowing me to participate in the care of your patient.        Sincerely,        Behzad Corona DO    "

## 2023-02-06 NOTE — PATIENT INSTRUCTIONS
Good to hear of the continued improvement that you have had.  We reviewed again the MRI from November, demonstrating medial femoral condyle fracture.  Findings were also equivocal for meniscus tear, and there was some mild medial compartment articular cartilage change.  For next steps, still plan to proceed with local therapy.  May use the order that was previously placed.  Continue with unrestricted work, given that is going okay.  Tentatively recheck approximately 4 to 6 weeks, sooner if needed.  From there, if persistent issues, considerations could include repeat MRI (if persistent symptoms, and given x-rays have never shown the fracture), possibly a steroid injection (given other findings on MRI, including meniscus and articular cartilage as noted above).    If you have any further questions for your physician or physician s care team you can contact them thru [employer] or by calling  [phone] and use option 3 to leave a voice message.   Messages received during business hours will be returned same day.

## 2023-02-24 ENCOUNTER — TELEPHONE (OUTPATIENT)
Dept: ORTHOPEDICS | Facility: CLINIC | Age: 53
End: 2023-02-24
Payer: COMMERCIAL

## 2023-02-24 NOTE — TELEPHONE ENCOUNTER
It was requested that orders for PT  be faxed to [facility], at fax #: [phone]. This is in network.     Radha Trujillo MSA, ATC  Certified Athletic Trainer

## 2023-03-16 ENCOUNTER — VIRTUAL VISIT (OUTPATIENT)
Dept: UROLOGY | Facility: CLINIC | Age: 53
End: 2023-03-16
Payer: COMMERCIAL

## 2023-03-16 DIAGNOSIS — R79.89 LOW TESTOSTERONE: ICD-10-CM

## 2023-03-16 DIAGNOSIS — N52.03 COMBINED ARTERIAL INSUFFICIENCY AND CORPORO-VENOUS OCCLUSIVE ERECTILE DYSFUNCTION: Primary | ICD-10-CM

## 2023-03-16 PROCEDURE — 99203 OFFICE O/P NEW LOW 30 MIN: CPT | Mod: VID | Performed by: UROLOGY

## 2023-03-16 RX ORDER — TADALAFIL 5 MG/1
5 TABLET ORAL EVERY 24 HOURS
Qty: 90 TABLET | Refills: 3 | Status: SHIPPED | OUTPATIENT
Start: 2023-03-16 | End: 2024-07-08

## 2023-03-16 NOTE — PROGRESS NOTES
"Salvatore is a 52 year old who is being evaluated via a billable video visit.      How would you like to obtain your AVS? MyChart  If the video visit is dropped, the invitation should be resent by: Text to cell phone: [phone]  Will anyone else be joining your video visit? No          Assessment & Plan   Problem List Items Addressed This Visit    None  Visit Diagnoses     Combined arterial insufficiency and corporo-venous occlusive erectile dysfunction    -  Primary    Relevant Medications    tadalafil (CIALIS) 5 MG tablet    Low testosterone               Review of the result(s) of each unique test - testosterone  Prescription drug management         BMI:   Estimated body mass index is 41.05 kg/m  as calculated from the following:    Height as of 2/6/23: 1.727 m (5' 8\").    Weight as of 2/6/23: 122.5 kg (270 lb).           No follow-ups on file.    Nathaniel Hollis MD  [facility]    Licha Chase is a 52 year old, presenting for the following health issues:  Video Visit      HPI     Patient is a 52-year-old male with low testosterone along with erectile dysfunction.  Patient is currently using Clomid.  His last testosterone was 480.  He has had ED for a long time.  He uses Viagra with some success.  His risk factor is smoking in the past.    Review of Systems   Constitutional, HEENT, cardiovascular, pulmonary, gi and gu systems are negative, except as otherwise noted.      Objective           Vitals:  No vitals were obtained today due to virtual visit.    Physical Exam   GENERAL: Healthy, alert and no distress  EYES: Eyes grossly normal to inspection.  No discharge or erythema, or obvious scleral/conjunctival abnormalities.  RESP: No audible wheeze, cough, or visible cyanosis.  No visible retractions or increased work of breathing.    SKIN: Visible skin clear. No significant rash, abnormal pigmentation or lesions.  NEURO: Cranial nerves grossly intact.  Mentation and speech appropriate " for age.  PSYCH: Mentation appears normal, affect normal/bright, judgement and insight intact, normal speech and appearance well-groomed.        #1 erectile dysfunction: Rossy options discussed.  Discussed vacuum pump, medical management, penile injection and lastly surgery.  Patient wants to try Cialis daily.  Prescription sent to pharmacy.    #2 low testosterone: Continue with Clomid.    Video-Visit Details    Type of service:  Video Visit     Originating Location (pt. Location): Home    Distant Location (provider location):  Off-site  Platform used for Video Visit: Kali

## 2023-03-22 ENCOUNTER — OFFICE VISIT (OUTPATIENT)
Dept: ORTHOPEDICS | Facility: CLINIC | Age: 53
End: 2023-03-22
Payer: OTHER MISCELLANEOUS

## 2023-03-22 VITALS
DIASTOLIC BLOOD PRESSURE: 86 MMHG | WEIGHT: 270 LBS | HEIGHT: 68 IN | SYSTOLIC BLOOD PRESSURE: 130 MMHG | BODY MASS INDEX: 40.92 KG/M2

## 2023-03-22 DIAGNOSIS — S89.91XD INJURY OF RIGHT KNEE, SUBSEQUENT ENCOUNTER: Primary | ICD-10-CM

## 2023-03-22 DIAGNOSIS — M84.451D INSUFFICIENCY FRACTURE OF RIGHT FEMUR WITH ROUTINE HEALING, SUBSEQUENT ENCOUNTER: ICD-10-CM

## 2023-03-22 PROCEDURE — 99213 OFFICE O/P EST LOW 20 MIN: CPT | Performed by: PEDIATRICS

## 2023-03-22 NOTE — PROGRESS NOTES
ASSESSMENT & PLAN    Salvatore was seen today for follow up.    Diagnoses and all orders for this visit:    Injury of right knee, subsequent encounter  -     MR Knee Right w/o Contrast; Future    Insufficiency fracture of right femur with routine healing, subsequent encounter  -     MR Knee Right w/o Contrast; Future      Improved overall, but still with symptoms.  We discussed living with the knee as is, continuing physical therapy exercises, updating imaging to reassess the knee, consideration of steroid injection (noting previous MRI showed cartilage issues), and potential for referral to ortho surgery.  Will plan update MRI knee, go from there.  Continue with HEP from PT in meantime.  He may be interested in steroid injection as well, depending on MRI findings (also assuming fracture healed).  Questions answered. Discussed signs and symptoms that may indicate more serious issues; the patient was instructed to seek appropriate care if noted. Salvatore indicates understanding of these issues and agrees with the plan.        See Patient Instructions  Patient Instructions   Given the ongoing symptoms, including pain and mechanical symptoms, as well as the fact that the previous medial femoral condyle fracture was not ever well visualized on x-rays, plan to repeat MRI of the right knee.  From there, plan to have you follow-up in clinic to review results and discuss next steps.  In the meantime, okay to discontinue with formal PT, given lack of progress from there, but I would advise continuing with home exercises.  Updated workability letter today, continue with current activities, provided tolerating okay.    Advanced imaging is done by appointment. Please call [facility] ([facility]/[place]) [phone]  or [facility] ([facility]/[place]) [phone]  to schedule your MRI.     Some insurance companies may require a prior authorization to be completed which can delay the  "time until you are able to schedule your appointment.       If you are active on [employer], you may have access to your test results before your provider is able to review the study and advise on next steps.      Please make a follow up appointment in the clinic at least 2 days following your MRI by calling [phone].      If you have any further questions for your physician or physician s care team you can contact them thru [employer] or by calling  [phone] and use option 3 to leave a voice message.   Messages received during business hours will be returned same day.          Behzad Corona [facility] [place]    SUBJECTIVE- Interim History March 22, 2023    Chief Complaint   Patient presents with     Right Knee - Follow Up       Salvatore Kim is a 52 year old male who is seen in f/u up for right knee injury. Since last visit on 02/06/23 patient has felt like the pain is inconsistent. Salvatore reports that the knee is a 5/10 pain that causes a limp. Pt reports that the pain is in the same place, but is no longer using a cane. Pt reports that the knee has improved since day one, but has plateaued. He doesn't feel like the PT is doing much.  - Now ~ 4 months from initial onset    Worsened by: Sitting for extended time and getting back up, first thing in the morning  Better with:   Treatments tried: rest/activity avoidance and physical therapy  Associated symptoms:  no distal numbness or tingling; denies swelling or warmth    The patient is seen by themselves.      **  Knee pain comes and goes, but generally feels something daily.  When pain present, is anteromedial.  Getting popping sensation, sometimes with pain.  Has continued PT, but considering discontinuing as feels like has good activity level at work and can do all home exercises.      REVIEW OF SYSTEMS:  Review of Systems      OBJECTIVE:  /86   Ht 1.727 m (5' 8\")   Wt 122.5 kg (270 lb)   BMI 41.05 kg/m   "     Right knee:  Grossly full ROM  No change with AROM  Min medial joint line tenderness  No change with circumduction  Occasional steps in exam room with pain in medial knee        RADIOLOGY:  See previous notes.

## 2023-03-22 NOTE — LETTER
REPORT OF WORK ABILITY    NOTE TO EMPLOYEE: You must promptly provide a copy of this report to your  employer or worker's compensation insurer, and Qualified Rehabilitation Consultant.    Date: March 22, 2023              Employee Name: Salvatore Kim         YOB: 1970  Medical Record Number: [id number]   Soc.Sec.No: [id number]  Employer: None                Date of Injury: 10/12/22  Managed Care Organization / Insurance Company Name: UNKNOWN    Diagnosis: right knee pain after injury; medial femoral condyle fracture, overall improved from initial visit but still with symptoms, including mechanical symptoms  Work Related: yes     MMI: NO  Permanent Partial Disability (PPD) likely: UNKNOWN    EMPLOYEE IS ABLE TO WORK: return to work next scheduled work date without restrictions.     OTHER RESTRICTIONS: None    TREATMENT PLAN/NOTES: continue with home exercises from PT.  Given ongoing symptoms, plan repeat MRI, given the previous fracture was not visualized on x-ray but only on MRI, as well as persistent mechanical symptoms and ongoing pain.            Behzad MORELOS

## 2023-03-22 NOTE — LETTER
3/22/2023         RE: Salvatore Kim  [address]  [place] [address]        Dear Colleague,    Thank you for referring your patient, Salvatore Kim, to the [facility] [place]. Please see a copy of my visit note below.    ASSESSMENT & PLAN    Salvatore was seen today for follow up.    Diagnoses and all orders for this visit:    Injury of right knee, subsequent encounter  -     MR Knee Right w/o Contrast; Future    Insufficiency fracture of right femur with routine healing, subsequent encounter  -     MR Knee Right w/o Contrast; Future      Improved overall, but still with symptoms.  We discussed living with the knee as is, continuing physical therapy exercises, updating imaging to reassess the knee, consideration of steroid injection (noting previous MRI showed cartilage issues), and potential for referral to ortho surgery.  Will plan update MRI knee, go from there.  Continue with HEP from PT in meantime.  He may be interested in steroid injection as well, depending on MRI findings (also assuming fracture healed).  Questions answered. Discussed signs and symptoms that may indicate more serious issues; the patient was instructed to seek appropriate care if noted. Salvatore indicates understanding of these issues and agrees with the plan.        See Patient Instructions  Patient Instructions   Given the ongoing symptoms, including pain and mechanical symptoms, as well as the fact that the previous medial femoral condyle fracture was not ever well visualized on x-rays, plan to repeat MRI of the right knee.  From there, plan to have you follow-up in clinic to review results and discuss next steps.  In the meantime, okay to discontinue with formal PT, given lack of progress from there, but I would advise continuing with home exercises.  Updated workability letter today, continue with current activities, provided tolerating okay.    Advanced imaging is done by appointment. Please call [facility] ([facility]/[place]) [phone]  or [facility] ([facility]/[place]) [phone]  to schedule your MRI.     Some insurance companies may require a prior authorization to be completed which can delay the time until you are able to schedule your appointment.       If you are active on [employer], you may have access to your test results before your provider is able to review the study and advise on next steps.      Please make a follow up appointment in the clinic at least 2 days following your MRI by calling [phone].      If you have any further questions for your physician or physician s care team you can contact them thru [employer] or by calling  [phone] and use option 3 to leave a voice message.   Messages received during business hours will be returned same day.          Behzad Corona, [facility]IC[facility] [place]    SUBJECTIVE- Interim History March 22, 2023    Chief Complaint   Patient presents with     Right Knee - Follow Up       Salvatore Kim is a 52 year old male who is seen in f/u up for right knee injury. Since last visit on 02/06/23 patient has felt like the pain is inconsistent. Salvatore reports that the knee is a 5/10 pain that causes a limp. Pt reports that the pain is in the same place, but is no longer using a cane. Pt reports that the knee has improved since day one, but has plateaued. He doesn't feel like the PT is doing much.  - Now ~ 4 months from initial onset    Worsened by: Sitting for extended time and getting back up, first thing in the morning  Better with:   Treatments tried: rest/activity avoidance and physical therapy  Associated symptoms:  no distal numbness or tingling; denies swelling or warmth    The patient is seen by themselves.      **  Knee pain comes and goes, but generally feels something daily.  When pain present, is anteromedial.  Getting popping sensation, sometimes with pain.  Has  "continued PT, but considering discontinuing as feels like has good activity level at work and can do all home exercises.      REVIEW OF SYSTEMS:  Review of Systems      OBJECTIVE:  /86   Ht 1.727 m (5' 8\")   Wt 122.5 kg (270 lb)   BMI 41.05 kg/m       Right knee:  Grossly full ROM  No change with AROM  Min medial joint line tenderness  No change with circumduction  Occasional steps in exam room with pain in medial knee        RADIOLOGY:  See previous notes.              Again, thank you for allowing me to participate in the care of your patient.        Sincerely,        Behzad Corona, DO    "

## 2023-03-22 NOTE — PATIENT INSTRUCTIONS
Given the ongoing symptoms, including pain and mechanical symptoms, as well as the fact that the previous medial femoral condyle fracture was not ever well visualized on x-rays, plan to repeat MRI of the right knee.  From there, plan to have you follow-up in clinic to review results and discuss next steps.  In the meantime, okay to discontinue with formal PT, given lack of progress from there, but I would advise continuing with home exercises.  Updated workability letter today, continue with current activities, provided tolerating okay.    Advanced imaging is done by appointment. Please call [facility] ([facility]/[place]) [phone]  or [facility] ([facility]/[place]) [phone]  to schedule your MRI.     Some insurance companies may require a prior authorization to be completed which can delay the time until you are able to schedule your appointment.       If you are active on [employer], you may have access to your test results before your provider is able to review the study and advise on next steps.      Please make a follow up appointment in the clinic at least 2 days following your MRI by calling [phone].      If you have any further questions for your physician or physician s care team you can contact them thru [employer] or by calling  [phone] and use option 3 to leave a voice message.   Messages received during business hours will be returned same day.

## 2023-04-02 ENCOUNTER — HEALTH MAINTENANCE LETTER (OUTPATIENT)
Age: 53
End: 2023-04-02

## 2023-04-20 ENCOUNTER — TELEPHONE (OUTPATIENT)
Dept: ORTHOPEDICS | Facility: CLINIC | Age: 53
End: 2023-04-20
Payer: COMMERCIAL

## 2023-04-20 NOTE — TELEPHONE ENCOUNTER
[facility] Call Center    Phone Message    May a detailed message be left on voicemail: yes     Reason for Call: Other: Salvatore called he needs the order for his MRI of right knee faxed to [facility] at [phone].     Action Taken: Other: [facility]    Travel Screening: Not Applicable

## 2023-04-24 ENCOUNTER — TELEPHONE (OUTPATIENT)
Dept: ORTHOPEDICS | Facility: CLINIC | Age: 53
End: 2023-04-24
Payer: COMMERCIAL

## 2023-04-24 NOTE — TELEPHONE ENCOUNTER
[facility] Call Center    Phone Message:     Pt called, requesting for his MRI order to be FAXED to [facility] in [place].    [facility]  FAX  [phone]    Pt would like a CALL BACK as a FOLLOW UP, so he knows when the order was faxed to [facility]. Please call pt. Thank you.    Reason for Call: Other: MRI Order, Please FAX     Action Taken: Message routed to:  Other: [facility]    Travel Screening: Not Applicable

## 2023-04-25 NOTE — TELEPHONE ENCOUNTER
MRI order was faxed to [facility].    Called and spoke with patient.  I explained that the order has been sent to [facility], he expressed understanding.      Ara Camejo, ATC

## 2023-04-26 ENCOUNTER — TELEPHONE (OUTPATIENT)
Dept: ORTHOPEDICS | Facility: CLINIC | Age: 53
End: 2023-04-26
Payer: COMMERCIAL

## 2023-04-26 NOTE — TELEPHONE ENCOUNTER
Salvatore Kim   1970 called requesting his MRI order be RE- faxed to [facility] at [phone]. Wanted return call to [phone] once faxed.    Chey Mccarty, [facility]    [facility] Triage    FAX # correction via [employer] website [phone]    Confirmed transmission via [facility]

## 2023-04-28 ENCOUNTER — TRANSFERRED RECORDS (OUTPATIENT)
Dept: HEALTH INFORMATION MANAGEMENT | Facility: CLINIC | Age: 53
End: 2023-04-28
Payer: COMMERCIAL

## 2023-05-08 ENCOUNTER — OFFICE VISIT (OUTPATIENT)
Dept: ORTHOPEDICS | Facility: CLINIC | Age: 53
End: 2023-05-08
Payer: OTHER MISCELLANEOUS

## 2023-05-08 VITALS
HEIGHT: 67 IN | DIASTOLIC BLOOD PRESSURE: 70 MMHG | WEIGHT: 278 LBS | SYSTOLIC BLOOD PRESSURE: 122 MMHG | BODY MASS INDEX: 43.63 KG/M2

## 2023-05-08 DIAGNOSIS — M84.451D INSUFFICIENCY FRACTURE OF RIGHT FEMUR WITH ROUTINE HEALING, SUBSEQUENT ENCOUNTER: Primary | ICD-10-CM

## 2023-05-08 PROCEDURE — 99213 OFFICE O/P EST LOW 20 MIN: CPT | Performed by: PEDIATRICS

## 2023-05-08 NOTE — LETTER
5/8/2023         RE: Salvatore Kim  [address]  [place] [address]        Dear Colleague,    Thank you for referring your patient, Salvatore Kim, to the [facility] [place]. Please see a copy of my visit note below.    ASSESSMENT & PLAN    Salvatore was seen today for follow up.    Diagnoses and all orders for this visit:    Insufficiency fracture of right femur with routine healing, subsequent encounter        See Patient Instructions  Patient Instructions   Reviewed the updated right knee MRI from [facility] 4/28/23, and this scan still demonstrates to some degree the previously noted subchondral fracture in the medial femoral condyle.  Bone marrow edema (inflammation in the area) has improved, though it still appears that the fracture lucency is somewhat visible.  With the waxing and waning nature of symptoms, question the degree to which this is still symptomatic; this is a possibility.  For now, continue with previous therapy exercises, and continue with current level of work (updated workability letter today, currently on restricted).  I will also send a note off to 1 or 2 of our orthopedic surgery colleagues for review of the scan as well, to see if other intervention would be warranted.  From there, plan to contact you with an update.  Contact clinic if you have not heard anything back over the next 1 to 2 weeks.    If you have any further questions for your physician or physician s care team you can contact them thru [employer] or by calling  [phone] and use option 3 to leave a voice message.   Messages received during business hours will be returned same day.          Behzad Corona DO  [facility] [place]    SUBJECTIVE- Interim History May 8, 2023    Chief Complaint   Patient presents with     Right Knee - Follow Up       Salvatore Kim is a 53 year old male who is seen in f/u up for Insufficiency fracture of right femur with routine  "healing, subsequent encounter. Since last visit on 3/22/23 patient has felt about the same. Reports that his pain is still when he gets up after sleeping, sometimes walking around makes it better, and sometimes after sitting for a while it hurts when getting up. Very inconsistent with pain, ranging from crippling bad to nothing.  - Now ~ 6-months from initial onset    The patient is seen by themselves.      REVIEW OF SYSTEMS:  Review of Systems      OBJECTIVE:  /70   Ht 1.702 m (5' 7\")   Wt 126.1 kg (278 lb)   BMI 43.54 kg/m       GENERAL APPEARANCE: healthy, alert and no distress   GAIT: NORMAL      Right knee with grossly full ROM, no effusion.      RADIOLOGY:  Final results and radiologist's interpretation, available in the [facility] health record.  Images were reviewed with the patient in the office today.  My personal interpretation of the performed imaging: irregular signal in the medial femoral condyle consistent with previously noted subchondral fracture. However, bone marrow edema has significantly improved in the medial femoral condyle. No significant articular cartilage injury appreciated. No clear meniscus tear.        [facility]  [address] [place] [address]  Phone: [phone]  Fax: [phone]    Referring Physician Information:  Behzad Corona D.O.  [facility] [address] [address] [facility] [address]  Phone: [phone]  Fax: [phone]  Patient: Salvatore Kim  YOB: 1970  Sex: Male  Phone: [phone]    [facility] MRN: [id number]  Exam Date: 04/28/2023     EXAM: MRI of the RIGHT KNEE, without contrast    CLINICAL: Right knee pain. Evaluate for insufficiency fracture.    COMPARISONS: None available.    TECHNICAL: MR sequences of the right knee:    sagittals: PD, PDFS    coronals: PD, T2FS    axials: PD, PDFS    SEDATION: None.    CONTRAST: None.    ___________________________________________________    FINDINGS:    Ligaments:    ACL: Intact " and unremarkable.    PCL: Intact and unremarkable.    MCL: Intact and unremarkable.    LCL: Intact and unremarkable.    Posterolateral corner:    Popliteus, biceps femoris, iliotibial band, and the popliteofibular ligament appear intact.    Posteromedial corner:    Semimembranosus, pes anserine tendons and posterior oblique ligament appear intact.    Extensor mechanism:    Patellar tendon: Intact, without tendinopathy.    Quadriceps tendon: Intact, without tendinopathy.    Retinacula: Medial and lateral retinacula are intact.    Fat pads: Unremarkable infrapatellar Hoffa's, quadriceps and prefemoral fat pads.    Patellofemoral joint:    Patella: No significant chondromalacia.    Trochlea: No significant chondromalacia.    Medial compartment:    Medial meniscus: Degenerative signal changes are seen within the medial meniscus without discrete meniscal tear or meniscal displacement.    Medial cartilage: No significant chondromalacia.    Lateral compartment:    Lateral meniscus: Degenerative signal changes are seen within the lateral meniscus without discrete meniscal tear or meniscal displacement.    Lateral cartilage: No significant chondromalacia.    Knee joint:    Effusion: Physiologic right knee effusion.    Intra-articular bodies: No convincing bodies identified.    Popliteal cyst: None.    Bones:    Small subchondral fracture involves the weightbearing medial femoral condyle with associated adjacent bone marrow edema. Remaining imaged osseous structures appear intact. 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    ___________________________________________________    IMPRESSION:    1. Small subchondral fracture involving the weightbearing medial femoral condyle with associated adjacent bone marrow edema.    2. Bipartite patella.    3. No evidence of meniscal tear or ligamentous injury. No chondral defects identified.    Z        Electronically signed on 4/29/2023 10:16:00 AM by Rohan Palencia  D.O.                      Again, thank you for allowing me to participate in the care of your patient.        Sincerely,        Behzad Corona, DO

## 2023-05-08 NOTE — PROGRESS NOTES
ASSESSMENT & PLAN    Salvatore was seen today for follow up.    Diagnoses and all orders for this visit:    Insufficiency fracture of right femur with routine healing, subsequent encounter        See Patient Instructions  Patient Instructions   Reviewed the updated right knee MRI from [facility] 4/28/23, and this scan still demonstrates to some degree the previously noted subchondral fracture in the medial femoral condyle.  Bone marrow edema (inflammation in the area) has improved, though it still appears that the fracture lucency is somewhat visible.  With the waxing and waning nature of symptoms, question the degree to which this is still symptomatic; this is a possibility.  For now, continue with previous therapy exercises, and continue with current level of work (updated workability letter today, currently on restricted).  I will also send a note off to 1 or 2 of our orthopedic surgery colleagues for review of the scan as well, to see if other intervention would be warranted.  From there, plan to contact you with an update.  Contact clinic if you have not heard anything back over the next 1 to 2 weeks.    If you have any further questions for your physician or physician s care team you can contact them thru MyChart or by calling  [phone] and use option 3 to leave a voice message.   Messages received during business hours will be returned same day.          Behzad Corona, [facility] [place]    SUBJECTIVE- Interim History May 8, 2023    Chief Complaint   Patient presents with     Right Knee - Follow Up       Salvatore Kim is a 53 year old male who is seen in f/u up for Insufficiency fracture of right femur with routine healing, subsequent encounter. Since last visit on 3/22/23 patient has felt about the same. Reports that his pain is still when he gets up after sleeping, sometimes walking around makes it better, and sometimes after sitting for a while it hurts when getting up.  "Very inconsistent with pain, ranging from crippling bad to nothing.  - Now ~ 6-months from initial onset    The patient is seen by themselves.      REVIEW OF SYSTEMS:  Review of Systems      OBJECTIVE:  /70   Ht 1.702 m (5' 7\")   Wt 126.1 kg (278 lb)   BMI 43.54 kg/m       GENERAL APPEARANCE: healthy, alert and no distress   GAIT: NORMAL      Right knee with grossly full ROM, no effusion.      RADIOLOGY:  Final results and radiologist's interpretation, available in the [facility] health record.  Images were reviewed with the patient in the office today.  My personal interpretation of the performed imaging: irregular signal in the medial femoral condyle consistent with previously noted subchondral fracture. However, bone marrow edema has significantly improved in the medial femoral condyle. No significant articular cartilage injury appreciated. No clear meniscus tear.        [facility] [place]  [address] [facility] [address]  Phone: [phone]  Fax: [phone]    Referring Physician Information:  Behzad Corona D.O.  [facility] [address] [address] [facility] [address]  Phone: [phone]  Fax: [phone]  Patient: Salvatore Kim  YOB: 1970  Sex: Male  Phone: [phone]    [facility] MRN: [id number]  Exam Date: 04/28/2023     EXAM: MRI of the RIGHT KNEE, without contrast    CLINICAL: Right knee pain. Evaluate for insufficiency fracture.    COMPARISONS: None available.    TECHNICAL: MR sequences of the right knee:    sagittals: PD, PDFS    coronals: PD, T2FS    axials: PD, PDFS    SEDATION: None.    CONTRAST: None.    ___________________________________________________    FINDINGS:    Ligaments:    ACL: Intact and unremarkable.    PCL: Intact and unremarkable.    MCL: Intact and unremarkable.    LCL: Intact and unremarkable.    Posterolateral corner:    Popliteus, biceps femoris, iliotibial band, and the popliteofibular ligament appear intact.    Posteromedial " corner:    Semimembranosus, pes anserine tendons and posterior oblique ligament appear intact.    Extensor mechanism:    Patellar tendon: Intact, without tendinopathy.    Quadriceps tendon: Intact, without tendinopathy.    Retinacula: Medial and lateral retinacula are intact.    Fat pads: Unremarkable infrapatellar Hoffa's, quadriceps and prefemoral fat pads.    Patellofemoral joint:    Patella: No significant chondromalacia.    Trochlea: No significant chondromalacia.    Medial compartment:    Medial meniscus: Degenerative signal changes are seen within the medial meniscus without discrete meniscal tear or meniscal displacement.    Medial cartilage: No significant chondromalacia.    Lateral compartment:    Lateral meniscus: Degenerative signal changes are seen within the lateral meniscus without discrete meniscal tear or meniscal displacement.    Lateral cartilage: No significant chondromalacia.    Knee joint:    Effusion: Physiologic right knee effusion.    Intra-articular bodies: No convincing bodies identified.    Popliteal cyst: None.    Bones:    Small subchondral fracture involves the weightbearing medial femoral condyle with associated adjacent bone marrow edema. Remaining imaged osseous structures appear intact. 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    ___________________________________________________    IMPRESSION:    1. Small subchondral fracture involving the weightbearing medial femoral condyle with associated adjacent bone marrow edema.    2. Bipartite patella.    3. No evidence of meniscal tear or ligamentous injury. No chondral defects identified.    Z        Electronically signed on 4/29/2023 10:16:00 AM by Rohan Palencia D.O.

## 2023-05-08 NOTE — PATIENT INSTRUCTIONS
Reviewed the updated right knee MRI from [facility] 4/28/23, and this scan still demonstrates to some degree the previously noted subchondral fracture in the medial femoral condyle.  Bone marrow edema (inflammation in the area) has improved, though it still appears that the fracture lucency is somewhat visible.  With the waxing and waning nature of symptoms, question the degree to which this is still symptomatic; this is a possibility.  For now, continue with previous therapy exercises, and continue with current level of work (updated workability letter today, currently on restricted).  I will also send a note off to 1 or 2 of our orthopedic surgery colleagues for review of the scan as well, to see if other intervention would be warranted.  From there, plan to contact you with an update.  Contact clinic if you have not heard anything back over the next 1 to 2 weeks.    If you have any further questions for your physician or physician s care team you can contact them thru [employer] or by calling  [phone] and use option 3 to leave a voice message.   Messages received during business hours will be returned same day.

## 2023-05-08 NOTE — LETTER
REPORT OF WORK ABILITY    NOTE TO EMPLOYEE: You must promptly provide a copy of this report to your  employer or worker's compensation insurer, and Qualified Rehabilitation Consultant.    Date: May 8, 2023              Employee Name: Salvatore Kmi         YOB: 1970  Medical Record Number: [id number]   Soc.Sec.No: [id number]  Employer: None                Date of Injury: 10/12/22  Managed Care Organization / Insurance Company Name: UNKNOWN    Diagnosis: right knee pain after injury; medial femoral condyle fracture, overall improved from initial visit but still with symptoms, including mechanical symptoms  Work Related: yes     MMI: NO  Permanent Partial Disability (PPD) likely: UNKNOWN    EMPLOYEE IS ABLE TO WORK: return to work next scheduled work date without restrictions.     OTHER RESTRICTIONS: None    TREATMENT PLAN/NOTES: continue with home exercises from PT.  Plan to reach out to orthopedic surgery regarding the MRI findings, which show improvement in the degree of bone marrow edema overall, but some persistent irregularity of the medial femoral condyle in the same location as the previous fracture.            Behzad SANCHEZ.

## 2023-07-18 ENCOUNTER — OFFICE VISIT (OUTPATIENT)
Dept: ORTHOPEDICS | Facility: CLINIC | Age: 53
End: 2023-07-18
Payer: COMMERCIAL

## 2023-07-18 VITALS
HEIGHT: 67 IN | WEIGHT: 278 LBS | BODY MASS INDEX: 43.63 KG/M2 | SYSTOLIC BLOOD PRESSURE: 112 MMHG | DIASTOLIC BLOOD PRESSURE: 75 MMHG

## 2023-07-18 DIAGNOSIS — M84.451S: ICD-10-CM

## 2023-07-18 DIAGNOSIS — S89.91XD INJURY OF RIGHT KNEE, SUBSEQUENT ENCOUNTER: Primary | ICD-10-CM

## 2023-07-18 PROCEDURE — 99213 OFFICE O/P EST LOW 20 MIN: CPT | Mod: 25 | Performed by: PEDIATRICS

## 2023-07-18 PROCEDURE — 20610 DRAIN/INJ JOINT/BURSA W/O US: CPT | Mod: RT | Performed by: PEDIATRICS

## 2023-07-18 RX ORDER — LIDOCAINE HYDROCHLORIDE 10 MG/ML
2 INJECTION, SOLUTION INFILTRATION; PERINEURAL
Status: DISCONTINUED | OUTPATIENT
Start: 2023-07-18 | End: 2024-06-05

## 2023-07-18 RX ORDER — TRIAMCINOLONE ACETONIDE 40 MG/ML
40 INJECTION, SUSPENSION INTRA-ARTICULAR; INTRAMUSCULAR
Status: DISCONTINUED | OUTPATIENT
Start: 2023-07-18 | End: 2024-06-05

## 2023-07-18 RX ADMIN — LIDOCAINE HYDROCHLORIDE 2 ML: 10 INJECTION, SOLUTION INFILTRATION; PERINEURAL at 16:47

## 2023-07-18 RX ADMIN — TRIAMCINOLONE ACETONIDE 40 MG: 40 INJECTION, SUSPENSION INTRA-ARTICULAR; INTRAMUSCULAR at 16:47

## 2023-07-18 NOTE — LETTER
7/18/2023         RE: Salvatore Kim  [address]  [place] [address]        Dear Colleague,    Thank you for referring your patient, Salvatore Kim, to the [facility] [place]. Please see a copy of my visit note below.    ASSESSMENT & PLAN    Salvatore was seen today for follow up.    Diagnoses and all orders for this visit:    Injury of right knee, subsequent encounter  -     Large Joint Injection/Arthocentesis: R knee joint    Insufficiency fracture of femur, right, sequela  -     Large Joint Injection/Arthocentesis: R knee joint      Continued intermittent issues with the right knee, though overall improved.  Right knee steroid injection done today, with intent to calm down overall knee discomfort.  Updated work letter today, continue with unrestricted work.  Plan to recheck approximately 1 month, to assess response to injection.    Questions answered. Discussed signs and symptoms that may indicate more serious issues; the patient was instructed to seek appropriate care if noted. Salvatore indicates understanding of these issues and agrees with the plan.          See Patient Instructions  Patient Instructions   Reviewed options again for ongoing symptoms of the right knee, including with steroid injection, physical therapy, potential for referral onto orthopedic surgery.  Keep up with comfortable home exercises from previous PT.  We reviewed that there is no obvious surgical indication, and I had previously reviewed with orthopedic surgery with the same advice.  Right knee steroid injection done today.  Updated work letter today, continue with unrestricted work.  Plan recheck approximately 1 month, to assess response to the injection.  Contact clinic or follow-up sooner if needed.    If you have any further questions for your physician or physician s care team you can contact them thru [employer] or by calling [phone].        Injection Discharge Instructions      You may shower,  however avoid swimming, tub baths or hot tubs for 24 hours following your procedure    You may have a mild to moderate increase in pain for several days following the injection.    It may take up to 14 days for the steroid medication to start working although you may feel the effect as early as a few days after the procedure.    You may use ice packs for 10-15 minutes, 3 to 4 times a day at the injection site for comfort    You may use anti-inflammatory medications (such as Ibuprofen or Aleve or Advil) if you are able to take safely, or acetaminophen (Tylenol) for pain control if necessary    If you were fasting, you may resume your normal diet and medications after the procedure    If you have diabetes, check your blood sugar more frequently than usual as your blood sugar may be higher than normal for 10-14 days following a steroid injection. Contact your doctor who manages your diabetes if your blood sugar is higher than usual      If you experience any of the following, call the clinic @ [phone]  - Fever over 100 degree F  - Swelling, bleeding, redness, drainage, warmth at the injection site  - New or worsening pain        [facility] [place]    SUBJECTIVE- Interim History July 18, 2023    Chief Complaint   Patient presents with     Right Knee - Follow Up       Salvatore Kim is a 53 year old male who is seen in f/u up for    Right knee issues. Since last visit on 5/8/23 patient has felt that the knee is inconsistent with pain. Noting that it goes away and comes back randomly. Does note that he feels the same now, but notes improvement since initial onset.  Notes that he has red rash from the weekend along lower leg.  The patient is seen by themselves.      **  Above information per rooming staff.  Additional history:  No issues walking in today.  However, can note sharp pain at times, including over this past weekend after sitting for prolonged period  "and then getting up and moving.        REVIEW OF SYSTEMS:  Review of Systems    OBJECTIVE:  /75   Ht 1.702 m (5' 7\")   Wt 126.1 kg (278 lb)   BMI 43.54 kg/m     General: healthy, alert and in no distress  Skin: no suspicious lesions or rash.  CV: distal perfusion intact   Resp: normal respiratory effort without conversational dyspnea   Psych: normal mood and affect  Gait: NORMAL  Neuro: Normal light sensory exam of extremity       Additional not repeated.        RADIOLOGY:  None new. See previous notes.          Large Joint Injection/Arthocentesis: R knee joint    Date/Time: 7/18/2023 4:47 PM    Performed by: Behzad Corona DO  Authorized by: Behzad Corona DO    Indications:  Osteoarthritis and pain  Needle Size:  25 G  Guidance: landmark guided    Approach:  Anteromedial  Location:  Knee      Medications:  40 mg triamcinolone 40 MG/ML; 2 mL lidocaine 1 %  Outcome:  Tolerated well, no immediate complications  Procedure discussed: discussed risks, benefits, and alternatives    Consent Given by:  Patient  Timeout: timeout called immediately prior to procedure    Prep: patient was prepped and draped in usual sterile fashion                  Again, thank you for allowing me to participate in the care of your patient.        Sincerely,        Behzad Corona DO    "

## 2023-07-18 NOTE — LETTER
REPORT OF WORK ABILITY    NOTE TO EMPLOYEE: You must promptly provide a copy of this report to your  employer or worker's compensation insurer, and Qualified Rehabilitation Consultant.    Date: July 18, 2023             Employee Name: Salvatore Kim         YOB: 1970  Medical Record Number: [id number]   Soc.Sec.No: [id number]  Employer: None                Date of Injury: 10/12/22  Managed Care Organization / Insurance Company Name: UNKNOWN    Diagnosis: right knee pain after injury; medial femoral condyle fracture, overall improved from initial visit but still with symptoms  Work Related: yes     MMI: NO  Permanent Partial Disability (PPD) likely: UNKNOWN    EMPLOYEE IS ABLE TO WORK: return to work next scheduled work date without restrictions.     OTHER RESTRICTIONS: None    TREATMENT PLAN/NOTES: continue with home exercises from PT.  Right knee steroid injection done today.  Monitor additional 1 month.            Behzad SANCHEZ.

## 2023-07-18 NOTE — PATIENT INSTRUCTIONS
Reviewed options again for ongoing symptoms of the right knee, including with steroid injection, physical therapy, potential for referral onto orthopedic surgery.  Keep up with comfortable home exercises from previous PT.  We reviewed that there is no obvious surgical indication, and I had previously reviewed with orthopedic surgery with the same advice.  Right knee steroid injection done today.  Updated work letter today, continue with unrestricted work.  Plan recheck approximately 1 month, to assess response to the injection.  Contact clinic or follow-up sooner if needed.    If you have any further questions for your physician or physician s care team you can contact them thru [facility] or by calling [phone].        Injection Discharge Instructions    You may shower, however avoid swimming, tub baths or hot tubs for 24 hours following your procedure  You may have a mild to moderate increase in pain for several days following the injection.  It may take up to 14 days for the steroid medication to start working although you may feel the effect as early as a few days after the procedure.  You may use ice packs for 10-15 minutes, 3 to 4 times a day at the injection site for comfort  You may use anti-inflammatory medications (such as Ibuprofen or Aleve or Advil) if you are able to take safely, or acetaminophen (Tylenol) for pain control if necessary  If you were fasting, you may resume your normal diet and medications after the procedure  If you have diabetes, check your blood sugar more frequently than usual as your blood sugar may be higher than normal for 10-14 days following a steroid injection. Contact your doctor who manages your diabetes if your blood sugar is higher than usual    If you experience any of the following, call the clinic @ [phone]  - Fever over 100 degree F  - Swelling, bleeding, redness, drainage, warmth at the injection site  - New or worsening pain    
No indicators present

## 2023-07-18 NOTE — PROGRESS NOTES
ASSESSMENT & PLAN    Salvatore was seen today for follow up.    Diagnoses and all orders for this visit:    Injury of right knee, subsequent encounter  -     Large Joint Injection/Arthocentesis: R knee joint    Insufficiency fracture of femur, right, sequela  -     Large Joint Injection/Arthocentesis: R knee joint      Continued intermittent issues with the right knee, though overall improved.  Right knee steroid injection done today, with intent to calm down overall knee discomfort.  Updated work letter today, continue with unrestricted work.  Plan to recheck approximately 1 month, to assess response to injection.    Questions answered. Discussed signs and symptoms that may indicate more serious issues; the patient was instructed to seek appropriate care if noted. Salvatore indicates understanding of these issues and agrees with the plan.          See Patient Instructions  Patient Instructions   Reviewed options again for ongoing symptoms of the right knee, including with steroid injection, physical therapy, potential for referral onto orthopedic surgery.  Keep up with comfortable home exercises from previous PT.  We reviewed that there is no obvious surgical indication, and I had previously reviewed with orthopedic surgery with the same advice.  Right knee steroid injection done today.  Updated work letter today, continue with unrestricted work.  Plan recheck approximately 1 month, to assess response to the injection.  Contact clinic or follow-up sooner if needed.    If you have any further questions for your physician or physician s care team you can contact them thru [employer] or by calling [phone].        Injection Discharge Instructions      You may shower, however avoid swimming, tub baths or hot tubs for 24 hours following your procedure    You may have a mild to moderate increase in pain for several days following the injection.    It may take up to 14 days for the steroid medication to start working although  "you may feel the effect as early as a few days after the procedure.    You may use ice packs for 10-15 minutes, 3 to 4 times a day at the injection site for comfort    You may use anti-inflammatory medications (such as Ibuprofen or Aleve or Advil) if you are able to take safely, or acetaminophen (Tylenol) for pain control if necessary    If you were fasting, you may resume your normal diet and medications after the procedure    If you have diabetes, check your blood sugar more frequently than usual as your blood sugar may be higher than normal for 10-14 days following a steroid injection. Contact your doctor who manages your diabetes if your blood sugar is higher than usual      If you experience any of the following, call the clinic @ [phone]  - Fever over 100 degree F  - Swelling, bleeding, redness, drainage, warmth at the injection site  - New or worsening pain        Behzad Corona [facility]    SUBJECTIVE- Interim History July 18, 2023    Chief Complaint   Patient presents with     Right Knee - Follow Up       Salvatore Kim is a 53 year old male who is seen in f/u up for    Right knee issues. Since last visit on 5/8/23 patient has felt that the knee is inconsistent with pain. Noting that it goes away and comes back randomly. Does note that he feels the same now, but notes improvement since initial onset.  Notes that he has red rash from the weekend along lower leg.  The patient is seen by themselves.      **  Above information per rooming staff.  Additional history:  No issues walking in today.  However, can note sharp pain at times, including over this past weekend after sitting for prolonged period and then getting up and moving.        REVIEW OF SYSTEMS:  Review of Systems    OBJECTIVE:  /75   Ht 1.702 m (5' 7\")   Wt 126.1 kg (278 lb)   BMI 43.54 kg/m     General: healthy, alert and in no distress  Skin: no suspicious lesions or rash.  CV: " distal perfusion intact   Resp: normal respiratory effort without conversational dyspnea   Psych: normal mood and affect  Gait: NORMAL  Neuro: Normal light sensory exam of extremity       Additional not repeated.        RADIOLOGY:  None new. See previous notes.          Large Joint Injection/Arthocentesis: R knee joint    Date/Time: 7/18/2023 4:47 PM    Performed by: Behzad Corona DO  Authorized by: Behzad Corona DO    Indications:  Osteoarthritis and pain  Needle Size:  25 G  Guidance: landmark guided    Approach:  Anteromedial  Location:  Knee      Medications:  40 mg triamcinolone 40 MG/ML; 2 mL lidocaine 1 %  Outcome:  Tolerated well, no immediate complications  Procedure discussed: discussed risks, benefits, and alternatives    Consent Given by:  Patient  Timeout: timeout called immediately prior to procedure    Prep: patient was prepped and draped in usual sterile fashion

## 2023-08-31 ENCOUNTER — TELEPHONE (OUTPATIENT)
Dept: ORTHOPEDICS | Facility: CLINIC | Age: 53
End: 2023-08-31

## 2023-08-31 NOTE — TELEPHONE ENCOUNTER
LVM for Salvatore as forms from [employer], the Insurance company overseeing his worker's comp, sent in a healthcare provider report form. Have not seen Salvatore since injection on 7/18/23 when advised a follow-up after 1 month.  NEIL Hyatt, ATC    Reason for Call:  Form, our goal is to have forms completed with 7 days, however, some forms may require a visit or additional information.    Type of letter, form or note:  work comp     Who is the form from?: Insurance    Where did the form come from: form was mailed in    Phone number of person requesting form: [phone]  Can we leave a detailed message on this number:  Not Applicable    Desired completion date of form: 5-10 days      How will form be returned?:  fax to [phone]    Has the patient signed a consent form for release of information (may be included with form)? YES    Additional comments: Form filled out, awaiting patient updates    Form was started and place in Provider Basket for provider review/ completion at 8/30/2023 in [place].

## 2023-09-01 NOTE — TELEPHONE ENCOUNTER
VIKRAMM for Salvatore to let us know how he is doing. Left call back number for clinic number, [phone].  NEIL Hyatt, ATC

## 2023-09-07 NOTE — TELEPHONE ENCOUNTER
Talked with Salvatore,  Salvatore states that he feels like he is working full and has reached maximum medical improvement.  Forms completed, pending signature and review by provider.  Forms are in provider basket.  NEIL Hyatt, ATC

## 2024-01-17 ENCOUNTER — OFFICE VISIT (OUTPATIENT)
Dept: FAMILY MEDICINE | Facility: CLINIC | Age: 54
End: 2024-01-17
Payer: COMMERCIAL

## 2024-01-17 VITALS
BODY MASS INDEX: 43.32 KG/M2 | WEIGHT: 276 LBS | RESPIRATION RATE: 16 BRPM | SYSTOLIC BLOOD PRESSURE: 121 MMHG | DIASTOLIC BLOOD PRESSURE: 76 MMHG | TEMPERATURE: 97.5 F | HEIGHT: 67 IN | HEART RATE: 55 BPM | OXYGEN SATURATION: 97 %

## 2024-01-17 DIAGNOSIS — T63.444D: ICD-10-CM

## 2024-01-17 DIAGNOSIS — Z71.84 TRAVEL ADVICE ENCOUNTER: Primary | ICD-10-CM

## 2024-01-17 PROCEDURE — 90471 IMMUNIZATION ADMIN: CPT | Performed by: NURSE PRACTITIONER

## 2024-01-17 PROCEDURE — 90746 HEPB VACCINE 3 DOSE ADULT IM: CPT | Mod: GA | Performed by: NURSE PRACTITIONER

## 2024-01-17 PROCEDURE — 99403 PREV MED CNSL INDIV APPRX 45: CPT | Mod: 25 | Performed by: NURSE PRACTITIONER

## 2024-01-17 PROCEDURE — 90472 IMMUNIZATION ADMIN EACH ADD: CPT | Mod: GA | Performed by: NURSE PRACTITIONER

## 2024-01-17 PROCEDURE — 90686 IIV4 VACC NO PRSV 0.5 ML IM: CPT | Performed by: NURSE PRACTITIONER

## 2024-01-17 PROCEDURE — 90691 TYPHOID VACCINE IM: CPT | Mod: GA | Performed by: NURSE PRACTITIONER

## 2024-01-17 RX ORDER — ATOVAQUONE AND PROGUANIL HYDROCHLORIDE 250; 100 MG/1; MG/1
1 TABLET, FILM COATED ORAL DAILY
Qty: 15 TABLET | Refills: 0 | Status: SHIPPED | OUTPATIENT
Start: 2024-01-17 | End: 2024-09-23

## 2024-01-17 RX ORDER — EPINEPHRINE 0.3 MG/.3ML
0.3 INJECTION SUBCUTANEOUS
Qty: 2 ML | Refills: 0 | Status: SHIPPED | OUTPATIENT
Start: 2024-01-17

## 2024-01-17 RX ORDER — AZITHROMYCIN 500 MG/1
500 TABLET, FILM COATED ORAL DAILY
Qty: 3 TABLET | Refills: 0 | Status: SHIPPED | OUTPATIENT
Start: 2024-01-17 | End: 2024-01-20

## 2024-01-17 ASSESSMENT — PAIN SCALES - GENERAL: PAINLEVEL: NO PAIN (0)

## 2024-01-17 NOTE — PATIENT INSTRUCTIONS
Thank you for visiting the [facility] : [phone]  Today January 17, 2024 you received the    Flu Vaccine    Hepatitis B Vaccine - Please return on 2/16/24 for your 2nd dose and 7/15/24 or later for your 3rd and final dose.    Typhoid - injectable. This vaccine is valid for two years.     Follow up vaccine appointments can be made as a NURSE ONLY visit at the Travel Clinic, (BE PREPARED TO WAIT, ) or at designated [facility].    If you are receiving the Rabies vaccines series, it is important that you follow the exact schedule ordered.     Pre-travel     We recommend that you purchase Medical Evacuation Insurance prior to your departure.  Https://wwwnc.cdc.gov/travel/page/insurance    [place] your travel plans with the [place] Department of State through STEP ( Smart Traveler Enrollment Program ) https://step.state.gov.  STEP is a free service to allow U[place] citizens and nationals traveling and living abroad to enroll their trip with the nearest U.S. Embassy or Consulate.    Animal Exposure: Avoid all mammals even if they look healthy.  If there is a bite, scratch or even a lick, wash area immediately with soap and water for 15 minutes and seek medical care within 24 hours for evaluation of Rabies post exposure treatment.  Contact your Medical Evacuation Insurance.    Repiratory illness prevention strategies ( Covid and Influenza ) CDC recommendations:  Consider wearing a mask in crowded or poorly ventilated indoor areas, including on public transportation and in transportation hubs.  Hand washing: frequent, thorough handwashing with soap and water for 20 seconds. Use an alcohol-based hand  with at least 60% alcohol if soap and water are not readily available. Avoid touching face, nose, eyes, mouth unless you have done appropriate hand washing as above.  VACCINES: Staying up to date on COVID-19 vaccines significantly lowers the risk of getting very sick, being  hospitalized, or dying from COVID-19. CDC recommends that everyone stay up to date on their COVID-19 vaccines, especially people with weakened immune systems.    Travel Covid 19 Testing:  updated 12/06/2021  International travelers: Pre-travel:  See country specific Embassy websites or airline websites.    Post travel: CDC recommends getting tested 3-5 days after your trip     Post-travel illness:  Contact your provider or [facility] if you develop a fever, rash, cough, diarrhea or other symptoms for up to 1 year after travel.  Inform your healthcare provider when and where you traveled to.    Please call the [facility] with any questions [phone]  Or send your provider a 'My Chart' note.

## 2024-01-17 NOTE — PROGRESS NOTES
"Nurse Note ( Pre-Travel Consult)    Itinerary:  [place]     Departure Date: 02/03    Return Date: 02/23    Length of Trip 3 weeks    Reason for Travel: Tourism         Urban or rural: both    Accommodations: Hotel  Family home        IMMUNIZATION HISTORY  Have you received any immunizations within the past 4 weeks?  No  Have you ever fainted from having your blood drawn or from an injection?  No  Have you ever had a fever reaction to vaccination?  No  Have you ever had any bad reaction or side effect from any vaccination?  No  Have you ever had hepatitis A or B vaccine?  Yes  Do you live (or work closely) with anyone who has AIDS, an AIDS-like condition, any other immune disorder or who is on chemotherapy for cancer?  No  Do you have a family history of immunodeficiency?  No  Have you received any injection of immune globulin or any blood products during the past 12 months?  No    Patient roomed by Juan Kim is a 53 year old male seen today alone for counsultation for international travel.   Patient will be departing in  2.5 week(s) and  traveling alone.      Patient itinerary :  will be in the [facility]  which risk for Malaria, Dengue Fever, Rabies, and food borne illnesses. exposure.      Patient's activities will include sightseeing, beach activities (salt water), and visiting friend.    Patient's country of birth is [place]      Special medical concerns: Bee allergy  Pre-travel questionnaire was completed by patient and reviewed by provider.     Vitals: /76   Pulse 55   Temp 97.5  F (36.4  C) (Temporal)   Resp 16   Ht 1.702 m (5' 7\")   Wt 125.2 kg (276 lb)   SpO2 97%   BMI 43.23 kg/m    BMI= Body mass index is 43.23 kg/m .    EXAM:  General:  Well-nourished, well-developed in no acute distress.  Appears to be stated age, interacts appropriately and expresses understanding of information given to patient.    Current Outpatient Medications "   Medication Sig Dispense Refill    EPINEPHrine (EPIPEN/ADRENACLICK/OR ANY BX GENERIC EQUIV) 0.3 MG/0.3ML injection 2-pack Inject 0.3 mLs (0.3 mg) into the muscle once as needed for anaphylaxis 2 mL 0    HYDROcodone-acetaminophen (NORCO) 5-325 MG tablet Take 1 tablet by mouth every 8 hours as needed for severe pain (7-10) 10 tablet 0    sildenafil (VIAGRA) 100 MG tablet Take 1 tablet (100 mg) by mouth daily as needed (ED) 10 tablet 3    tadalafil (CIALIS) 5 MG tablet Take 1 tablet (5 mg) by mouth every 24 hours 90 tablet 3    benzonatate (TESSALON) 200 MG capsule Take 1 capsule (200 mg) by mouth 3 times daily as needed for cough (Patient not taking: Reported on 1/17/2024) 25 capsule 0    clomiPHENE (CLOMID) 50 MG tablet Take 50 mg by mouth daily (Patient not taking: Reported on 1/17/2024)      sulfamethoxazole-trimethoprim (BACTRIM DS) 800-160 MG tablet Take 1 tablet by mouth 2 times daily (Patient not taking: Reported on 1/17/2024) 20 tablet 1     Patient Active Problem List   Diagnosis    Tobacco abuse    Presbyopia OU    Hypertriglyceridemia    Impotence    Allergic reaction    HNP (herniated nucleus pulposus), lumbar    Anal fissure    CARDIOVASCULAR SCREENING; LDL GOAL LESS THAN 130    Vitamin deficiency    Esophageal reflux (GERD)    BMI 45.0-49.9, adult (H)    Myalgia    Trigger finger, acquired    Lipoma of skin and subcutaneous tissue    Lymphedema     Allergies   Allergen Reactions    Bees Swelling    Chocolate          Immunizations discussed include:   Covid 19: Declined  Not concerned about risk of disease  Hepatitis A:  Up to date  Hepatitis B: Ordered/given today, risks, benefits and side effects reviewed  Influenza: Ordered/given today, risks, benefits and side effects reviewed  Typhoid: Ordered/given today, risks, benefits and side effects reviewed  Rabies: Declined  reviewed managment of a animal bite or scratch (washing wound, seek medical care within 24 hours for post exposure prophylaxis  )  Yellow Fever: Not indicated  Japanese Encephalitis: Not indicated - risk of disease is minimal declined  Meningococcus: Not indicated  Tetanus/Diphtheria: Up to date  Measles/Mumps/Rubella: declined titer or vaccine  Cholera: Not needed  Polio: Declined  Not concerned about risk of disease  Pneumococcal: Under age of 65  Varicella: Immune by disease history per patient report  Shingrix: deferred  HPV:  Not indicated     TB: low risk    Altitude Exposure on this trip: no  Past tolerance to Altitude: na    ASSESSMENT/PLAN:  Salvatore was seen today for travel clinic.    Diagnoses and all orders for this visit:    Travel advice encounter  -     atovaquone-proguanil (MALARONE) 250-100 MG tablet; Take 1 tablet by mouth daily Start 2 days before exposure to Malaria and continue daily till  7 days after exposure.  -     azithromycin (ZITHROMAX) 500 MG tablet; Take 1 tablet (500 mg) by mouth daily for 3 doses Take 1 tablet a day for up to 3 days for severe diarrhea    Systemic reaction to bee sting, undetermined intent, subsequent encounter  -     EPINEPHrine (ANY BX GENERIC EQUIV) 0.3 MG/0.3ML injection 2-pack; Inject 0.3 mLs (0.3 mg) into the muscle once as needed for anaphylaxis    Other orders  -     TYPHOID VACCINE, IM  -     INFLUENZA VACCINE >6 MONTHS (AFLURIA/FLUZONE)  -     HEPATITIS B, ADULT 20+ (ENGERIX-B/RECOMBIVAX HB); Standing  -     HEPATITIS B, ADULT 20+ (ENGERIX-B/RECOMBIVAX HB)      I have reviewed general recommendations for safe travel   including: food/water precautions, insect precautions, safer sex   practices given high prevalence of Zika, HIV and other STDs,   roadway safety. Educational materials and [employer] report provided.    Malaraia prophylaxis recommended: Malarone for [facility]  Symptomatic treatment for traveler's diarrhea: azithromycin        Evacuation insurance advised and resources were provided to patient.    Total visit time 30 minutes  with over 50% of time spent counseling patient and  shared decision making as detailed above.    Angie Randolph CNP  Certificate in Travel Health    Has had severe reaction to Bee sting involving swelling and Epi infusion   Needs refill of Epi Pen prior to travel  Discussed Benedryl availability     Systemic reaction to bee sting, undetermined intent, subsequent encounter  -     EPINEPHrine (ANY BX GENERIC EQUIV) 0.3 MG/0.3ML injection 2-pack; Inject 0.3 mLs (0.3 mg) into the muscle once as needed for anaphylaxis    Additional 10 minutes on discussion , chart review.     Angie Randolph (Lori) CNP  [facility]  Certificate in Travel Health

## 2024-03-01 ENCOUNTER — OFFICE VISIT (OUTPATIENT)
Dept: URGENT CARE | Facility: URGENT CARE | Age: 54
End: 2024-03-01
Payer: COMMERCIAL

## 2024-03-01 VITALS
OXYGEN SATURATION: 96 % | DIASTOLIC BLOOD PRESSURE: 82 MMHG | HEART RATE: 57 BPM | BODY MASS INDEX: 43.1 KG/M2 | WEIGHT: 275.2 LBS | SYSTOLIC BLOOD PRESSURE: 133 MMHG | TEMPERATURE: 98.5 F

## 2024-03-01 DIAGNOSIS — B96.89 ACUTE BACTERIAL SINUSITIS: Primary | ICD-10-CM

## 2024-03-01 DIAGNOSIS — J01.90 ACUTE BACTERIAL SINUSITIS: Primary | ICD-10-CM

## 2024-03-01 PROCEDURE — 99213 OFFICE O/P EST LOW 20 MIN: CPT | Performed by: STUDENT IN AN ORGANIZED HEALTH CARE EDUCATION/TRAINING PROGRAM

## 2024-03-01 NOTE — PROGRESS NOTES
Assessment & Plan     Acute bacterial sinusitis  Will treat patient with supportive and symptomatic measures for sinusitis at this time which include: Fluids, rest, over-the-counter medications: decongestants, antihistamines,  and expectorants, side effects of medications reviewed.   I discussed with patient that I suspect their symptoms are related to a bacterial sinusitis, however patient states they would like to start antibiotics now as that is what has worked for them in the past, thus prescription sent, side effects of medications reviewed. Educated patient if no improvement on the antibiotics to consider alternative sinusitis such as viral or allergic and other treatment modalities  Additionally we discussed if symptoms do not improve after starting today's treatment (or if symptoms worsen) to follow up in 5-7 days.   - amoxicillin-clavulanate (AUGMENTIN) 875-125 MG tablet  Dispense: 10 tablet; Refill: 0     12 minutes spent by me on the date of the encounter doing chart review, history and exam, documentation and further activities per the note. Billed based on complexity of care.     No follow-ups on file.    Chantelle Selby MD  [facility] [employer]    Licha Chase is a 53 year old male who presents to clinic today for the following health issues:  Chief Complaint   Patient presents with    Sinus Problem     Onset Sunday, possible sinus infection, ear plugge, facial pain     HPI    Just got back in town from the [facility], ears totally plugged. Pain in front of his eye, L>R. COVID test earlier this week was negative. Lightheadedness, decreased hearing, decreased taste.     URI Adult    Onset of symptoms was 6 day(s) ago.  Course of illness is worsening.    Severity moderate  Current and Associated symptoms: sweats, runny nose, stuffy nose, cough - productive, wheezing, sore throat, hoarse voice, facial pain/pressure, headache, fatigue, and both ears are plugged  Treatment  measures tried include Dayquil/Nyquil  Predisposing factors include ill contact: recently long plane travel and seasonal allergies- summer.    Review of Systems  Constitutional, HEENT, cardiovascular, pulmonary, gi and gu systems are negative, except as otherwise noted.       Objective    /82 (BP Location: Left arm, Patient Position: Sitting, Cuff Size: Adult Large)   Pulse 57   Temp 98.5  F (36.9  C) (Tympanic)   Wt 124.8 kg (275 lb 3.2 oz)   SpO2 96%   BMI 43.10 kg/m    Physical Exam   GENERAL: alert and no distress  EYES: Eyes grossly normal to inspection, PERRL and conjunctivae and sclerae normal  HENT: normal cephalic/atraumatic, ear canals and TM's normal, nose and mouth without ulcers or lesions, nasal mucosa edematous , rhinorrhea yellow, green, and thick, oropharynx clear, oral mucous membranes moist, and sinuses: maxillary, frontal tenderness on both sides, maxillary, frontal swelling on both sides  NECK: bilateral anterior cervical adenopathy and no asymmetry, masses, or scars  RESP: lungs clear to auscultation - no rales, rhonchi or wheezes  CV: regular rate and rhythm, normal S1 S2, no S3 or S4, no murmur, click or rub, no peripheral edema  MS: no gross musculoskeletal defects noted, no edema  SKIN: no suspicious lesions or rashes

## 2024-05-28 NOTE — PROGRESS NOTES
ASSESSMENT & PLAN    Salvatore was seen today for pain and pain.    Diagnoses and all orders for this visit:    Arthralgia of both hands  -     XR Hand Bilateral G/E 3 Views; Future    Trigger finger of right thumb  -     Orthopedic  Referral; Future  -     Hand / Upper Extremity Injection/Arthrocentesis - Right Thumb & Ring Finger A1 Pulley    Trigger ring finger of right hand  -     Orthopedic  Referral; Future  -     Hand / Upper Extremity Injection/Arthrocentesis - Right Thumb & Ring Finger A1 Pulley    Trigger middle finger of left hand  -     Orthopedic  Referral; Future  -     Hand / Upper Extremity Injection/Arthrocentesis: L long A1    Trigger middle finger of right hand  -     Orthopedic  Referral; Future        See Patient Instructions  Patient Instructions   Reviewed multiple trigger fingers today.  Injections done for the more symptomatic right thumb, right ring finger, and left long finger triggering digits.  Reviewed splint use, suggest overnight splint use for comfort as able.  We also discussed potential for referral on to discuss further with orthopedic surgery.  Given past injections to the right long finger twice, with recurrent symptoms, as well as with questions about multiple triggering digits, went ahead and placed referral for orthopedic surgery discussion next.  He can schedule when desired, though I would suggest that if wanting discussion about multiple triggering digits, await results of the injections first.    If you have any further questions for your physician or physician s care team you can contact them thru [employer] or by calling [phone].        Injection Discharge Instructions    You may shower, however avoid swimming, tub baths or hot tubs for 24 hours following your procedure  You may have a mild to moderate increase in pain for several days following the injection.  It may take up to 14 days for the steroid medication to start working  although you may feel the effect as early as a few days after the procedure.  You may use ice packs for 10-15 minutes, 3 to 4 times a day at the injection site for comfort  You may use anti-inflammatory medications (such as Ibuprofen or Aleve or Advil) if you are able to take safely, or acetaminophen (Tylenol) for pain control if necessary  If you were fasting, you may resume your normal diet and medications after the procedure  If you have diabetes, check your blood sugar more frequently than usual as your blood sugar may be higher than normal for 10-14 days following a steroid injection. Contact your doctor who manages your diabetes if your blood sugar is higher than usual    If you experience any of the following, call the clinic @ [phone]  - Fever over 100 degree F  - Swelling, bleeding, redness, drainage, warmth at the injection site  - New or worsening pain      Behzad Corona [facility] [place]    -----  Chief Complaint   Patient presents with    Right Hand - Pain    Left Hand - Pain       SUBJECTIVE  Salvatore Kim is a/an 54 year old male who is seen as a self referral for evaluation of generalized bilateral hand pain with multiple trigger finger complaints.    The patient is seen by themselves.  The patient is right handed    Onset: 2 - 3 month(s) ago. Reports insidious onset without acute precipitating event.  Location of Pain: bilateral hand, right thumb, long, ring fingers are worst; left long & ring fingers  Worsened by: sleeping, waking in AM, gripping/grasping, thumb flexion  Better with: daily movement  Treatments tried: rest/activity avoidance and heat  Associated symptoms: AM joint stiffness, trigger finger (multiple)    Orthopedic/Surgical history: YES - history of multiple trigger fingers, (right long injection x2, last in 2022), left long finger (last injected 2021), status post right index finger release in 2020 by Dr Ruel Caldera  "History/Occupation: work as     **  Injections:  Right long A1 shaina 5/20/22 Dr Mclaughlin  Left long A1 shaina 12/10/21 Dr Mclaughlin  Right long A1 shaina 2/17/21 Dr Mclaughlin    Right index A1 shaina 1/9/20 Dr Lipscomb    Right index trigger finger release July 2020    Left long triggering currently.  Can awake in morning and fingers quite stiff.            Currently: right thumb trigger, right ring and small fingers. Also left long.      REVIEW OF SYSTEMS:  Review of Systems    OBJECTIVE:  Ht 1.702 m (5' 7\")   Wt 132.5 kg (292 lb)   BMI 45.73 kg/m           Hand/wrist (bilateral):    Inspection:  No deformity noted.  No swelling. No ecchymosis.    Motion:    Digit motion with some pain with affected digit motion, some triggering noted of digits    Sensation:  Grossly intact light touch.    Radial pulses normal, +2/4, capillary refill brisk.    Palpation:  Tender A1 pulley area of affected digits, palmar base of digits        RADIOLOGY:  Final results and radiologist's interpretation, available in the [facility] health record.  Images were reviewed with the patient in the office today.  My personal interpretation of the performed imaging: no acute bony abnormality noted. Scattered degenerative changes.          XR Hand Bilateral G/E 3 Views    Narrative    HAND THREE VIEWS BILATERAL  6/5/2024 4:08 PM     HISTORY: Arthralgia of both hands  COMPARISON: None.      Impression    IMPRESSION: No acute fracture or malalignment. Minimal to mild  degenerative changes throughout the hands bilaterally.    ARTUR BOWENS MD         SYSTEM ID:  [id number]         Hand / Upper Extremity Injection/Arthrocentesis - Right Thumb & Ring Finger A1 Pulley    Date/Time: 6/5/2024 5:15 PM    Performed by: Behzad Corona DO  Authorized by: Behzad Corona DO    Indications:  Pain and therapeutic  Needle Size:  25 G  Guidance: landmark    Approach:  Volar  Condition: trigger finger     Location comment:  Right Thumb & Ring Finger " A1 Pulley    Medications:  20 mg triamcinolone 40 MG/ML; 0.5 mL lidocaine 1 %  Outcome:  Tolerated well, no immediate complications  Procedure discussed: discussed risks, benefits, and alternatives    Consent Given by:  Patient  Timeout: timeout called immediately prior to procedure    Prep: patient was prepped and draped in usual sterile fashion     The above mixture was spread equally between the right thumb A1 pulley & right ring finger A1 pulley.  Hand / Upper Extremity Injection/Arthrocentesis: L long A1    Date/Time: 6/5/2024 5:20 PM    Performed by: Behzad Corona DO  Authorized by: Behzad Corona DO    Indications:  Pain and therapeutic  Needle Size:  25 G  Guidance: landmark    Approach:  Volar  Condition: trigger finger    Location:  Long finger    Site:  L long A1  Medications:  10 mg triamcinolone 40 MG/ML; 0.25 mL lidocaine 1 %  Outcome:  Tolerated well, no immediate complications  Procedure discussed: discussed risks, benefits, and alternatives    Consent Given by:  Patient  Timeout: timeout called immediately prior to procedure    Prep: patient was prepped and draped in usual sterile fashion            30 minutes spent by me on the date of the encounter doing chart review, history and exam, documentation and further activities per the note, not including injections

## 2024-06-02 ENCOUNTER — HEALTH MAINTENANCE LETTER (OUTPATIENT)
Age: 54
End: 2024-06-02

## 2024-06-05 ENCOUNTER — OFFICE VISIT (OUTPATIENT)
Dept: ORTHOPEDICS | Facility: CLINIC | Age: 54
End: 2024-06-05
Payer: COMMERCIAL

## 2024-06-05 ENCOUNTER — ANCILLARY PROCEDURE (OUTPATIENT)
Dept: GENERAL RADIOLOGY | Facility: CLINIC | Age: 54
End: 2024-06-05
Attending: PEDIATRICS
Payer: COMMERCIAL

## 2024-06-05 VITALS — BODY MASS INDEX: 45.83 KG/M2 | HEIGHT: 67 IN | WEIGHT: 292 LBS

## 2024-06-05 DIAGNOSIS — M65.331 TRIGGER MIDDLE FINGER OF RIGHT HAND: ICD-10-CM

## 2024-06-05 DIAGNOSIS — M65.332 TRIGGER MIDDLE FINGER OF LEFT HAND: ICD-10-CM

## 2024-06-05 DIAGNOSIS — M65.311 TRIGGER FINGER OF RIGHT THUMB: ICD-10-CM

## 2024-06-05 DIAGNOSIS — M25.542 ARTHRALGIA OF BOTH HANDS: ICD-10-CM

## 2024-06-05 DIAGNOSIS — M25.541 ARTHRALGIA OF BOTH HANDS: Primary | ICD-10-CM

## 2024-06-05 DIAGNOSIS — M65.341 TRIGGER RING FINGER OF RIGHT HAND: ICD-10-CM

## 2024-06-05 DIAGNOSIS — M25.541 ARTHRALGIA OF BOTH HANDS: ICD-10-CM

## 2024-06-05 DIAGNOSIS — M25.542 ARTHRALGIA OF BOTH HANDS: Primary | ICD-10-CM

## 2024-06-05 PROCEDURE — 73130 X-RAY EXAM OF HAND: CPT | Mod: TC | Performed by: RADIOLOGY

## 2024-06-05 PROCEDURE — 20550 NJX 1 TENDON SHEATH/LIGAMENT: CPT | Mod: 50 | Performed by: PEDIATRICS

## 2024-06-05 PROCEDURE — 99214 OFFICE O/P EST MOD 30 MIN: CPT | Mod: 25 | Performed by: PEDIATRICS

## 2024-06-05 RX ADMIN — LIDOCAINE HYDROCHLORIDE 0.25 ML: 10 INJECTION, SOLUTION INFILTRATION; PERINEURAL at 17:20

## 2024-06-05 RX ADMIN — TRIAMCINOLONE ACETONIDE 20 MG: 40 INJECTION, SUSPENSION INTRA-ARTICULAR; INTRAMUSCULAR at 17:15

## 2024-06-05 RX ADMIN — TRIAMCINOLONE ACETONIDE 10 MG: 40 INJECTION, SUSPENSION INTRA-ARTICULAR; INTRAMUSCULAR at 17:20

## 2024-06-05 RX ADMIN — LIDOCAINE HYDROCHLORIDE 0.5 ML: 10 INJECTION, SOLUTION INFILTRATION; PERINEURAL at 17:15

## 2024-06-05 NOTE — Clinical Note
6/5/2024      Salvatore Kim  [address] [facility]  [place] [address]      Dear Colleague,    Thank you for referring your patient, Salvatore Kim, to the [facility] [place]. Please see a copy of my visit note below.    ASSESSMENT & PLAN    Salvatore was seen today for pain and pain.    Diagnoses and all orders for this visit:    Arthralgia of both hands  -     XR Hand Bilateral G/E 3 Views; Future    Trigger finger of right thumb  -     Orthopedic  Referral; Future  -     Hand / Upper Extremity Injection/Arthrocentesis - Right Thumb & Ring Finger A1 Pulley    Trigger ring finger of right hand  -     Orthopedic  Referral; Future  -     Hand / Upper Extremity Injection/Arthrocentesis - Right Thumb & Ring Finger A1 Pulley    Trigger middle finger of left hand  -     Orthopedic  Referral; Future  -     Hand / Upper Extremity Injection/Arthrocentesis: L long A1    Trigger middle finger of right hand  -     Orthopedic  Referral; Future        See Patient Instructions  Patient Instructions   Reviewed multiple trigger fingers today.  Injections done for the more symptomatic right thumb, right ring finger, and left long finger triggering digits.  Reviewed splint use, suggest overnight splint use for comfort as able.  We also discussed potential for referral on to discuss further with orthopedic surgery.  Given past injections to the right long finger twice, with recurrent symptoms, as well as with questions about multiple triggering digits, went ahead and placed referral for orthopedic surgery discussion next.  He can schedule when desired, though I would suggest that if wanting discussion about multiple triggering digits, await results of the injections first.    If you have any further questions for your physician or physician s care team you can contact them thru [employer] or by calling [phone].        Injection Discharge Instructions    You may shower, however  avoid swimming, tub baths or hot tubs for 24 hours following your procedure  You may have a mild to moderate increase in pain for several days following the injection.  It may take up to 14 days for the steroid medication to start working although you may feel the effect as early as a few days after the procedure.  You may use ice packs for 10-15 minutes, 3 to 4 times a day at the injection site for comfort  You may use anti-inflammatory medications (such as Ibuprofen or Aleve or Advil) if you are able to take safely, or acetaminophen (Tylenol) for pain control if necessary  If you were fasting, you may resume your normal diet and medications after the procedure  If you have diabetes, check your blood sugar more frequently than usual as your blood sugar may be higher than normal for 10-14 days following a steroid injection. Contact your doctor who manages your diabetes if your blood sugar is higher than usual    If you experience any of the following, call the clinic @ [phone]  - Fever over 100 degree F  - Swelling, bleeding, redness, drainage, warmth at the injection site  - New or worsening pain      Behzad Corona [facility] [place]    -----  Chief Complaint   Patient presents with    Right Hand - Pain    Left Hand - Pain       SUBJECTIVE  Salvatore Kim is a/an 54 year old male who is seen as a self referral for evaluation of generalized bilateral hand pain with multiple trigger finger complaints.    The patient is seen by themselves.  The patient is right handed    Onset: 2 - 3 month(s) ago. Reports insidious onset without acute precipitating event.  Location of Pain: bilateral hand, right thumb, long, ring fingers are worst; left long & ring fingers  Worsened by: sleeping, waking in AM, gripping/grasping, thumb flexion  Better with: daily movement  Treatments tried: rest/activity avoidance and heat  Associated symptoms: AM joint stiffness, trigger finger  "(multiple)    Orthopedic/Surgical history: YES - history of multiple trigger fingers, (right long injection x2, last in 2022), left long finger (last injected 2021), status post right index finger release in 2020 by Dr Lipscomb  Social History/Occupation: work as     **  Injections:  Right long A1 shaina 5/20/22 Dr Mclaughlin  Left long A1 shaina 12/10/21 Dr Mclaughlin  Right long A1 shaina 2/17/21 Dr Mclaughlin    Right index A1 shaina 1/9/20 Dr Lipscomb    Right index trigger finger release July 2020    Left long triggering currently.  Can awake in morning and fingers quite stiff.            Currently: right thumb trigger, right ring and small fingers. Also left long.      REVIEW OF SYSTEMS:  Review of Systems    OBJECTIVE:  Ht 1.702 m (5' 7\")   Wt 132.5 kg (292 lb)   BMI 45.73 kg/m           Hand/wrist (bilateral):    Inspection:  No deformity noted.  No swelling. No ecchymosis.    Motion:    Digit motion with some pain with affected digit motion, some triggering noted of digits    Sensation:  Grossly intact light touch.    Radial pulses normal, +2/4, capillary refill brisk.    Palpation:  Tender A1 pulley area of affected digits, palmar base of digits        RADIOLOGY:  Final results and radiologist's interpretation, available in the [facility] health record.  Images were reviewed with the patient in the office today.  My personal interpretation of the performed imaging: no acute bony abnormality noted. Scattered degenerative changes.          XR Hand Bilateral G/E 3 Views    Narrative    HAND THREE VIEWS BILATERAL  6/5/2024 4:08 PM     HISTORY: Arthralgia of both hands  COMPARISON: None.      Impression    IMPRESSION: No acute fracture or malalignment. Minimal to mild  degenerative changes throughout the hands bilaterally.    ARTUR BOWENS MD         SYSTEM ID:  [id number]         Hand / Upper Extremity Injection/Arthrocentesis - Right Thumb & Ring Finger A1 Pulley    Date/Time: 6/5/2024 5:15 PM    Performed by: " Behzad Corona DO  Authorized by: Behzad Corona DO    Indications:  Pain and therapeutic  Needle Size:  25 G  Guidance: landmark    Approach:  Volar  Condition: trigger finger     Location comment:  Right Thumb & Ring Finger A1 Pulley    Medications:  20 mg triamcinolone 40 MG/ML; 0.5 mL lidocaine 1 %  Outcome:  Tolerated well, no immediate complications  Procedure discussed: discussed risks, benefits, and alternatives    Consent Given by:  Patient  Timeout: timeout called immediately prior to procedure    Prep: patient was prepped and draped in usual sterile fashion     The above mixture was spread equally between the right thumb A1 pulley & right ring finger A1 pulley.  Hand / Upper Extremity Injection/Arthrocentesis: L long A1    Date/Time: 6/5/2024 5:20 PM    Performed by: Behzad Corona DO  Authorized by: Behzad Corona DO    Indications:  Pain and therapeutic  Needle Size:  25 G  Guidance: landmark    Approach:  Volar  Condition: trigger finger    Location:  Long finger    Site:  L long A1  Medications:  10 mg triamcinolone 40 MG/ML; 0.25 mL lidocaine 1 %  Outcome:  Tolerated well, no immediate complications  Procedure discussed: discussed risks, benefits, and alternatives    Consent Given by:  Patient  Timeout: timeout called immediately prior to procedure    Prep: patient was prepped and draped in usual sterile fashion            30 minutes spent by me on the date of the encounter doing chart review, history and exam, documentation and further activities per the note, not including injections           Again, thank you for allowing me to participate in the care of your patient.        Sincerely,        Behzad Corona DO

## 2024-06-05 NOTE — PATIENT INSTRUCTIONS
Reviewed multiple trigger fingers today.  Injections done for the more symptomatic right thumb, right ring finger, and left long finger triggering digits.  Reviewed splint use, suggest overnight splint use for comfort as able.  We also discussed potential for referral on to discuss further with orthopedic surgery.  Given past injections to the right long finger twice, with recurrent symptoms, as well as with questions about multiple triggering digits, went ahead and placed referral for orthopedic surgery discussion next.  He can schedule when desired, though I would suggest that if wanting discussion about multiple triggering digits, await results of the injections first.    If you have any further questions for your physician or physician s care team you can contact them thru [employer] or by calling [phone].        Injection Discharge Instructions    You may shower, however avoid swimming, tub baths or hot tubs for 24 hours following your procedure  You may have a mild to moderate increase in pain for several days following the injection.  It may take up to 14 days for the steroid medication to start working although you may feel the effect as early as a few days after the procedure.  You may use ice packs for 10-15 minutes, 3 to 4 times a day at the injection site for comfort  You may use anti-inflammatory medications (such as Ibuprofen or Aleve or Advil) if you are able to take safely, or acetaminophen (Tylenol) for pain control if necessary  If you were fasting, you may resume your normal diet and medications after the procedure  If you have diabetes, check your blood sugar more frequently than usual as your blood sugar may be higher than normal for 10-14 days following a steroid injection. Contact your doctor who manages your diabetes if your blood sugar is higher than usual    If you experience any of the following, call the clinic @ [phone]  - Fever over 100 degree F  - Swelling, bleeding, redness,  drainage, warmth at the injection site  - New or worsening pain

## 2024-06-23 RX ORDER — TRIAMCINOLONE ACETONIDE 40 MG/ML
10 INJECTION, SUSPENSION INTRA-ARTICULAR; INTRAMUSCULAR
Status: SHIPPED | OUTPATIENT
Start: 2024-06-05

## 2024-06-23 RX ORDER — LIDOCAINE HYDROCHLORIDE 10 MG/ML
0.5 INJECTION, SOLUTION INFILTRATION; PERINEURAL
Status: SHIPPED | OUTPATIENT
Start: 2024-06-05

## 2024-06-23 RX ORDER — TRIAMCINOLONE ACETONIDE 40 MG/ML
20 INJECTION, SUSPENSION INTRA-ARTICULAR; INTRAMUSCULAR
Status: SHIPPED | OUTPATIENT
Start: 2024-06-05

## 2024-06-23 RX ORDER — LIDOCAINE HYDROCHLORIDE 10 MG/ML
0.25 INJECTION, SOLUTION INFILTRATION; PERINEURAL
Status: SHIPPED | OUTPATIENT
Start: 2024-06-05

## 2024-07-08 ENCOUNTER — OFFICE VISIT (OUTPATIENT)
Dept: FAMILY MEDICINE | Facility: CLINIC | Age: 54
End: 2024-07-08
Payer: COMMERCIAL

## 2024-07-08 VITALS
BODY MASS INDEX: 46.62 KG/M2 | DIASTOLIC BLOOD PRESSURE: 71 MMHG | WEIGHT: 297 LBS | HEART RATE: 60 BPM | HEIGHT: 67 IN | SYSTOLIC BLOOD PRESSURE: 124 MMHG | OXYGEN SATURATION: 94 % | TEMPERATURE: 97.1 F | RESPIRATION RATE: 18 BRPM

## 2024-07-08 DIAGNOSIS — E66.813 CLASS 3 SEVERE OBESITY WITH BODY MASS INDEX (BMI) OF 45.0 TO 49.9 IN ADULT, UNSPECIFIED OBESITY TYPE, UNSPECIFIED WHETHER SERIOUS COMORBIDITY PRESENT (H): ICD-10-CM

## 2024-07-08 DIAGNOSIS — D17.30 LIPOMA OF SKIN AND SUBCUTANEOUS TISSUE: ICD-10-CM

## 2024-07-08 DIAGNOSIS — N52.03 COMBINED ARTERIAL INSUFFICIENCY AND CORPORO-VENOUS OCCLUSIVE ERECTILE DYSFUNCTION: ICD-10-CM

## 2024-07-08 DIAGNOSIS — E66.01 CLASS 3 SEVERE OBESITY WITH BODY MASS INDEX (BMI) OF 45.0 TO 49.9 IN ADULT, UNSPECIFIED OBESITY TYPE, UNSPECIFIED WHETHER SERIOUS COMORBIDITY PRESENT (H): ICD-10-CM

## 2024-07-08 PROCEDURE — 99214 OFFICE O/P EST MOD 30 MIN: CPT | Mod: 25 | Performed by: FAMILY MEDICINE

## 2024-07-08 PROCEDURE — 90746 HEPB VACCINE 3 DOSE ADULT IM: CPT | Performed by: FAMILY MEDICINE

## 2024-07-08 PROCEDURE — 90472 IMMUNIZATION ADMIN EACH ADD: CPT | Performed by: FAMILY MEDICINE

## 2024-07-08 PROCEDURE — 90750 HZV VACC RECOMBINANT IM: CPT | Performed by: FAMILY MEDICINE

## 2024-07-08 PROCEDURE — 90471 IMMUNIZATION ADMIN: CPT | Performed by: FAMILY MEDICINE

## 2024-07-08 RX ORDER — TADALAFIL 5 MG/1
5 TABLET ORAL EVERY 24 HOURS
Qty: 90 TABLET | Refills: 3 | Status: SHIPPED | OUTPATIENT
Start: 2024-07-08

## 2024-07-08 RX ORDER — OMEPRAZOLE 20 MG/1
20 TABLET, DELAYED RELEASE ORAL DAILY
COMMUNITY

## 2024-07-08 ASSESSMENT — PAIN SCALES - GENERAL: PAINLEVEL: NO PAIN (0)

## 2024-07-08 NOTE — PROGRESS NOTES
"  Assessment & Plan     Class 3 severe obesity with body mass index (BMI) of 45.0 to 49.9 in adult, unspecified obesity type, unspecified whether serious comorbidity present (H)  Body mass index is 46.52 kg/m .  Wt Readings from Last 4 Encounters:   07/08/24 134.7 kg (297 lb)   06/05/24 132.5 kg (292 lb)   03/01/24 124.8 kg (275 lb 3.2 oz)   01/17/24 125.2 kg (276 lb)   Unable to lose weight on his own.Reviewed principles of energy metabolism, caloric intake and   expenditure, and rationale for treatment program. Also reinforced   need for reduced calorie, low fat diet and increased physical   activity.  Will start Wegovy with titration dose.  Discussed side effects.      - Semaglutide-Weight Management (WEGOVY) 0.25 MG/0.5ML pen; Inject 0.25 mg Subcutaneous once a week  - Semaglutide-Weight Management (WEGOVY) 0.5 MG/0.5ML pen; Inject 0.5 mg Subcutaneous once a week  - Semaglutide-Weight Management (WEGOVY) 1 MG/0.5ML pen; Inject 1 mg Subcutaneous once a week    Lipoma of skin and subcutaneous tissue  Present on the right side-chest wall  Reassured continue to monitor.    Combined arterial insufficiency and corporo-venous occlusive erectile dysfunction  Doing well on Cialis 5 mg, requested refill.  - tadalafil (CIALIS) 5 MG tablet; Take 1 tablet (5 mg) by mouth every 24 hours          BMI  Estimated body mass index is 46.52 kg/m  as calculated from the following:    Height as of this encounter: 1.702 m (5' 7\").    Weight as of this encounter: 134.7 kg (297 lb).   Weight management plan: Discussed healthy diet and exercise guidelines      Work on weight loss  Regular exercise    Licha Chase is a 54 year old, presenting for the following health issues:  Mass        7/8/2024     1:11 PM   Additional Questions   Roomed by Jossie ROTH   Accompanied by Self     History of Present Illness       Reason for visit:  Bump on rib and general questions  Symptom onset:  3-4 weeks ago    He eats 0-1 servings of fruits and " "vegetables daily.He consumes 0 sweetened beverage(s) daily.He exercises with enough effort to increase his heart rate 9 or less minutes per day.  He exercises with enough effort to increase his heart rate 3 or less days per week.   He is taking medications regularly.       Lump on the right side noticed       Review of Systems  Constitutional, HEENT, cardiovascular, pulmonary, gi and gu systems are negative, except as otherwise noted.      Objective    /71   Pulse 60   Temp 97.1  F (36.2  C) (Tympanic)   Resp 18   Ht 1.702 m (5' 7\")   Wt 134.7 kg (297 lb)   SpO2 94%   BMI 46.52 kg/m    Body mass index is 46.52 kg/m .  Physical Exam  Vitals and nursing note reviewed.   Constitutional:       General: He is not in acute distress.     Appearance: Normal appearance. He is obese. He is not ill-appearing, toxic-appearing or diaphoretic.   HENT:      Head: Normocephalic and atraumatic.   Chest:   Breasts:     Breasts are symmetrical.      Right: Normal. No swelling or bleeding.      Left: Normal. No swelling, bleeding or inverted nipple.                        Signed Electronically by: Carolyn Rich MD    "

## 2024-09-23 ENCOUNTER — OFFICE VISIT (OUTPATIENT)
Dept: FAMILY MEDICINE | Facility: CLINIC | Age: 54
End: 2024-09-23
Payer: COMMERCIAL

## 2024-09-23 VITALS
HEART RATE: 72 BPM | OXYGEN SATURATION: 97 % | SYSTOLIC BLOOD PRESSURE: 120 MMHG | BODY MASS INDEX: 45.2 KG/M2 | TEMPERATURE: 97.6 F | DIASTOLIC BLOOD PRESSURE: 82 MMHG | HEIGHT: 67 IN | RESPIRATION RATE: 16 BRPM | WEIGHT: 288 LBS

## 2024-09-23 DIAGNOSIS — I70.209 ATHEROSCLEROTIC PERIPHERAL VASCULAR DISEASE (H): ICD-10-CM

## 2024-09-23 DIAGNOSIS — E66.813 CLASS 3 SEVERE OBESITY WITH BODY MASS INDEX (BMI) OF 45.0 TO 49.9 IN ADULT, UNSPECIFIED OBESITY TYPE, UNSPECIFIED WHETHER SERIOUS COMORBIDITY PRESENT (H): ICD-10-CM

## 2024-09-23 DIAGNOSIS — E66.01 CLASS 3 SEVERE OBESITY WITH BODY MASS INDEX (BMI) OF 45.0 TO 49.9 IN ADULT, UNSPECIFIED OBESITY TYPE, UNSPECIFIED WHETHER SERIOUS COMORBIDITY PRESENT (H): ICD-10-CM

## 2024-09-23 PROCEDURE — 90471 IMMUNIZATION ADMIN: CPT | Performed by: FAMILY MEDICINE

## 2024-09-23 PROCEDURE — 99213 OFFICE O/P EST LOW 20 MIN: CPT | Mod: 25 | Performed by: FAMILY MEDICINE

## 2024-09-23 PROCEDURE — 90746 HEPB VACCINE 3 DOSE ADULT IM: CPT | Performed by: FAMILY MEDICINE

## 2024-09-23 ASSESSMENT — PAIN SCALES - GENERAL: PAINLEVEL: NO PAIN (0)

## 2024-09-23 NOTE — PROGRESS NOTES
"  Assessment & Plan     Class 3 severe obesity with body mass index (BMI) of 45.0 to 49.9 in adult, unspecified obesity type, unspecified whether serious comorbidity present (H)  Salvatore Kim is a 54 year old male who presents today for follow-up on weight loss.  He was started on Wegovy last visit due to inability to lose weight on his own.  He lost 9 pounds since last visit.  Denies any side effects from medication.  He would like to continue the same dose 1 mg for now, continue increase the dose to 1.5 then 2.4.  If he plateaus.  Patient verbalized understanding and agreed on the plan of care.  All questions answered.  - Semaglutide-Weight Management (WEGOVY) 1 MG/0.5ML pen; Inject 1 mg subcutaneously once a week.    Atherosclerotic peripheral vascular disease (H24)  No current symptoms.  Recommended aggressive weight loss.  Continue to monitor              Subjective   Salvatore is a 54 year old, presenting for the following health issues:  Weight Loss (Wegovy RX )        9/23/2024     3:58 PM   Additional Questions   Roomed by Bebe ROTH   Accompanied by Self     History of Present Illness       Reason for visit:  Wegovy follow up    He eats 0-1 servings of fruits and vegetables daily.He consumes 0 sweetened beverage(s) daily.He exercises with enough effort to increase his heart rate 9 or less minutes per day.  He exercises with enough effort to increase his heart rate 3 or less days per week.   He is taking medications regularly.       Wt Readings from Last 4 Encounters:   09/23/24 130.6 kg (288 lb)   07/08/24 134.7 kg (297 lb)   06/05/24 132.5 kg (292 lb)   03/01/24 124.8 kg (275 lb 3.2 oz)           Review of Systems  Constitutional, HEENT, cardiovascular, pulmonary, gi and gu systems are negative, except as otherwise noted.      Objective    /82   Pulse 72   Temp 97.6  F (36.4  C) (Tympanic)   Resp 16   Ht 1.702 m (5' 7\")   Wt 130.6 kg (288 lb)   SpO2 97%   BMI 45.11 kg/m    Body mass index is " 45.11 kg/m .  Physical Exam  Vitals and nursing note reviewed.   Constitutional:       General: He is not in acute distress.     Appearance: Normal appearance. He is obese. He is not ill-appearing, toxic-appearing or diaphoretic.   HENT:      Head: Normocephalic and atraumatic.   Neurological:      Mental Status: He is alert.                    Signed Electronically by: Carolyn Rich MD

## 2024-11-15 NOTE — PROGRESS NOTES
ASSESSMENT & PLAN    Salvatore was seen today for pain, follow up, pain and follow up.    Diagnoses and all orders for this visit:    Trigger finger of right thumb  -     Hand / Upper Extremity Injection/Arthrocentesis: R thumb A1    Trigger little finger of right hand  -     Hand / Upper Extremity Injection/Arthrocentesis: R small A1      We discussed injections, also seeing surgeon as scheduled and deferring injections for now, or new referral to ortho surgery trying to see someone else sooner.  Settled on right thumb and small finger A1 pulley steroid injections. No previous right small finger injection, that was new issue addressed today.  See below.  Questions answered. Discussed signs and symptoms that may indicate more serious issues; the patient was instructed to seek appropriate care if noted. Salvatore indicates understanding of these issues and agrees with the plan.      See Patient Instructions  Patient Instructions   Reviewed again options for triggering digits, including splinting, medication, possibly therapy, steroid injection, and discussing further with ortho surgery.  Following discussion, given degree of symptoms right thumb and small fingers, steroid injections done today for those digits. This is the second for the thumb, and the first for the small finger.  May do splinting for comfort/support if beneficial.  Discussed planning to keep upcoming visit with hand surgeon for now, but if desiring to discuss with ortho surgery further elsewhere or in the meantime, can provide some other options for surgeons you could see. We also discussed today that based on medical literature, safer to wait up to 3 months from most recent steroid injection prior to considering surgical intervention.  Otherwise, follow-up is open ended. Contact clinic if any other questions/concerns.    If you have any further questions for your physician or physician s care team you can contact them thru MyChart or by calling  [phone].        Injection Discharge Instructions    You may shower, however avoid swimming, tub baths or hot tubs for 24 hours following your procedure  You may have a mild to moderate increase in pain for several days following the injection.  It may take up to 14 days for the steroid medication to start working although you may feel the effect as early as a few days after the procedure.  You may use ice packs for 10-15 minutes, 3 to 4 times a day at the injection site for comfort  You may use anti-inflammatory medications (such as Ibuprofen or Aleve or Advil) if you are able to take safely, or acetaminophen (Tylenol) for pain control if necessary  If you were fasting, you may resume your normal diet and medications after the procedure  If you have diabetes, check your blood sugar more frequently than usual as your blood sugar may be higher than normal for 10-14 days following a steroid injection. Contact your doctor who manages your diabetes if your blood sugar is higher than usual    If you experience any of the following, call the clinic @ [phone]  - Fever over 100 degrees F  - Swelling, bleeding, redness, drainage, warmth at the injection site  - New or worsening pain      [facility] [place]    SUBJECTIVE- Interim History November 15, 2024    Chief Complaint   Patient presents with    Right Hand - Pain, Follow Up    Left Hand - Pain, Follow Up       Salvatore Kim is a 54 year old male who is seen in f/u up for Data Unavailable. Since last visit on 6/5/24 patient has felt an increase in pain specifically with the right thumb and the right pinky.    The patient is seen by themselves.  The patient is Right handed  Orthopedic/Surgical history: YES - history of multiple trigger fingers, (right long injection x2, last in 2022), left long finger (last injected 2021), status post right index finger release in 2020 by Dr Ruel Caldera  History/Occupation: work as       Past trigger finger injections:  Right thumb 6/5/24  Right ring 6/5/24  Left long 6/5/24    Right long 5/20/22    Left long 12/10/21    Right long 2/17/21    Right index 1/9/20      Past surgery:  Right index trigger finger release August 2020      REVIEW OF SYSTEMS:  Review of Systems    OBJECTIVE:     Triggering multiple digits, including right thumb, also right small finger      RADIOLOGY:  None new today. See previous notes.        Hand / Upper Extremity Injection/Arthrocentesis: R thumb A1    Date/Time: 11/16/2024 9:42 AM    Performed by: Behzad Corona DO  Authorized by: Behzad Corona DO    Indications:  Pain  Needle Size:  25 G  Guidance: landmark    Approach:  Volar  Condition: trigger finger    Location:  Thumb    Site:  R thumb A1  Medications:  10 mg triamcinolone 40 MG/ML; 0.25 mL lidocaine 1 %  Outcome:  Tolerated well, no immediate complications  Procedure discussed: discussed risks, benefits, and alternatives    Consent Given by:  Patient  Timeout: timeout called immediately prior to procedure    Prep: patient was prepped and draped in usual sterile fashion    Hand / Upper Extremity Injection/Arthrocentesis: R small A1    Date/Time: 11/16/2024 9:43 AM    Performed by: Behzad Corona DO  Authorized by: Behzad Corona DO    Indications:  Pain  Needle Size:  25 G  Guidance: landmark    Approach:  Volar  Condition: trigger finger    Location:  Small finger    Site:  R small A1  Medications:  10 mg triamcinolone 40 MG/ML; 0.25 mL lidocaine 1 %  Outcome:  Tolerated well, no immediate complications  Procedure discussed: discussed risks, benefits, and alternatives    Consent Given by:  Patient  Timeout: timeout called immediately prior to procedure    Prep: patient was prepped and draped in usual sterile fashion            Over 20 minutes spent by me on the date of the encounter doing chart review, history and exam, documentation  and further activities per the note, not including injections

## 2024-11-16 ENCOUNTER — OFFICE VISIT (OUTPATIENT)
Dept: ORTHOPEDICS | Facility: CLINIC | Age: 54
End: 2024-11-16
Payer: COMMERCIAL

## 2024-11-16 DIAGNOSIS — M65.351 TRIGGER LITTLE FINGER OF RIGHT HAND: ICD-10-CM

## 2024-11-16 DIAGNOSIS — M65.311 TRIGGER FINGER OF RIGHT THUMB: Primary | ICD-10-CM

## 2024-11-16 PROCEDURE — 20550 NJX 1 TENDON SHEATH/LIGAMENT: CPT | Mod: F5 | Performed by: PEDIATRICS

## 2024-11-16 RX ADMIN — LIDOCAINE HYDROCHLORIDE 0.25 ML: 10 INJECTION, SOLUTION INFILTRATION; PERINEURAL at 09:42

## 2024-11-16 RX ADMIN — TRIAMCINOLONE ACETONIDE 10 MG: 40 INJECTION, SUSPENSION INTRA-ARTICULAR; INTRAMUSCULAR at 09:43

## 2024-11-16 RX ADMIN — LIDOCAINE HYDROCHLORIDE 0.25 ML: 10 INJECTION, SOLUTION INFILTRATION; PERINEURAL at 09:43

## 2024-11-16 RX ADMIN — TRIAMCINOLONE ACETONIDE 10 MG: 40 INJECTION, SUSPENSION INTRA-ARTICULAR; INTRAMUSCULAR at 09:42

## 2024-11-16 NOTE — LETTER
11/16/2024      Salvatore Kim  [address] [facility]  [place] [address]      Dear Colleague,    Thank you for referring your patient, Salvatore Kim, to the [facility] [place]. Please see a copy of my visit note below.    ASSESSMENT & PLAN    Salvatore was seen today for pain, follow up, pain and follow up.    Diagnoses and all orders for this visit:    Trigger finger of right thumb  -     Hand / Upper Extremity Injection/Arthrocentesis: R thumb A1    Trigger little finger of right hand  -     Hand / Upper Extremity Injection/Arthrocentesis: R small A1      We discussed injections, also seeing surgeon as scheduled and deferring injections for now, or new referral to ortho surgery trying to see someone else sooner.  Settled on right thumb and small finger A1 pulley steroid injections. No previous right small finger injection, that was new issue addressed today.  See below.  Questions answered. Discussed signs and symptoms that may indicate more serious issues; the patient was instructed to seek appropriate care if noted. Salvatore indicates understanding of these issues and agrees with the plan.      See Patient Instructions  Patient Instructions   Reviewed again options for triggering digits, including splinting, medication, possibly therapy, steroid injection, and discussing further with ortho surgery.  Following discussion, given degree of symptoms right thumb and small fingers, steroid injections done today for those digits. This is the second for the thumb, and the first for the small finger.  May do splinting for comfort/support if beneficial.  Discussed planning to keep upcoming visit with hand surgeon for now, but if desiring to discuss with ortho surgery further elsewhere or in the meantime, can provide some other options for surgeons you could see. We also discussed today that based on medical literature, safer to wait up to 3 months from most recent steroid injection prior to considering  surgical intervention.  Otherwise, follow-up is open ended. Contact clinic if any other questions/concerns.    If you have any further questions for your physician or physician s care team you can contact them thru MyChart or by calling [phone].        Injection Discharge Instructions    You may shower, however avoid swimming, tub baths or hot tubs for 24 hours following your procedure  You may have a mild to moderate increase in pain for several days following the injection.  It may take up to 14 days for the steroid medication to start working although you may feel the effect as early as a few days after the procedure.  You may use ice packs for 10-15 minutes, 3 to 4 times a day at the injection site for comfort  You may use anti-inflammatory medications (such as Ibuprofen or Aleve or Advil) if you are able to take safely, or acetaminophen (Tylenol) for pain control if necessary  If you were fasting, you may resume your normal diet and medications after the procedure  If you have diabetes, check your blood sugar more frequently than usual as your blood sugar may be higher than normal for 10-14 days following a steroid injection. Contact your doctor who manages your diabetes if your blood sugar is higher than usual    If you experience any of the following, call the clinic @ [phone]  - Fever over 100 degrees F  - Swelling, bleeding, redness, drainage, warmth at the injection site  - New or worsening pain      [facility] [place]    SUBJECTIVE- Interim History November 15, 2024    Chief Complaint   Patient presents with     Right Hand - Pain, Follow Up     Left Hand - Pain, Follow Up       Salvatore Kim is a 54 year old male who is seen in f/u up for Data Unavailable. Since last visit on 6/5/24 patient has felt an increase in pain specifically with the right thumb and the right pinky.    The patient is seen by themselves.  The patient is Right  handed  Orthopedic/Surgical history: YES - history of multiple trigger fingers, (right long injection x2, last in 2022), left long finger (last injected 2021), status post right index finger release in 2020 by Dr Lipscomb  Social History/Occupation: work as       Past trigger finger injections:  Right thumb 6/5/24  Right ring 6/5/24  Left long 6/5/24    Right long 5/20/22    Left long 12/10/21    Right long 2/17/21    Right index 1/9/20      Past surgery:  Right index trigger finger release August 2020      REVIEW OF SYSTEMS:  Review of Systems    OBJECTIVE:     Triggering multiple digits, including right thumb, also right small finger      RADIOLOGY:  None new today. See previous notes.        Hand / Upper Extremity Injection/Arthrocentesis: R thumb A1    Date/Time: 11/16/2024 9:42 AM    Performed by: Behzad Corona DO  Authorized by: Behzad Corona DO    Indications:  Pain  Needle Size:  25 G  Guidance: landmark    Approach:  Volar  Condition: trigger finger    Location:  Thumb    Site:  R thumb A1  Medications:  10 mg triamcinolone 40 MG/ML; 0.25 mL lidocaine 1 %  Outcome:  Tolerated well, no immediate complications  Procedure discussed: discussed risks, benefits, and alternatives    Consent Given by:  Patient  Timeout: timeout called immediately prior to procedure    Prep: patient was prepped and draped in usual sterile fashion    Hand / Upper Extremity Injection/Arthrocentesis: R small A1    Date/Time: 11/16/2024 9:43 AM    Performed by: Behzad Corona DO  Authorized by: Behzad Corona DO    Indications:  Pain  Needle Size:  25 G  Guidance: landmark    Approach:  Volar  Condition: trigger finger    Location:  Small finger    Site:  R small A1  Medications:  10 mg triamcinolone 40 MG/ML; 0.25 mL lidocaine 1 %  Outcome:  Tolerated well, no immediate complications  Procedure discussed: discussed risks, benefits, and alternatives    Consent Given by:  Patient  Timeout:  timeout called immediately prior to procedure    Prep: patient was prepped and draped in usual sterile fashion            Over 20 minutes spent by me on the date of the encounter doing chart review, history and exam, documentation and further activities per the note, not including injections           Again, thank you for allowing me to participate in the care of your patient.        Sincerely,        Behzad Corona, DO

## 2024-11-16 NOTE — PATIENT INSTRUCTIONS
Reviewed again options for triggering digits, including splinting, medication, possibly therapy, steroid injection, and discussing further with ortho surgery.  Following discussion, given degree of symptoms right thumb and small fingers, steroid injections done today for those digits. This is the second for the thumb, and the first for the small finger.  May do splinting for comfort/support if beneficial.  Discussed planning to keep upcoming visit with hand surgeon for now, but if desiring to discuss with ortho surgery further elsewhere or in the meantime, can provide some other options for surgeons you could see. We also discussed today that based on medical literature, safer to wait up to 3 months from most recent steroid injection prior to considering surgical intervention.  Otherwise, follow-up is open ended. Contact clinic if any other questions/concerns.    If you have any further questions for your physician or physician s care team you can contact them thru Vidcaster[employer] or by calling [phone].        Injection Discharge Instructions    You may shower, however avoid swimming, tub baths or hot tubs for 24 hours following your procedure  You may have a mild to moderate increase in pain for several days following the injection.  It may take up to 14 days for the steroid medication to start working although you may feel the effect as early as a few days after the procedure.  You may use ice packs for 10-15 minutes, 3 to 4 times a day at the injection site for comfort  You may use anti-inflammatory medications (such as Ibuprofen or Aleve or Advil) if you are able to take safely, or acetaminophen (Tylenol) for pain control if necessary  If you were fasting, you may resume your normal diet and medications after the procedure  If you have diabetes, check your blood sugar more frequently than usual as your blood sugar may be higher than normal for 10-14 days following a steroid injection. Contact your doctor who  manages your diabetes if your blood sugar is higher than usual    If you experience any of the following, call the clinic @ [phone]  - Fever over 100 degrees F  - Swelling, bleeding, redness, drainage, warmth at the injection site  - New or worsening pain

## 2024-11-18 RX ORDER — TRIAMCINOLONE ACETONIDE 40 MG/ML
10 INJECTION, SUSPENSION INTRA-ARTICULAR; INTRAMUSCULAR
Status: COMPLETED | OUTPATIENT
Start: 2024-11-16 | End: 2024-11-16

## 2024-11-18 RX ORDER — LIDOCAINE HYDROCHLORIDE 10 MG/ML
0.25 INJECTION, SOLUTION INFILTRATION; PERINEURAL
Status: COMPLETED | OUTPATIENT
Start: 2024-11-16 | End: 2024-11-16

## 2024-12-27 ENCOUNTER — TELEPHONE (OUTPATIENT)
Dept: ORTHOPEDICS | Facility: CLINIC | Age: 54
End: 2024-12-27

## 2024-12-27 ENCOUNTER — OFFICE VISIT (OUTPATIENT)
Dept: ORTHOPEDICS | Facility: CLINIC | Age: 54
End: 2024-12-27
Attending: PEDIATRICS
Payer: COMMERCIAL

## 2024-12-27 DIAGNOSIS — M65.331 TRIGGER MIDDLE FINGER OF RIGHT HAND: ICD-10-CM

## 2024-12-27 DIAGNOSIS — M65.341 TRIGGER RING FINGER OF RIGHT HAND: ICD-10-CM

## 2024-12-27 DIAGNOSIS — M65.311 TRIGGER FINGER OF RIGHT THUMB: ICD-10-CM

## 2024-12-27 DIAGNOSIS — M65.332 TRIGGER MIDDLE FINGER OF LEFT HAND: ICD-10-CM

## 2024-12-27 PROCEDURE — 99204 OFFICE O/P NEW MOD 45 MIN: CPT | Performed by: STUDENT IN AN ORGANIZED HEALTH CARE EDUCATION/TRAINING PROGRAM

## 2024-12-27 NOTE — LETTER
12/27/2024      Salvatore Kim  [address] [place] [address]  [place] [address]      Dear Colleague,    Thank you for referring your patient, Salvatore Kim, to the [facility]. Please see a copy of my visit note below.    Ortho Hand    HPI: 54 year-old RHD NS p/w R thumb, MF/RF/SF triggering. The R thumb triggering is the worst. He has had 2 injections with recurrence of triggering. The last steroid injections were to the R thumb and SF a month ago. He has more intermittent triggering of the R MF/RF/SF. He would like to consider surgery.     ROS: Negative, see HPI  PMH: Nondiabetic  PSH: BL CTR years ago, R IF A1 pulley release 4-5 years ago  Medications: No blood thinners  Allergies: NSAIDs  SH: Nonsmoker, denies any tobacco or nicotine use  FH: No bleeding or clotting issues, or problems with anesthesia    Examination:  Nonlabored breathing  Not distressed  R thumb A1 pulley tenderness with grade II triggering  R MF/RF/SF A1 pulley tenderness with no active triggering but grade 1 with uneven tendon excursion  Well-healed R IF base/palm scar     A/P: 54 year-old RHD NS p/w R thumb, MF/RF/SF triggering refractory to conservative management    -Discussed options for management. Since the patient has had triggering recur despite steroid injection(s), it would not be unreasonable to proceed with surgery. Patient agrees. We can plan for R thumb A1 pulley releases, possible middle, ring and small finger trigger releases.  -Discussed the risks of surgery, including but not limited to: infection, bleeding, pain, poor scarring, wound healing issues, injury to nerves and/or tendons, neuroma formation, recurrence of the condition including pain, stiffness, need for revision or additional surgery, complex regional pain syndrome, anesthesia-related complication. Despite these risks, patient consents to RIGHT thumb A1 pulley release, possible RIGHT middle, ring and small finger A1 pulley releases.  -Case request  placed  -MAC with local  -To be scheduled >2 weeks or later from this visit or >6 weeks since the last steroid injection  -A total of 30 minutes was devoted to review of chart, direct face-to-face patient counseling and documentation during and on the day of this encounter, exclusive of any procedure performed.    Goyo Bonilla MD, PhD, FACS      Again, thank you for allowing me to participate in the care of your patient.        Sincerely,        Goyo Bonilla MD    Electronically signed

## 2024-12-27 NOTE — NURSING NOTE
"Pre-Operative Teaching Flowsheet     Person(s) involved in teaching: Patient     Motivation Level:  Receptive (willing/able to accept information) and asks appropriate questions where applicable: Yes  Any cultural factors/Nondenominational beliefs that may influence understanding or compliance? No     Patient demonstrates understanding of the following:  Pre-operative planning, including the necessary appointments and preparation needed prior to surgery: Yes  Which situations necessitate calling provider and whom to contact: Yes  Pain management techniques pre and post op: Yes  Stoplight tool introduced, questions answered, patient expressed understanding: Yes  How, and when, to access community resources: Yes  Discussed appropriate and safe discharge to home: Yes  Patient has a designated  for surgery and \"\" to stay with them after: Yes    Additional Teaching Concerns Addressed:  Post-operative living arrangements and necessary adaptations to living environment.  Instructional Materials Used/Given: Yes, pre-op packet given to patient with additional system forms added as needed depending on type of surgery. Pre-op soap given (if in clinic).     Time spent with patient: 20 minutes.    Handy Crandall RNCC  "

## 2024-12-27 NOTE — TELEPHONE ENCOUNTER
Pt likely wants to wait a few months. OK to wait as long as within 1 year of LOV.    Procedure: Right thumb, middle, ring, and small finger trigger releases  Facility:  or [facility]  Length: ? minutes  Anesthesia: ?  Post-op appointments needed: 2 weeks with surgeon or PA, 6 weeks with surgeon only.  Surgery packet/instructions given to patient?  Yes     Handy Crandall, RNCC

## 2024-12-29 NOTE — PROGRESS NOTES
Ortho Hand    HPI: 54 year-old RHD NS p/w R thumb, MF/RF/SF triggering. The R thumb triggering is the worst. He has had 2 injections with recurrence of triggering. The last steroid injections were to the R thumb and SF a month ago. He has more intermittent triggering of the R MF/RF/SF. He would like to consider surgery.     ROS: Negative, see HPI  PMH: Nondiabetic  PSH: BL CTR years ago, R IF A1 pulley release 4-5 years ago  Medications: No blood thinners  Allergies: NSAIDs  SH: Nonsmoker, denies any tobacco or nicotine use  FH: No bleeding or clotting issues, or problems with anesthesia    Examination:  Nonlabored breathing  Not distressed  R thumb A1 pulley tenderness with grade II triggering  R MF/RF/SF A1 pulley tenderness with no active triggering but grade 1 with uneven tendon excursion  Well-healed R IF base/palm scar     A/P: 54 year-old RHD NS p/w R thumb, MF/RF/SF triggering refractory to conservative management    -Discussed options for management. Since the patient has had triggering recur despite steroid injection(s), it would not be unreasonable to proceed with surgery. Patient agrees. We can plan for R thumb A1 pulley releases, possible middle, ring and small finger trigger releases.  -Discussed the risks of surgery, including but not limited to: infection, bleeding, pain, poor scarring, wound healing issues, injury to nerves and/or tendons, neuroma formation, recurrence of the condition including pain, stiffness, need for revision or additional surgery, complex regional pain syndrome, anesthesia-related complication. Despite these risks, patient consents to RIGHT thumb A1 pulley release, possible RIGHT middle, ring and small finger A1 pulley releases.  -Case request placed  -MAC with local  -To be scheduled >2 weeks or later from this visit or >6 weeks since the last steroid injection  -A total of 30 minutes was devoted to review of chart, direct face-to-face patient counseling and documentation  during and on the day of this encounter, exclusive of any procedure performed.    Goyo Bonilla MD, PhD, FACS

## 2024-12-30 ENCOUNTER — HOSPITAL ENCOUNTER (OUTPATIENT)
Facility: CLINIC | Age: 54
End: 2024-12-30
Attending: STUDENT IN AN ORGANIZED HEALTH CARE EDUCATION/TRAINING PROGRAM | Admitting: STUDENT IN AN ORGANIZED HEALTH CARE EDUCATION/TRAINING PROGRAM
Payer: COMMERCIAL

## 2024-12-30 PROBLEM — M65.311 TRIGGER FINGER OF RIGHT THUMB: Status: ACTIVE | Noted: 2024-12-27

## 2024-12-30 NOTE — TELEPHONE ENCOUNTER
Received call from [facility] OR schedulers, they informed writer that patient's BMI is 45.11. BMI needs to be 45 and under. [facility] OR schedulers would like to know if patient is aware that due to their BMI, they cannot have surgery at [facility] unless they lose a few pounds.     Please advise?    Thanks.    Kathrin Carr on 12/30/2024 at 12:00 PM

## 2024-12-30 NOTE — TELEPHONE ENCOUNTER
Called patient and let him know that the surgery location depends on his current BMI. Asked if he had an updated weight and he is at work and is not sure. He would like a call back at 3:30 to discuss.  Monica Forbes RN

## 2024-12-30 NOTE — TELEPHONE ENCOUNTER
Called to schedule surgery with: Dr. Bonilla    Spoke with: Salvatore     Date of Surgery: 6/11    Estimated Arrival time Discussed with Patient:  No    Location of surgery: [facility]    Pre-operative H&P: patient confirmed they will schedule with primary care clinic, Nghia Momin PA-C    Post-operative Appointment w/[facility] or Surgeon: Dr. Bonilla 6/27 at 1:20 PM    Additional Appointments: N/A, no additional visits requested    Discussed with patient pre-op RN will call 2-3 days prior to surgery with arrival time and instructions:  Yes     Standard Ortho Surgery Packet: Yes - was provided to patient during appointment    All patients questions were answered and was instructed to contact the clinic with any questions or concerns.     Additional Comments: N/A and Patient requested to be placed on a cancellation list      Kathrin Carr on 12/30/2024 at 11:35 AM

## 2024-12-30 NOTE — TELEPHONE ENCOUNTER
Called to schedule surgery with: Dr. Bonilla    Spoke with: Salvatore     Date of Surgery: 4/16    Estimated Arrival time Discussed with Patient:  No    [facility]    Pre-operative H&P: patient confirmed they will schedule with primary care clinic    Post-operative Appointment w/[facility] or Surgeon: Briseida Wright PA-C 4/30 at 2:00 PM    Additional Appointments: N/A, no additional visits requested    Discussed with patient pre-op RN will call 2-3 days prior to surgery with arrival time and instructions:  Yes     Standard Ortho Surgery Packet: Yes - to be sent via US mail    All patients questions were answered and was instructed to contact the clinic with any questions or concerns.     Additional Comments: N/A      Kathrin Carr on 12/30/2024 at 4:23 PM

## 2025-01-07 ENCOUNTER — OFFICE VISIT (OUTPATIENT)
Dept: FAMILY MEDICINE | Facility: CLINIC | Age: 55
End: 2025-01-07
Payer: COMMERCIAL

## 2025-01-07 VITALS
OXYGEN SATURATION: 100 % | BODY MASS INDEX: 46.2 KG/M2 | HEART RATE: 72 BPM | TEMPERATURE: 98.2 F | WEIGHT: 295 LBS | RESPIRATION RATE: 22 BRPM | SYSTOLIC BLOOD PRESSURE: 129 MMHG | DIASTOLIC BLOOD PRESSURE: 79 MMHG

## 2025-01-07 DIAGNOSIS — E66.01 CLASS 3 SEVERE OBESITY WITH SERIOUS COMORBIDITY AND BODY MASS INDEX (BMI) OF 45.0 TO 49.9 IN ADULT, UNSPECIFIED OBESITY TYPE (H): Primary | ICD-10-CM

## 2025-01-07 DIAGNOSIS — E66.813 CLASS 3 SEVERE OBESITY WITH SERIOUS COMORBIDITY AND BODY MASS INDEX (BMI) OF 45.0 TO 49.9 IN ADULT, UNSPECIFIED OBESITY TYPE (H): Primary | ICD-10-CM

## 2025-01-07 PROCEDURE — 99213 OFFICE O/P EST LOW 20 MIN: CPT | Performed by: FAMILY MEDICINE

## 2025-01-07 ASSESSMENT — PAIN SCALES - GENERAL: PAINLEVEL_OUTOF10: NO PAIN (0)

## 2025-01-07 NOTE — PATIENT INSTRUCTIONS
Options for GLP1/GIP agonist without insurance:     Pay out of pocket for Wegovy or Zepbound pens (>$1000/month cash price without savings card)   Zepbound cost with Zepbound savings card (link below to sign up for this): $650/month with card  https://www.enrollment.zepbound.[employer].com/enroll/checkEnrollment  Wegovy cost with Wegovy savings card (link below to sign up for this): $650/month with card   https://www.ipsy/coverage-and-savings/save-on-wegovy.html  Compounded semaglutide (same active ingredient as Wegovy) from [facility] ($230/month for doses of 1mg or less; $370/month for doses higher than 1mg)  This is an available option for as long as Wegovy is on FDA shortage list, Wegovy has been on the list for the last several months with no foreseeable end in sight as of now   Zepbound Vials through [facility] - vials only available in 2.5 mg and 5 mg doses  $399/month for 2.5mg vials  $549/month for 5mg vials   $5 per month for administrations supplies (syringe/needles, etc)

## 2025-01-07 NOTE — PROGRESS NOTES
"  Assessment & Plan     Class 3 severe obesity with serious comorbidity and body mass index (BMI) of 45.0 to 49.9 in adult, unspecified obesity type (H)  Salvatore Kim is a 54 year old male with history of gastric sleeve who presents today for follow-up on weight management.  He was started on compounded Wegovy with titration dose several months ago.  He is currently on Wegovy 1.5 mg weekly tolerating medication well without side effects.  He has not had desired weight loss.  Patient would benefit from continuing pharmacotherapy for weight management. Given his current diagnosis of obesity class III, I recommend continuing GLP1/GIP therapy as data to support most significant weight loss and patient has no contraindications.   Patient also seeing benefit from reduction in food noise and increased satiety.   I recommend increasing the dose to 2.4 mg weekly.  Patient also interested in learning about gastric bypass surgery, will refer him to bariatric surgery  Continue with lifestyle changes.    Wt Readings from Last 4 Encounters:   01/07/25 133.8 kg (295 lb)   09/23/24 130.6 kg (288 lb)   07/08/24 134.7 kg (297 lb)   06/05/24 132.5 kg (292 lb)     - Semaglutide-Weight Management (WEGOVY) 2.4 MG/0.75ML pen; Inject 2.4 mg subcutaneously once a week.  - Adult Comprehensive Weight Management  Referral; Future          BMI  Estimated body mass index is 46.2 kg/m  as calculated from the following:    Height as of 9/23/24: 1.702 m (5' 7\").    Weight as of this encounter: 133.8 kg (295 lb).   Weight management plan: Patient referred to endocrine and/or weight management specialty Discussed healthy diet and exercise guidelines      Work on weight loss  Regular exercise    Licha Chase is a 54 year old, presenting for the following health issues:  Weight Check      1/7/2025     5:09 PM   Additional Questions   Roomed by edith   Accompanied by self     History of Present Illness       Reason for visit:  Wegovy " check    He eats 0-1 servings of fruits and vegetables daily.He consumes 1 sweetened beverage(s) daily.He exercises with enough effort to increase his heart rate 9 or less minutes per day.  He exercises with enough effort to increase his heart rate 3 or less days per week.   He is taking medications regularly.           Wt Readings from Last 4 Encounters:   01/07/25 133.8 kg (295 lb)   09/23/24 130.6 kg (288 lb)   07/08/24 134.7 kg (297 lb)   06/05/24 132.5 kg (292 lb)               Review of Systems  Constitutional, HEENT, cardiovascular, pulmonary, gi and gu systems are negative, except as otherwise noted.      Objective    /79   Pulse 72   Temp 98.2  F (36.8  C) (Tympanic)   Resp 22   Wt 133.8 kg (295 lb)   SpO2 100%   BMI 46.20 kg/m    Body mass index is 46.2 kg/m .  Physical Exam  Vitals and nursing note reviewed.   Constitutional:       General: He is not in acute distress.     Appearance: Normal appearance. He is obese. He is not ill-appearing, toxic-appearing or diaphoretic.   HENT:      Head: Normocephalic and atraumatic.   Neurological:      Mental Status: He is alert.                    Signed Electronically by: Carolyn Rich MD

## 2025-04-08 ENCOUNTER — MYC MEDICAL ADVICE (OUTPATIENT)
Dept: PLASTIC SURGERY | Facility: CLINIC | Age: 55
End: 2025-04-08
Payer: COMMERCIAL

## 2025-04-08 VITALS — HEIGHT: 67 IN | WEIGHT: 295 LBS | BODY MASS INDEX: 46.3 KG/M2

## 2025-04-11 NOTE — TELEPHONE ENCOUNTER
Pt's BMI (49.6)is too high to be done safely at [facility] (limit of 48). Pt either needs to be changed to local only or will need to be reschedule to the hospital.     Called and left VM to discuss.    Handy Crandall, RNCC

## 2025-04-14 ENCOUNTER — ANESTHESIA EVENT (OUTPATIENT)
Dept: SURGERY | Facility: CLINIC | Age: 55
End: 2025-04-14
Payer: COMMERCIAL

## 2025-04-14 ASSESSMENT — LIFESTYLE VARIABLES: TOBACCO_USE: 1

## 2025-04-14 NOTE — TELEPHONE ENCOUNTER
Left voicemail for patient regarding rescheduling surgery with Dr. Bonilla to the [facility] per pre-op nurses after reviewing H&P     MyChart reply also sent regarding location change    Requested confirmation via call or [employer] message    Provided direct contact line to discuss   P: [phone]    Michell Balbuena on 4/14/2025 at 10:03 AM

## 2025-04-14 NOTE — TELEPHONE ENCOUNTER
Confirmed new location for surgery at [facility]    Approximate arrival: 1:30 PM    Patient aware pre-admission nurses will call with times    Patient does not want to attempt to proceed as local only in order to schedule at the [facility], due to the risk of needing to reschedule     Patient did ask about cost at hospital being more, offered Cost of Care phone number but he declined    No further questions    Michell Balbuena on 4/14/2025 at 12:20 PM

## 2025-04-15 NOTE — ANESTHESIA PREPROCEDURE EVALUATION
Anesthesia Pre-Procedure Evaluation    Patient: Salvatore Kim   MRN: [id number] : 1970        Procedure : Procedure(s):  RIGHT thumb A1 pulley release, possible RIGHT middle, ring and small finger A1 pulley releases          This is a 54 year old male with history of gastric sleeve resection but a BMI still of 46 (which excludes him from ASC), depression, and arthritis who is to get a trigger release under MAC.    He typically takes omeprazole for reflux since his gastric sleeve, but he says he was told to not take any meds since .  He  says he is not currently symptomatic, despite not being on his omeprazole.  He had a sip of water around noon.    Past Medical History:   Diagnosis Date    Allergies     Arthritis     Depression, anxiety     ED (erectile dysfunction)     Hyperlipidemia     Metabolic syndrome     Trigger finger, right       Past Surgical History:   Procedure Laterality Date    COLONOSCOPY  2013    Procedure: COLONOSCOPY;  COLONOSCOPY, EXAM UNDER ANESTHESIA,  FISTULOTOMY;  Surgeon: Maxwell Israel MD;  Location: [facility]    EXAM UNDER ANESTHESIA ANUS  2013    Procedure: EXAM UNDER ANESTHESIA ANUS;;  Surgeon: Maxwell Israel MD;  Location: [facility]    FISTULOTOMY RECTUM  2013    Procedure: FISTULOTOMY RECTUM;;  Surgeon: Maxwell Israel MD;  Location: [facility]    HEMORRHOID SURGERY      RELEASE CARPAL TUNNEL      Rt    ROTATOR CUFF REPAIR RT/LT Right 2017      Allergies   Allergen Reactions    Bee Venom Swelling    Chocolate Swelling    Nsaids Other (See Comments)     Avoid due to potential/increased risk for ulcer post sleeve gastrectomy    Avoid due to potential/increased risk for ulcer post sleeve gastrectomy.      Social History     Tobacco Use    Smoking status: Former     Current packs/day: 1.50     Average packs/day: 1.5 packs/day for 25.0 years (37.5 ttl pk-yrs)     Types: Cigarettes    Smokeless tobacco: Never    Tobacco comments:     quit 3 weeks ago.   Substance Use  "Topics    Alcohol use: Yes     Alcohol/week: 3.3 standard drinks of alcohol     Types: 4 Standard drinks or equivalent per week     Comment: RARE, socially       Wt Readings from Last 1 Encounters:   01/07/25 133.8 kg (295 lb)        Anesthesia Evaluation            ROS/MED HX  ENT/Pulmonary:     (+)     ROSEMARIE risk factors,   obese,        tobacco use, Past use,                       Neurologic:       Cardiovascular:     (+) Dyslipidemia - -   -  - -                                      METS/Exercise Tolerance:     Hematologic:       Musculoskeletal: Comment: Trigger finger      GI/Hepatic:     (+) GERD,                   Renal/Genitourinary:       Endo:     (+)               Obesity,       Psychiatric/Substance Use:       Infectious Disease:       Malignancy:       Other:            Physical Exam    Airway        Mallampati: III   TM distance: > 3 FB   Neck ROM: full   Mouth opening: > 3 cm    Respiratory Devices and Support         Dental       (+) Completely normal teeth      Cardiovascular   cardiovascular exam normal          Pulmonary           (+) decreased breath sounds           OUTSIDE LABS:  CBC:   Lab Results   Component Value Date    WBC 9.7 11/04/2022    WBC 6.1 12/18/2013    HGB 14.6 11/04/2022    HGB 13.9 08/05/2020    HCT 44.6 11/04/2022    HCT 45.9 12/18/2013     11/04/2022     12/18/2013     BMP:   Lab Results   Component Value Date     01/09/2016     12/18/2013    POTASSIUM 4.1 01/09/2016    POTASSIUM 3.9 12/18/2013    CHLORIDE 107 01/09/2016    CHLORIDE 104 12/18/2013    CO2 29 01/09/2016    CO2 23 12/18/2013    BUN 8 01/09/2016    BUN 9 12/18/2013    CR 0.84 01/09/2016    CR 0.77 12/18/2013     (H) 01/09/2016    GLC 93 12/18/2013     COAGS: No results found for: \"PTT\", \"INR\", \"FIBR\"  POC: No results found for: \"BGM\", \"HCG\", \"HCGS\"  HEPATIC:   Lab Results   Component Value Date    ALBUMIN 4.3 12/18/2013    PROTTOTAL 7.8 12/18/2013    ALT 39 05/07/2014    AST 28 " "12/18/2013    ALKPHOS 72 12/18/2013    BILITOTAL 1.1 12/18/2013     OTHER:   Lab Results   Component Value Date    A1C 5.5 01/21/2019    RADHA 8.4 (L) 01/09/2016    TSH 0.96 05/07/2014    SED 15 01/21/2021       Anesthesia Plan    ASA Status:  3    NPO Status:  NPO Appropriate    Anesthesia Type: MAC.     - Reason for MAC: straight local not clinically adequate   Induction: Intravenous, Propofol.   Maintenance: TIVA.   Techniques and Equipment:     - Lines/Monitors: BIS     Consents    Anesthesia Plan(s) and associated risks, benefits, and realistic alternatives discussed. Questions answered and patient/representative(s) expressed understanding.     - Discussed: Risks, Benefits and Alternatives for BOTH SEDATION and the PROCEDURE were discussed     - Discussed with:  Patient      - Extended Intubation/Ventilatory Support Discussed: No.      - Patient is DNR/DNI Status: No     Use of blood products discussed: No .     Postoperative Care    Pain management: IV analgesics, Oral pain medications, Multi-modal analgesia.   PONV prophylaxis: Ondansetron (or other 5HT-3), Dexamethasone or Solumedrol     Comments:               Vani Hollis MD    Clinically Significant Risk Factors Present on Admission                             # Severe Obesity: Estimated body mass index is 46.2 kg/m  as calculated from the following:    Height as of 4/8/25: 1.702 m (5' 7\").    Weight as of 4/8/25: 133.8 kg (295 lb).                "

## 2025-04-16 ENCOUNTER — ANESTHESIA (OUTPATIENT)
Dept: SURGERY | Facility: CLINIC | Age: 55
End: 2025-04-16
Payer: COMMERCIAL

## 2025-04-16 ENCOUNTER — HOSPITAL ENCOUNTER (OUTPATIENT)
Facility: CLINIC | Age: 55
Discharge: HOME | End: 2025-04-16
Attending: STUDENT IN AN ORGANIZED HEALTH CARE EDUCATION/TRAINING PROGRAM | Admitting: STUDENT IN AN ORGANIZED HEALTH CARE EDUCATION/TRAINING PROGRAM
Payer: COMMERCIAL

## 2025-04-16 VITALS
DIASTOLIC BLOOD PRESSURE: 67 MMHG | HEIGHT: 67 IN | BODY MASS INDEX: 47.16 KG/M2 | TEMPERATURE: 97.7 F | RESPIRATION RATE: 16 BRPM | SYSTOLIC BLOOD PRESSURE: 124 MMHG | WEIGHT: 300.49 LBS | HEART RATE: 56 BPM | OXYGEN SATURATION: 99 %

## 2025-04-16 DIAGNOSIS — M65.322 ACQUIRED TRIGGER FINGER OF LEFT INDEX FINGER: ICD-10-CM

## 2025-04-16 DIAGNOSIS — M65.311 TRIGGER FINGER OF RIGHT THUMB: Primary | ICD-10-CM

## 2025-04-16 PROCEDURE — 250N000013 HC RX MED GY IP 250 OP 250 PS 637: Performed by: ANESTHESIOLOGY

## 2025-04-16 PROCEDURE — 360N000075 HC SURGERY LEVEL 2, PER MIN: Performed by: STUDENT IN AN ORGANIZED HEALTH CARE EDUCATION/TRAINING PROGRAM

## 2025-04-16 PROCEDURE — 999N000141 HC STATISTIC PRE-PROCEDURE NURSING ASSESSMENT: Performed by: STUDENT IN AN ORGANIZED HEALTH CARE EDUCATION/TRAINING PROGRAM

## 2025-04-16 PROCEDURE — 710N000012 HC RECOVERY PHASE 2, PER MINUTE: Performed by: STUDENT IN AN ORGANIZED HEALTH CARE EDUCATION/TRAINING PROGRAM

## 2025-04-16 PROCEDURE — 250N000011 HC RX IP 250 OP 636: Performed by: STUDENT IN AN ORGANIZED HEALTH CARE EDUCATION/TRAINING PROGRAM

## 2025-04-16 PROCEDURE — 370N000017 HC ANESTHESIA TECHNICAL FEE, PER MIN: Performed by: STUDENT IN AN ORGANIZED HEALTH CARE EDUCATION/TRAINING PROGRAM

## 2025-04-16 PROCEDURE — 250N000009 HC RX 250: Performed by: STUDENT IN AN ORGANIZED HEALTH CARE EDUCATION/TRAINING PROGRAM

## 2025-04-16 PROCEDURE — 258N000003 HC RX IP 258 OP 636: Performed by: REGISTERED NURSE

## 2025-04-16 PROCEDURE — 250N000011 HC RX IP 250 OP 636: Performed by: REGISTERED NURSE

## 2025-04-16 PROCEDURE — 250N000011 HC RX IP 250 OP 636: Mod: JZ | Performed by: REGISTERED NURSE

## 2025-04-16 PROCEDURE — 272N000001 HC OR GENERAL SUPPLY STERILE: Performed by: STUDENT IN AN ORGANIZED HEALTH CARE EDUCATION/TRAINING PROGRAM

## 2025-04-16 PROCEDURE — 26055 INCISE FINGER TENDON SHEATH: CPT | Mod: F5 | Performed by: STUDENT IN AN ORGANIZED HEALTH CARE EDUCATION/TRAINING PROGRAM

## 2025-04-16 PROCEDURE — 250N000009 HC RX 250: Performed by: REGISTERED NURSE

## 2025-04-16 RX ORDER — NALOXONE HYDROCHLORIDE 0.4 MG/ML
0.1 INJECTION, SOLUTION INTRAMUSCULAR; INTRAVENOUS; SUBCUTANEOUS
Status: DISCONTINUED | OUTPATIENT
Start: 2025-04-16 | End: 2025-04-16 | Stop reason: HOSPADM

## 2025-04-16 RX ORDER — OXYCODONE HYDROCHLORIDE 5 MG/1
5 TABLET ORAL
Status: COMPLETED | OUTPATIENT
Start: 2025-04-16 | End: 2025-04-16

## 2025-04-16 RX ORDER — HYDROMORPHONE HCL IN WATER/PF 6 MG/30 ML
0.2 PATIENT CONTROLLED ANALGESIA SYRINGE INTRAVENOUS EVERY 5 MIN PRN
Status: DISCONTINUED | OUTPATIENT
Start: 2025-04-16 | End: 2025-04-16 | Stop reason: HOSPADM

## 2025-04-16 RX ORDER — LABETALOL HYDROCHLORIDE 5 MG/ML
10 INJECTION, SOLUTION INTRAVENOUS
Status: DISCONTINUED | OUTPATIENT
Start: 2025-04-16 | End: 2025-04-16 | Stop reason: HOSPADM

## 2025-04-16 RX ORDER — FENTANYL CITRATE 50 UG/ML
50 INJECTION, SOLUTION INTRAMUSCULAR; INTRAVENOUS EVERY 5 MIN PRN
Status: DISCONTINUED | OUTPATIENT
Start: 2025-04-16 | End: 2025-04-16 | Stop reason: HOSPADM

## 2025-04-16 RX ORDER — ONDANSETRON 4 MG/1
4 TABLET, ORALLY DISINTEGRATING ORAL EVERY 30 MIN PRN
Status: DISCONTINUED | OUTPATIENT
Start: 2025-04-16 | End: 2025-04-16 | Stop reason: HOSPADM

## 2025-04-16 RX ORDER — PROPOFOL 10 MG/ML
INJECTION, EMULSION INTRAVENOUS CONTINUOUS PRN
Status: DISCONTINUED | OUTPATIENT
Start: 2025-04-16 | End: 2025-04-16

## 2025-04-16 RX ORDER — OXYCODONE HYDROCHLORIDE 10 MG/1
10 TABLET ORAL
Status: DISCONTINUED | OUTPATIENT
Start: 2025-04-16 | End: 2025-04-16 | Stop reason: HOSPADM

## 2025-04-16 RX ORDER — SODIUM CHLORIDE, SODIUM LACTATE, POTASSIUM CHLORIDE, CALCIUM CHLORIDE 600; 310; 30; 20 MG/100ML; MG/100ML; MG/100ML; MG/100ML
INJECTION, SOLUTION INTRAVENOUS CONTINUOUS
Status: DISCONTINUED | OUTPATIENT
Start: 2025-04-16 | End: 2025-04-16 | Stop reason: HOSPADM

## 2025-04-16 RX ORDER — PROPOFOL 10 MG/ML
INJECTION, EMULSION INTRAVENOUS PRN
Status: DISCONTINUED | OUTPATIENT
Start: 2025-04-16 | End: 2025-04-16

## 2025-04-16 RX ORDER — SODIUM CHLORIDE, SODIUM LACTATE, POTASSIUM CHLORIDE, CALCIUM CHLORIDE 600; 310; 30; 20 MG/100ML; MG/100ML; MG/100ML; MG/100ML
INJECTION, SOLUTION INTRAVENOUS CONTINUOUS PRN
Status: DISCONTINUED | OUTPATIENT
Start: 2025-04-16 | End: 2025-04-16

## 2025-04-16 RX ORDER — FENTANYL CITRATE 50 UG/ML
25 INJECTION, SOLUTION INTRAMUSCULAR; INTRAVENOUS EVERY 5 MIN PRN
Status: DISCONTINUED | OUTPATIENT
Start: 2025-04-16 | End: 2025-04-16 | Stop reason: HOSPADM

## 2025-04-16 RX ORDER — LIDOCAINE HYDROCHLORIDE 20 MG/ML
INJECTION, SOLUTION INFILTRATION; PERINEURAL PRN
Status: DISCONTINUED | OUTPATIENT
Start: 2025-04-16 | End: 2025-04-16

## 2025-04-16 RX ORDER — ONDANSETRON 2 MG/ML
4 INJECTION INTRAMUSCULAR; INTRAVENOUS EVERY 30 MIN PRN
Status: DISCONTINUED | OUTPATIENT
Start: 2025-04-16 | End: 2025-04-16 | Stop reason: HOSPADM

## 2025-04-16 RX ORDER — LIDOCAINE 40 MG/G
CREAM TOPICAL
Status: DISCONTINUED | OUTPATIENT
Start: 2025-04-16 | End: 2025-04-16 | Stop reason: HOSPADM

## 2025-04-16 RX ORDER — TRAMADOL HYDROCHLORIDE 50 MG/1
50 TABLET ORAL EVERY 6 HOURS PRN
Qty: 10 TABLET | Refills: 0 | Status: SHIPPED | OUTPATIENT
Start: 2025-04-16 | End: 2025-04-19

## 2025-04-16 RX ORDER — CEPHALEXIN 500 MG/1
500 CAPSULE ORAL 4 TIMES DAILY
Qty: 4 CAPSULE | Refills: 0 | Status: SHIPPED | OUTPATIENT
Start: 2025-04-16

## 2025-04-16 RX ORDER — FENTANYL CITRATE 50 UG/ML
INJECTION, SOLUTION INTRAMUSCULAR; INTRAVENOUS PRN
Status: DISCONTINUED | OUTPATIENT
Start: 2025-04-16 | End: 2025-04-16

## 2025-04-16 RX ORDER — DEXAMETHASONE SODIUM PHOSPHATE 4 MG/ML
4 INJECTION, SOLUTION INTRA-ARTICULAR; INTRALESIONAL; INTRAMUSCULAR; INTRAVENOUS; SOFT TISSUE
Status: DISCONTINUED | OUTPATIENT
Start: 2025-04-16 | End: 2025-04-16 | Stop reason: HOSPADM

## 2025-04-16 RX ORDER — HYDROMORPHONE HCL IN WATER/PF 6 MG/30 ML
0.4 PATIENT CONTROLLED ANALGESIA SYRINGE INTRAVENOUS EVERY 5 MIN PRN
Status: DISCONTINUED | OUTPATIENT
Start: 2025-04-16 | End: 2025-04-16 | Stop reason: HOSPADM

## 2025-04-16 RX ORDER — ONDANSETRON 2 MG/ML
INJECTION INTRAMUSCULAR; INTRAVENOUS PRN
Status: DISCONTINUED | OUTPATIENT
Start: 2025-04-16 | End: 2025-04-16

## 2025-04-16 RX ADMIN — MIDAZOLAM 2 MG: 1 INJECTION INTRAMUSCULAR; INTRAVENOUS at 15:39

## 2025-04-16 RX ADMIN — PROPOFOL 50 MG: 10 INJECTION, EMULSION INTRAVENOUS at 15:48

## 2025-04-16 RX ADMIN — FENTANYL CITRATE 50 MCG: 50 INJECTION INTRAMUSCULAR; INTRAVENOUS at 16:07

## 2025-04-16 RX ADMIN — ONDANSETRON 4 MG: 2 INJECTION INTRAMUSCULAR; INTRAVENOUS at 15:46

## 2025-04-16 RX ADMIN — OXYCODONE HYDROCHLORIDE 5 MG: 5 TABLET ORAL at 17:13

## 2025-04-16 RX ADMIN — LIDOCAINE HYDROCHLORIDE 60 MG: 20 INJECTION, SOLUTION INFILTRATION; PERINEURAL at 15:46

## 2025-04-16 RX ADMIN — SODIUM CHLORIDE, SODIUM LACTATE, POTASSIUM CHLORIDE, AND CALCIUM CHLORIDE: .6; .31; .03; .02 INJECTION, SOLUTION INTRAVENOUS at 15:45

## 2025-04-16 RX ADMIN — PROPOFOL 150 MCG/KG/MIN: 10 INJECTION, EMULSION INTRAVENOUS at 15:48

## 2025-04-16 ASSESSMENT — ACTIVITIES OF DAILY LIVING (ADL)
ADLS_ACUITY_SCORE: 35

## 2025-04-16 NOTE — BRIEF OP NOTE
[facility]    Brief Operative Note    Pre-operative diagnosis: Trigger finger of right thumb [M65.311]   Right thumb triggering  Right middle finger triggering  Right ring finger triggering  Right small finger triggering  Post-operative diagnosis Same as pre-operative diagnosis    Procedure: RIGHT thumb, middle, ring and small finger A1 pulley releases, Right - Finger    Surgeon: Surgeons and Role:     * Goyo Bonilla MD - Primary     * Smita Wilks MD - Resident - Assisting  Anesthesia: MAC with Local   Estimated Blood Loss: 5 mL from 4/16/2025  3:42 PM to 4/16/2025  4:51 PM      Drains: None  Specimens: * No specimens in log *  Findings:   A1 pulley release of thumb, middle, ring and small fingers with tenolysis.   Complications: None.  Implants: * No implants in log *    Smita Wilks MD

## 2025-04-16 NOTE — ANESTHESIA CARE TRANSFER NOTE
Patient: Salvatore Kim    Procedure: Procedure(s):  RIGHT thumb, middle, ring and small finger A1 pulley releases       Diagnosis: Trigger finger of right thumb [M65.311]  Diagnosis Additional Information: No value filed.    Anesthesia Type:   MAC     Note:    Oropharynx: oropharynx clear of all foreign objects and spontaneously breathing  Level of Consciousness: awake  Oxygen Supplementation: room air    Independent Airway: airway patency satisfactory and stable  Dentition: dentition unchanged  Vital Signs Stable: post-procedure vital signs reviewed and stable  Report to RN Given: handoff report given  Patient transferred to: Phase II    Handoff Report: Identifed the Patient, Identified the Reponsible Provider, Reviewed the pertinent medical history, Discussed the surgical course, Reviewed Intra-OP anesthesia mangement and issues during anesthesia, Set expectations for post-procedure period and Allowed opportunity for questions and acknowledgement of understanding      Vitals:  Vitals Value Taken Time   /67 04/16/25 1655   Temp     Pulse 64 04/16/25 1653   Resp 13    SpO2 97 % 04/16/25 1658   Vitals shown include unfiled device data.    Electronically Signed By: RAUL Martins CRNA  April 16, 2025  4:59 PM

## 2025-04-16 NOTE — DISCHARGE INSTRUCTIONS
Hand Surgery Discharge Instructions    Patient has been treated for right hand trigger digits with Dr. Bonilla on 4/16/2025.     Care: Please keep the splint/cast/dressing clean and dry at all times. Should it get wet, please call the clinic to schedule an appointment - as it may need to be replaced. Do not hesitate to use the sling to help protect the affected extremity and in particular in the setting of a nerve block.     Medications: You can take Ibuprofen 400-800 mg and Tylenol 650 mg for pain relief. Please take each every 6 hours, and for optimal pain relief - please stagger the medications so that you are taking one or the other every 3 hours. If you had a nerve block, the effects may last 8-12 hours. If you have been prescribed additional pain medications, please take as instructed and as needed. If you are taking additional pain medications, please do not exceed 4000 mg of Tylenol daily from all sources. Also, if you are taking narcotic medications, please do not operate heavy machinery or drive. If you have been prescribed antibiotics, please also take as instructed.     Diet: Start with clear liquids and slow down if nauseated. Advance the diet as tolerated.    Weight-bearing/Activities: Move your fingers to prevent stiffness. Elevate the affected extremity to improve throbbing sensation or pain. No strenuous activities and do not raise your heart rate above 100 bpm for the first few weeks. Limit your weight-bearing with the affected extremity to 5-7 pounds unless otherwise specified.  No strong  activities.    ER Instructions: Please call the office and/or consider return to the ER if you experience worsening pain not relieved by medications, increased swelling, redness or high fevers >101F or if there are unexpected problems like shortness of breath.    Post-op follow-up: Clinic in 10-14 days with Dr. Bonilla or his PA at the [place] [facility] or [facility]. Please call our Ortho triage line if  you have any questions or concerns before your clinic visit: [phone].    Goyo Bonilla MD, PhD, FACS      Contacting your Doctor -   To contact a doctor, call The Ortho Triage Line at #[phone], during clinic hours,   if after hours or on the weekend, please call the hospital  at  [phone] and ask for the resident on call for Orthopedics (answered 24 hours a day)   Emergency Department:  [facility]: [phone]    [phone] if you are in need of immediate or emergent help

## 2025-04-16 NOTE — PROGRESS NOTES
Ortho Hand    No changes in history or examination.  Medically optimized.    OR today for right thumb, middle, ring and small finger A1 pulley releases. He preferred separate longitudinally-oriented incisions.     Discussed the risks of surgery, including but not limited to: infection, bleeding, pain, poor scarring, wound healing issues, injury to nerves and/or tendons, neuroma formation, recurrence of the condition including pain, stiffness, need for revision or additional surgery, complex regional pain syndrome, anesthesia-related complication. Despite these risks, patient consents to RIGHT thumb A1 pulley release, RIGHT middle, ring and small finger A1 pulley releases.  All questions answered.    Goyo Bonilla MD, PhD, FACS

## 2025-04-16 NOTE — OP NOTE
HAND SURGERY OPERATIVE REPORT     Date of Surgery: 4/16/2025  Surgical Service: Ortho Hand     Preoperative Diagnosis:   Right thumb triggering  Right middle finger triggering  Right ring finger triggering  Right small finger triggering     Postoperative Diagnosis: Same as preoperative diagnoses     Procedures Performed:   Right thumb A1 pulley release  Right middle finger A1 pulley release  Right ring finger A1 pulley release  Right small finger A1 pulley release     Attending:  MERVIN Bonilla MD, PhD, FACS  Assistant: None     Complications: None apparent  Specimens: None  Implants: None  Estimated blood loss: 5 mL  Tourniquet time: 15 minutes  Wound classification: Clean  Anesthesia: MAC with local     Indications for Procedure: This is a 55-year-old with right thumb, middle, ring and small finger triggering refractory to conservative management and with minimal response to last steroid injection >3 months ago. Patient would like to undergo surgery to relieve the triggering. No changes in history or exam.      Intraoperative findings: Right thumb, middle, ring and small finger A1 pulleys were fully released. Digital nerves were identified and protected throughout.      Description of Procedure: Patient was seen in the preoperative holding area.  Consent was verified.  The right thumb, middle, ring and small finger were marked.  All additional questions were answered.  The risks of the surgery were reiterated, including (but not limited to): infection, bleeding, scarring, pain, injury to surrounding structures including nerves and tendons, need for additional revision surgery, neuroma formation, complex regional pain syndrome, stiffness, incomplete release, recurrence of triggering.  Following discussion of all these risks, the patient again agreed to proceed with surgery.  Patient was then transferred to the operating room and placed supine on the operating table.  All pressure points were padded.  Sequential  compression devices were placed on bilateral lower extremities and verified to be operational.  Preinduction timeout was performed.  Monitored anesthesia care was commenced.  The right thumb, middle, ring and small finger bases were infiltrated subdermally using 1% lidocaine plain. The right hand was prepped and draped in usual sterile fashion. The forearm tourniquet was inflated to 250 mm Hg. Next, a transversely-oriented incision was made in the skin crease over the A1 pulley at the base of the thumb. Once through skin, tenotomies were used to dissect down onto the A1 pulley. The radial digital nerve was visualized and gently retracted out of the way. A Ray-Lucien was used to sweep the fat off of the flexor sheath. Once exposed, the A1 pulley was longitudinally and fully divided under direct visualization with a #15 blade. Once done, the thumb could be fully extended with no issues. Next, a Ragnell was use to pull the flexor pollicis longus out of the wound to gently perform a traction tenolysis along with confirming full release.     The right middle, ring and small finger A1 pulley releases were done in the same fashion.  Based on preoperative discussion, patient preferred separate longitudinally oriented incisions for each digit.  These incisions were made using a #15 blade.  Once through skin, gentle tenotomy dissection was done down onto the flexor pulley sheath.  A Ray-Lucien was used to sweep the fat off of the flexor sheath.  Ragnell retractors were placed to protect the digital neurovascular bundles.  Next, under direct vision, the A1 pulley was released fully using a #15 blade.  This was done for each of the right middle, ring and small fingers.  Once the A1 pulleys were released, a Ragnell was used to pull the flexor digitorum superficialis and profundus tendons out of the wound as part of a traction tenolysis.    Next, the tourniquet was taken down. The surgical wounds were irrigated with sterile saline. The  "skin was closed with 4-0 Nylon interrupted horizontal mattress suture with care taken to take only superficial bites and skin-only. The incision was dressed with Xeroform, bacitracin, 4 x 4\" gauze, cast padding and a 2\" inch ACE bandage. Patient woke up, tolerated the procedure and was transferred to the PACU with no events.      Postoperative plan: Clinic in 10-14 days.     Goyo Bonilla MD, PhD, FACS  "

## 2025-04-18 NOTE — ANESTHESIA POSTPROCEDURE EVALUATION
Patient: Salvatore Kim    Procedure: Procedure(s):  RIGHT thumb, middle, ring and small finger A1 pulley releases       Anesthesia Type:  MAC    Note:  Disposition: Outpatient   Postop Pain Control: Uneventful            Sign Out: Well controlled pain   PONV: No   Neuro/Psych: Uneventful            Sign Out: Acceptable/Baseline neuro status   Airway/Respiratory: Uneventful            Sign Out: Acceptable/Baseline resp. status   CV/Hemodynamics: Uneventful            Sign Out: Acceptable CV status; No obvious hypovolemia; No obvious fluid overload   Other NRE: NONE   DID A NON-ROUTINE EVENT OCCUR? No           Last vitals:  Vitals Value Taken Time   /67 04/16/25 1723   Temp 36.5  C (97.7  F) 04/16/25 1723   Pulse 64 04/16/25 1653   Resp 16 04/16/25 1723   SpO2 99 % 04/16/25 1724   Vitals shown include unfiled device data.    Electronically Signed By: Rich Urbina MD  April 17, 2025  11:19 PM

## 2025-04-21 ENCOUNTER — TELEPHONE (OUTPATIENT)
Dept: ORTHOPEDICS | Facility: CLINIC | Age: 55
End: 2025-04-21
Payer: COMMERCIAL

## 2025-04-21 NOTE — TELEPHONE ENCOUNTER
Called patient back.  Booked for 4/25 with Briseida.  Informed patient that we will keep his 4/30 appt for now, and cancel if all goes well on 4/25.  Otherwise, they'd need him to come back the following week.  Patient understands.  Clinic address/location was reviewed with him.    Nathaniel Boo MS, ATC, LAT, [facility]  Dr. Luis Edward

## 2025-04-21 NOTE — TELEPHONE ENCOUNTER
Called and spoke with patient to notify him that Briseida does not have availability on 4/28 or 4/29.  Will route to hand team to see if 4/25 is too soon for post op visit.  DOS: 4/16/25.     Nathaniel Boo, ATC

## 2025-04-21 NOTE — TELEPHONE ENCOUNTER
Appointment     Reason for Call: Patient is requesting to reschedule his PO appt on 04/30 to either 04/28 or 04/29    Could we send this information to you in [employer] or would you prefer to receive a phone call?:   Patient would prefer a phone call   Okay to leave a detailed message?: No at Cell number on file:    Telephone Information:   Mobile [phone]

## 2025-04-21 NOTE — TELEPHONE ENCOUNTER
Received voicemail from patient requesting a call back from the clinic     Spoke with: Salvatore Chase had initially called to reschedule his post-op, but was able to connect with the clinical team regarding this    Informed him we have orders for his next surgery and inquired he would like to schedule this now, and he agreed    Date of Surgery: 7/10    Estimated Arrival time Discussed with Patient:  No    Location of surgery: [facility] [facility]    Pre-operative H&P: patient confirmed they will schedule with Nghia Momin PA-C    Imaging: No     Post-operative Appointment w/[facility] or Surgeon: Briseida Wright PA-C 7/21 at 11:00 AM    OT: No    Discussed with patient pre-op RN will call 2-3 days prior to surgery with arrival time and instructions:  Yes     Standard Ortho Surgery Packet: Yes  4/21/25 via [employer] Message      Additional Comments: N/A           All patients questions were answered and was instructed to contact the clinic with any questions or concerns.       Michell Balbuena on 4/21/2025 at 5:49 PM

## 2025-07-02 ENCOUNTER — TELEPHONE (OUTPATIENT)
Dept: ORTHOPEDICS | Facility: CLINIC | Age: 55
End: 2025-07-02
Payer: COMMERCIAL

## 2025-07-03 NOTE — TELEPHONE ENCOUNTER
"Phoned patient to reschedule surgery. Patient would like to schedule early August as patient has another procedure that he wants to complete a week before or after this surgery.     The procedure he'll be having is a \"revision gastric bypass - a weight loss surgery\" as he currently has a \"gastric sleeve\". Patient states that the provider performing the weight loss surgery stated that it will be ok to have both surgeries shortly after one another.     Informed patient that writer will reach out to Dr. Bonilla and his team to make sure that it is ok to have both surgeries within a week of each other and writer will tentatively look for a time early August.     Yuko Garduno on 7/3/2025 at 1:34 PM    "

## 2025-07-03 NOTE — TELEPHONE ENCOUNTER
Patient left voice message to reschedule surgery with Dr. Bonilla.    Yuko Garduno on 7/3/2025 at 1:06 PM

## 2025-07-08 ENCOUNTER — LAB (OUTPATIENT)
Dept: LAB | Facility: CLINIC | Age: 55
End: 2025-07-08
Payer: COMMERCIAL

## 2025-07-08 ENCOUNTER — OFFICE VISIT (OUTPATIENT)
Dept: SURGERY | Facility: CLINIC | Age: 55
End: 2025-07-08
Payer: COMMERCIAL

## 2025-07-08 VITALS
HEIGHT: 67 IN | WEIGHT: 312.8 LBS | SYSTOLIC BLOOD PRESSURE: 126 MMHG | BODY MASS INDEX: 49.09 KG/M2 | DIASTOLIC BLOOD PRESSURE: 72 MMHG

## 2025-07-08 DIAGNOSIS — K91.2 POSTOPERATIVE MALABSORPTION: ICD-10-CM

## 2025-07-08 DIAGNOSIS — R53.83 OTHER FATIGUE: ICD-10-CM

## 2025-07-08 DIAGNOSIS — R79.89 LOW TESTOSTERONE: ICD-10-CM

## 2025-07-08 DIAGNOSIS — E88.810 METABOLIC SYNDROME: ICD-10-CM

## 2025-07-08 DIAGNOSIS — E66.01 SEVERE OBESITY (BMI >= 40) (H): Primary | ICD-10-CM

## 2025-07-08 DIAGNOSIS — E66.813 CLASS 3 SEVERE OBESITY WITH SERIOUS COMORBIDITY AND BODY MASS INDEX (BMI) OF 45.0 TO 49.9 IN ADULT, UNSPECIFIED OBESITY TYPE (H): ICD-10-CM

## 2025-07-08 DIAGNOSIS — K21.9 GASTROESOPHAGEAL REFLUX DISEASE, UNSPECIFIED WHETHER ESOPHAGITIS PRESENT: ICD-10-CM

## 2025-07-08 DIAGNOSIS — K76.0 HEPATIC STEATOSIS: ICD-10-CM

## 2025-07-08 LAB
ALBUMIN SERPL BCG-MCNC: 4.4 G/DL (ref 3.5–5.2)
ALP SERPL-CCNC: 66 U/L (ref 40–150)
ALT SERPL W P-5'-P-CCNC: 30 U/L (ref 0–70)
ANION GAP SERPL CALCULATED.3IONS-SCNC: 11 MMOL/L (ref 7–15)
AST SERPL W P-5'-P-CCNC: 32 U/L (ref 0–45)
BILIRUB SERPL-MCNC: 1.6 MG/DL
BUN SERPL-MCNC: 12.5 MG/DL (ref 6–20)
CALCIUM SERPL-MCNC: 9.6 MG/DL (ref 8.8–10.4)
CHLORIDE SERPL-SCNC: 101 MMOL/L (ref 98–107)
CREAT SERPL-MCNC: 0.95 MG/DL (ref 0.67–1.17)
EGFRCR SERPLBLD CKD-EPI 2021: >90 ML/MIN/1.73M2
ERYTHROCYTE [DISTWIDTH] IN BLOOD BY AUTOMATED COUNT: 14.4 % (ref 10–15)
EST. AVERAGE GLUCOSE BLD GHB EST-MCNC: 123 MG/DL
FERRITIN SERPL-MCNC: 63 NG/ML (ref 31–409)
FOLATE SERPL-MCNC: 31.1 NG/ML (ref 4.6–34.8)
GLUCOSE SERPL-MCNC: 93 MG/DL (ref 70–99)
HBA1C MFR BLD: 5.9 % (ref 0–5.6)
HCO3 SERPL-SCNC: 25 MMOL/L (ref 22–29)
HCT VFR BLD AUTO: 40.1 % (ref 40–53)
HGB BLD-MCNC: 13.2 G/DL (ref 13.3–17.7)
MCH RBC QN AUTO: 28.3 PG (ref 26.5–33)
MCHC RBC AUTO-ENTMCNC: 32.9 G/DL (ref 31.5–36.5)
MCV RBC AUTO: 86 FL (ref 78–100)
PLATELET # BLD AUTO: 236 10E3/UL (ref 150–450)
POTASSIUM SERPL-SCNC: 4.2 MMOL/L (ref 3.4–5.3)
PROT SERPL-MCNC: 7.3 G/DL (ref 6.4–8.3)
PTH-INTACT SERPL-MCNC: 47 PG/ML (ref 15–65)
RBC # BLD AUTO: 4.66 10E6/UL (ref 4.4–5.9)
SODIUM SERPL-SCNC: 137 MMOL/L (ref 135–145)
VIT B12 SERPL-MCNC: 659 PG/ML (ref 232–1245)
VIT D+METAB SERPL-MCNC: 75 NG/ML (ref 20–50)
WBC # BLD AUTO: 6.1 10E3/UL (ref 4–11)

## 2025-07-08 PROCEDURE — 83036 HEMOGLOBIN GLYCOSYLATED A1C: CPT

## 2025-07-08 PROCEDURE — 84590 ASSAY OF VITAMIN A: CPT | Mod: 90

## 2025-07-08 PROCEDURE — 85027 COMPLETE CBC AUTOMATED: CPT

## 2025-07-08 PROCEDURE — 99417 PROLNG OP E/M EACH 15 MIN: CPT | Performed by: FAMILY MEDICINE

## 2025-07-08 PROCEDURE — 84630 ASSAY OF ZINC: CPT | Mod: 90

## 2025-07-08 PROCEDURE — 3078F DIAST BP <80 MM HG: CPT | Performed by: FAMILY MEDICINE

## 2025-07-08 PROCEDURE — 36415 COLL VENOUS BLD VENIPUNCTURE: CPT

## 2025-07-08 PROCEDURE — 82746 ASSAY OF FOLIC ACID SERUM: CPT

## 2025-07-08 PROCEDURE — 83970 ASSAY OF PARATHORMONE: CPT

## 2025-07-08 PROCEDURE — 3044F HG A1C LEVEL LT 7.0%: CPT | Performed by: FAMILY MEDICINE

## 2025-07-08 PROCEDURE — 99000 SPECIMEN HANDLING OFFICE-LAB: CPT

## 2025-07-08 PROCEDURE — 82306 VITAMIN D 25 HYDROXY: CPT

## 2025-07-08 PROCEDURE — 82607 VITAMIN B-12: CPT

## 2025-07-08 PROCEDURE — G2211 COMPLEX E/M VISIT ADD ON: HCPCS | Performed by: FAMILY MEDICINE

## 2025-07-08 PROCEDURE — 82728 ASSAY OF FERRITIN: CPT

## 2025-07-08 PROCEDURE — 80053 COMPREHEN METABOLIC PANEL: CPT

## 2025-07-08 PROCEDURE — 3074F SYST BP LT 130 MM HG: CPT | Performed by: FAMILY MEDICINE

## 2025-07-08 PROCEDURE — 84425 ASSAY OF VITAMIN B-1: CPT | Mod: 90

## 2025-07-08 PROCEDURE — 99215 OFFICE O/P EST HI 40 MIN: CPT | Performed by: FAMILY MEDICINE

## 2025-07-08 RX ORDER — OMEPRAZOLE 40 MG/1
CAPSULE, DELAYED RELEASE ORAL
COMMUNITY
Start: 2025-06-02

## 2025-07-08 ASSESSMENT — SLEEP AND FATIGUE QUESTIONNAIRES
HOW LIKELY ARE YOU TO NOD OFF OR FALL ASLEEP WHILE LYING DOWN TO REST IN THE AFTERNOON WHEN CIRCUMSTANCES PERMIT: MODERATE CHANCE OF DOZING
HOW LIKELY ARE YOU TO NOD OFF OR FALL ASLEEP WHILE SITTING AND TALKING TO SOMEONE: WOULD NEVER DOZE
HOW LIKELY ARE YOU TO NOD OFF OR FALL ASLEEP WHILE SITTING AND READING: SLIGHT CHANCE OF DOZING
HOW LIKELY ARE YOU TO NOD OFF OR FALL ASLEEP WHEN YOU ARE A PASSENGER IN A CAR FOR AN HOUR WITHOUT A BREAK: WOULD NEVER DOZE
HOW LIKELY ARE YOU TO NOD OFF OR FALL ASLEEP WHILE WATCHING TV: WOULD NEVER DOZE
HOW LIKELY ARE YOU TO NOD OFF OR FALL ASLEEP WHILE SITTING INACTIVE IN A PUBLIC PLACE: WOULD NEVER DOZE
HOW LIKELY ARE YOU TO NOD OFF OR FALL ASLEEP IN A CAR, WHILE STOPPED FOR A FEW MINUTES IN TRAFFIC: SLIGHT CHANCE OF DOZING
HOW LIKELY ARE YOU TO NOD OFF OR FALL ASLEEP WHILE SITTING QUIETLY AFTER LUNCH WITHOUT ALCOHOL: SLIGHT CHANCE OF DOZING

## 2025-07-08 NOTE — Clinical Note
Salvatore will be a revision sleeve to RYGB or KAROL. I sent a message to Dr. Maguire to see if he wants to do an EGD up front to assess GERD. His sleeve was done in [place]. He has GERD and weight recurrence. He will need to be scheduled with Sharmaine

## 2025-07-08 NOTE — PROGRESS NOTES
"New Bariatric Surgery Consultation Note    2025    RE: Salvatore Kim  MR#: [id number]  : 1970      Referring provider:  Nghia Momin PA-C    Chief Complaint/Reason for visit: evaluation for possible revision of weight loss surgery. GERD s/p sleeve gastrectomy.  Weight recurrence.    Dear Nghia Momin PA-C    I had the pleasure of seeing your patient, Salvatore Kim, to evaluate his obesity and consider him for possible revision of his weight loss surgery. As you know, Salvatore Kim is 55 year old.  He has a height of 5' 7\", a weight of 312 lbs 12.8 oz, and calculated Body mass index is 48.99 kg/m . He had a sleeve gastrectomy done in [place] in 2021. His highest weight was 325# and he weighed 310# prior to his sleeve gastrectomy. He lost to 245# and gradually regained his weight to above his preoperative weight. He has been taking his vitamins with consistency. He developed GERD after his sleeve gastrectomy which was severe even at 245#. He takes omeprazole daily. Still, if eating close to bed time he will wake up choking with food in his throat. He did not have follow up care following surgery. He quit smoking 10 years ago. He rarely drinks alcohol and avoids NSAIDS due to his sleeve gastrectomy the best he can despite multiple orthopedic concerns; neck, back, and hands. Liver steatosis and coronary calcifications are noted incidentally on Low Dose CT scan done due to qualifying tobacco use history. Wegovy and Zepbound are not covered benefits.        HISTORY OF PRESENT ILLNESS:  Sleeve gastrectomy in [place] . Initial weight 310 lost to 250#. Weight gradually came back. Taking Bariatric vitamin and vitamin C, Probiotics and Vitamin D. Did not have GERD before surgery. Now needing Omeprazole daily for GERD. Orthopedic pain is severe with weight recurrence. Planning on trigger finger surgery. Mentions he would like to have that done at the same time as his revision. He has a high " likelihood of moderate to severe ROSEMARIE and has had sleep studies ordered in 2017 and 2018 which have not yet been done.       CO-MORBIDITIES OF OBESITY INCLUDE:  Dyslipidemia  DDD with low back pain,  Metabolic syndrome  Liver Steatosis  CAD  Cholelithiasis-incidental    PAST MEDICAL HISTORY:  Past Medical History:   Diagnosis Date    Allergies     Arthritis     Cholelithiasis     Incidental finding LDCT scan    Coronary artery calcification seen on CT scan     Depression, anxiety     ED (erectile dysfunction)     Hepatic steatosis     Hyperlipidemia     Low testosterone     Metabolic syndrome     Severe obesity (BMI >= 40) (H)     Trigger finger, right        PAST SURGICAL HISTORY:  Past Surgical History:   Procedure Laterality Date    COLONOSCOPY  6/7/2013    Procedure: COLONOSCOPY;  COLONOSCOPY, EXAM UNDER ANESTHESIA,  FISTULOTOMY;  Surgeon: Maxwell Israel MD;  Location: [facility]    EXAM UNDER ANESTHESIA ANUS  6/7/2013    Procedure: EXAM UNDER ANESTHESIA ANUS;;  Surgeon: Maxwell Israel MD;  Location: [facility]    FISTULOTOMY RECTUM  6/7/2013    Procedure: FISTULOTOMY RECTUM;;  Surgeon: Maxwell Israel MD;  Location: [facility]    HEMORRHOID SURGERY      RELEASE CARPAL TUNNEL  2001    Rt    RELEASE TRIGGER FINGER Right 4/16/2025    Procedure: RIGHT thumb, middle, ring and small finger A1 pulley releases;  Surgeon: Goyo Bonilla MD;  Location: [facility]    ROTATOR CUFF REPAIR RT/LT Right 2017       FAMILY HISTORY:   Family History   Problem Relation Age of Onset    Myocardial Infarction Father 45    Heart Disease Father     C.A.D. Father     Myocardial Infarction Paternal Grandfather 45    Myocardial Infarction Maternal Grandfather 45    Cancer Mother     Diabetes Paternal Grandmother     Diabetes Paternal Aunt     Cancer - colorectal No family hx of     Prostate Cancer No family hx of     Hypertension No family hx of     Cerebrovascular Disease No family hx of     Thyroid Disease No family hx of     Glaucoma No family  hx of     Macular Degeneration No family hx of        SOCIAL HISTORY:   Social History     Socioeconomic History    Marital status:      Spouse name: Not on file    Number of children: 4    Years of education: Not on file    Highest education level: Not on file   Occupational History    Occupation: [employer]     Employer: [employer]   Tobacco Use    Smoking status: Former     Current packs/day: 1.50     Average packs/day: 1.5 packs/day for 25.0 years (37.5 ttl pk-yrs)     Types: Cigarettes    Smokeless tobacco: Never    Tobacco comments:     Smoke age [age]   Vaping Use    Vaping status: Never Used   Substance and Sexual Activity    Alcohol use: Yes     Alcohol/week: 3.3 standard drinks of alcohol     Types: 4 Standard drinks or equivalent per week     Comment: RARE, socially     Drug use: Not Currently     Types: Marijuana     Comment: rare    Sexual activity: Yes     Partners: Female   Other Topics Concern    Parent/sibling w/ CABG, MI or angioplasty before 65F 55M? Yes     Comment: father with MI    Social History Narrative    . He has a girlfriend and 4 children. Lives with his girlfriend and his son who is 29 yo and has Aspergers. He works FT as a [employer].      Social Drivers of Health     Financial Resource Strain: Not on file   Food Insecurity: Not on file   Transportation Needs: Not on file   Physical Activity: Not on file   Stress: Not on file (11/9/2024)   Social Connections: Not on file   Interpersonal Safety: Low Risk  (4/16/2025)    Interpersonal Safety     Do you feel physically and emotionally safe where you currently live?: Yes     Within the past 12 months, have you been hit, slapped, kicked or otherwise physically hurt by someone?: No     Within the past 12 months, have you been humiliated or emotionally abused in other ways by your partner or ex-partner?: No   Housing Stability: Not on file         HABITS:  Smoked for 30 years. Quit age 45 in 2015.  Rare alcohol.   Remote  "Marijuana    PSYCHOLOGICAL HISTORY:   Panic attacks in his 20's    ROS:  Low back pain, fatigue, joint pain, trigger finger multiple fingers,       EATING BEHAVIORS:  B: coffee, protein shake, or skips L: leftover santos amezcua stauffers, D: Chicken and veggies  Restaurant eating: daily dinner occ lunch. Now girlfriend cooks  Rare Soda  Snacks on sweets    EXERCISE:      7/8/2025     3:08 PM   --   How often do you exercise? 3 to 4 times per week   What is the duration of your exercise (in minutes)? 45 Minutes   What types of exercise do you do? other   What keeps you from being more active? Pain    Too tired       MEDICATIONS:  Current Outpatient Medications   Medication Sig Dispense Refill    EPINEPHrine (ANY BX GENERIC EQUIV) 0.3 MG/0.3ML injection 2-pack Inject 0.3 mLs (0.3 mg) into the muscle once as needed for anaphylaxis 2 mL 0    omeprazole (PRILOSEC) 40 MG DR capsule       tadalafil (CIALIS) 5 MG tablet Take 1 tablet (5 mg) by mouth every 24 hours 90 tablet 3       ALLERGIES:  Allergies   Allergen Reactions    Bee Venom Swelling    Chocolate Swelling    Nsaids Other (See Comments)     Avoid due to potential/increased risk for ulcer post sleeve gastrectomy    Avoid due to potential/increased risk for ulcer post sleeve gastrectomy.       LABS/IMAGING/MEDICAL RECORDS REVIEWED    PHYSICAL EXAM:  /72 (BP Location: Right arm, Patient Position: Sitting, Cuff Size: Adult Regular)   Ht 1.702 m (5' 7\")   Wt (!) 141.9 kg (312 lb 12.8 oz)   BMI 48.99 kg/m    Pleasant and in no distress  Neck 20.25\" Mallampati 3+  Heart regular  Lungs clear  Abdminal circumference 62\", laparoscopic scars   Bilateral pitting lower extremity edema  Euthymic  Gait normal  No tremor    In summary, Salvatore Kim has GERD post-sleeve, Metabolic syndrome, possible ROSEMARIE, CAD, DDD, and cholelithiasis in the setting of morbid obesity. His Body mass index is 48.99 kg/m . This satisfies NIH criteria for bariatric surgery. Once Salvatore " has been cleared by our bariatric psychologist and our bariatric dietitian, completed initial lab work, attended one support group, and satisfied insurance mandated visits if applicable, he  will be scheduled with  Dr. Maguire for Bariatric Surgery Consultation. He is interested in the  KAROL or RYGB.  Salvatore understands that routine health care maintenance will need to be up to date prior to surgery. He verbalizes understanding of the process to surgery and expectations for the postoperative period including the need for lifelong lifestyle changes, vitamin supplementation, and laboratory monitoring.       Weight will need to be 312# prior to submitting for insurance approval.  Standard DVT protocol  Consider cholecystectomy  Sleep Study was ordered. He will bring CPAP to the hospital on the morning of surgery if he has moderate or severe ROSEMARIE requiring CPAP  Health Care Maintenance is UTD Colonoscopy 2021          Sincerely,        Susie Arshad MD, MPH, FAAFP  Diplomate, American Board of Obesity Medicine         Total time spent on the date of this encounter doing: chart review, review of test results, patient visit, physical exam, education, counseling, developing plan of care, and documenting = 60 minutes.

## 2025-07-08 NOTE — PATIENT INSTRUCTIONS
Iron Rich Foods  The amount and type of iron in your diet is important. Some iron-rich foods are:  Meat and Eggs  Beef   Lamb   Ham   Turkey   Chicken   Veal   Pork   Dried beef   Liver   Liverwurst   Eggs (any style)  Seafood  Shrimp   Clams   Scallops   Oysters   Tuna   Sardines   [place]   Mackerel  Vegetables  Spinach   Sweet potatoes   Peas   Broccoli   String beans   Beet greens   Dandelion greens   Collards   Kale   Chard  Bread and Cereals   Whole wheat bread   Enriched pasta   Wheat products   Bran cereals   Corn meal   Oat cereal   Cream of Wheat   Rye bread   Fruit  Strawberries   Watermelon   Raisins   Dates   Figs   Prunes   Prune juice   Dried apricots   Dried peaches  Beans and Other Foods  Tofu   Beans (kidney, garbanzo, or white, canned)   Tomato products (e.g., paste)   Dried peas   Dried beans   Lentils

## 2025-07-08 NOTE — LETTER
"2025      Salvatore Kim  [address]  [place] [address]      Dear Colleague,    Thank you for referring your patient, Salvatore Kim, to the [facility] AND [facility]. Please see a copy of my visit note below.    New Bariatric Surgery Consultation Note    2025    RE: Salvatore Kim  MR#: [id number]  : 1970      Referring provider:  Nghia Momin PA-C    Chief Complaint/Reason for visit: evaluation for possible revision of weight loss surgery. GERD s/p sleeve gastrectomy.  Weight recurrence.    Dear Nghia Momin PA-C    I had the pleasure of seeing your patient, Salvatore Kim, to evaluate his obesity and consider him for possible revision of his weight loss surgery. As you know, Salvatore Kim is 55 year old.  He has a height of 5' 7\", a weight of 312 lbs 12.8 oz, and calculated Body mass index is 48.99 kg/m . He had a sleeve gastrectomy done in [place] in 2021. His highest weight was 325# and he weighed 310# prior to his sleeve gastrectomy. He lost to 245# and gradually regained his weight to above his preoperative weight. He has been taking his vitamins with consistency. He developed GERD after his sleeve gastrectomy which was severe even at 245#. He takes omeprazole daily. Still, if eating close to bed time he will wake up choking with food in his throat. He did not have follow up care following surgery. He quit smoking 10 years ago. He rarely drinks alcohol and avoids NSAIDS due to his sleeve gastrectomy the best he can despite multiple orthopedic concerns; neck, back, and hands. Liver steatosis and coronary calcifications are noted incidentally on Low Dose CT scan done due to qualifying tobacco use history. Wegovy and Zepbound are not covered benefits.        HISTORY OF PRESENT ILLNESS:  Sleeve gastrectomy in [place] . Initial weight 310 lost to 250#. Weight gradually came back. Taking Bariatric vitamin and vitamin C, Probiotics " and Vitamin D. Did not have GERD before surgery. Now needing Omeprazole daily for GERD. Orthopedic pain is severe with weight recurrence. Planning on trigger finger surgery. Mentions he would like to have that done at the same time as his revision. He has a high likelihood of moderate to severe ROSEMARIE and has had sleep studies ordered in 2017 and 2018 which have not yet been done.       CO-MORBIDITIES OF OBESITY INCLUDE:  Dyslipidemia  DDD with low back pain,  Metabolic syndrome  Liver Steatosis  CAD  Cholelithiasis-incidental    PAST MEDICAL HISTORY:  Past Medical History:   Diagnosis Date     Allergies      Arthritis      Cholelithiasis     Incidental finding LDCT scan     Coronary artery calcification seen on CT scan      Depression, anxiety      ED (erectile dysfunction)      Hepatic steatosis      Hyperlipidemia      Low testosterone      Metabolic syndrome      Severe obesity (BMI >= 40) (H)      Trigger finger, right        PAST SURGICAL HISTORY:  Past Surgical History:   Procedure Laterality Date     COLONOSCOPY  6/7/2013    Procedure: COLONOSCOPY;  COLONOSCOPY, EXAM UNDER ANESTHESIA,  FISTULOTOMY;  Surgeon: Maxwell Israel MD;  Location: [facility]     EXAM UNDER ANESTHESIA ANUS  6/7/2013    Procedure: EXAM UNDER ANESTHESIA ANUS;;  Surgeon: Maxwell Israel MD;  Location: [facility]     FISTULOTOMY RECTUM  6/7/2013    Procedure: FISTULOTOMY RECTUM;;  Surgeon: Maxwell Israel MD;  Location: [facility]     HEMORRHOID SURGERY       RELEASE CARPAL TUNNEL  2001    Rt     RELEASE TRIGGER FINGER Right 4/16/2025    Procedure: RIGHT thumb, middle, ring and small finger A1 pulley releases;  Surgeon: Goyo Bonilla MD;  Location: [facility] OR     ROTATOR CUFF REPAIR RT/LT Right 2017       FAMILY HISTORY:   Family History   Problem Relation Age of Onset     Myocardial Infarction Father 45     Heart Disease Father      C.A.D. Father      Myocardial Infarction Paternal Grandfather 45     Myocardial Infarction Maternal Grandfather 45      Cancer Mother      Diabetes Paternal Grandmother      Diabetes Paternal Aunt      Cancer - colorectal No family hx of      Prostate Cancer No family hx of      Hypertension No family hx of      Cerebrovascular Disease No family hx of      Thyroid Disease No family hx of      Glaucoma No family hx of      Macular Degeneration No family hx of        SOCIAL HISTORY:   Social History     Socioeconomic History     Marital status:      Spouse name: Not on file     Number of children: 4     Years of education: Not on file     Highest education level: Not on file   Occupational History     Occupation: [employer]     Employer: [employer]   Tobacco Use     Smoking status: Former     Current packs/day: 1.50     Average packs/day: 1.5 packs/day for 25.0 years (37.5 ttl pk-yrs)     Types: Cigarettes     Smokeless tobacco: Never     Tobacco comments:     Smoke age [age]   Vaping Use     Vaping status: Never Used   Substance and Sexual Activity     Alcohol use: Yes     Alcohol/week: 3.3 standard drinks of alcohol     Types: 4 Standard drinks or equivalent per week     Comment: RARE, socially      Drug use: Not Currently     Types: Marijuana     Comment: rare     Sexual activity: Yes     Partners: Female   Other Topics Concern     Parent/sibling w/ CABG, MI or angioplasty before 65F 55M? Yes     Comment: father with MI    Social History Narrative    . He has a girlfriend and 4 children. Lives with his girlfriend and his son who is 31 yo and has Aspergers. He works FT as a [employer].      Social Drivers of Health     Financial Resource Strain: Not on file   Food Insecurity: Not on file   Transportation Needs: Not on file   Physical Activity: Not on file   Stress: Not on file (11/9/2024)   Social Connections: Not on file   Interpersonal Safety: Low Risk  (4/16/2025)    Interpersonal Safety      Do you feel physically and emotionally safe where you currently live?: Yes      Within the past 12 months, have you been  "hit, slapped, kicked or otherwise physically hurt by someone?: No      Within the past 12 months, have you been humiliated or emotionally abused in other ways by your partner or ex-partner?: No   Housing Stability: Not on file         HABITS:  Smoked for 30 years. Quit age 45 in 2015.  Rare alcohol.   Remote Marijuana    PSYCHOLOGICAL HISTORY:   Panic attacks in his 20's    ROS:  Low back pain, fatigue, joint pain, trigger finger multiple fingers,       EATING BEHAVIORS:  B: coffee, protein shake, or skips L: leftover lasagne, spaghetti, stauffers, D: Chicken and veggies  Restaurant eating: daily dinner occ lunch. Now girlfriend cooks  Rare Soda  Snacks on sweets    EXERCISE:      7/8/2025     3:08 PM   --   How often do you exercise? 3 to 4 times per week   What is the duration of your exercise (in minutes)? 45 Minutes   What types of exercise do you do? other   What keeps you from being more active? Pain    Too tired       MEDICATIONS:  Current Outpatient Medications   Medication Sig Dispense Refill     EPINEPHrine (ANY BX GENERIC EQUIV) 0.3 MG/0.3ML injection 2-pack Inject 0.3 mLs (0.3 mg) into the muscle once as needed for anaphylaxis 2 mL 0     omeprazole (PRILOSEC) 40 MG DR capsule        tadalafil (CIALIS) 5 MG tablet Take 1 tablet (5 mg) by mouth every 24 hours 90 tablet 3       ALLERGIES:  Allergies   Allergen Reactions     Bee Venom Swelling     Chocolate Swelling     Nsaids Other (See Comments)     Avoid due to potential/increased risk for ulcer post sleeve gastrectomy    Avoid due to potential/increased risk for ulcer post sleeve gastrectomy.       LABS/IMAGING/MEDICAL RECORDS REVIEWED    PHYSICAL EXAM:  /72 (BP Location: Right arm, Patient Position: Sitting, Cuff Size: Adult Regular)   Ht 1.702 m (5' 7\")   Wt (!) 141.9 kg (312 lb 12.8 oz)   BMI 48.99 kg/m    Pleasant and in no distress  Neck 20.25\" Mallampati 3+  Heart regular  Lungs clear  Abdminal circumference 62\", laparoscopic scars "   Bilateral pitting lower extremity edema  Euthymic  Gait normal  No tremor    In summary, Salvatore Kim has GERD post-sleeve, Metabolic syndrome, possible ROSEMARIE, CAD, DDD, and cholelithiasis in the setting of morbid obesity. His Body mass index is 48.99 kg/m . This satisfies NIH criteria for bariatric surgery. Once Salvatore has been cleared by our bariatric psychologist and our bariatric dietitian, completed initial lab work, attended one support group, and satisfied insurance mandated visits if applicable, he  will be scheduled with  Dr. Maguire for Bariatric Surgery Consultation. He is interested in the  KAROL or RYGB.  Salvatore understands that routine health care maintenance will need to be up to date prior to surgery. He verbalizes understanding of the process to surgery and expectations for the postoperative period including the need for lifelong lifestyle changes, vitamin supplementation, and laboratory monitoring.       Weight will need to be 312# prior to submitting for insurance approval.  Standard DVT protocol  Consider cholecystectomy  Sleep Study was ordered. He will bring CPAP to the hospital on the morning of surgery if he has moderate or severe ROSEMARIE requiring CPAP  Health Care Maintenance is UTD Colonoscopy 2021          Sincerely,        Susie Arshad MD, MPH, FAAFP  Diplomate, American Board of Obesity Medicine         Total time spent on the date of this encounter doing: chart review, review of test results, patient visit, physical exam, education, counseling, developing plan of care, and documenting = 60 minutes.       Again, thank you for allowing me to participate in the care of your patient.        Sincerely,        Susie Arshad MD    Electronically signed

## 2025-07-09 ENCOUNTER — PATIENT OUTREACH (OUTPATIENT)
Dept: CARE COORDINATION | Facility: CLINIC | Age: 55
End: 2025-07-09
Payer: COMMERCIAL

## 2025-07-12 LAB
ANNOTATION COMMENT IMP: NORMAL
RETINYL PALMITATE SERPL-MCNC: 0.03 MG/L
VIT A SERPL-MCNC: 0.59 MG/L
VIT B1 PYROPHOSHATE BLD-SCNC: 159 NMOL/L

## 2025-07-21 ENCOUNTER — ANESTHESIA EVENT (OUTPATIENT)
Dept: SURGERY | Facility: CLINIC | Age: 55
End: 2025-07-21
Payer: COMMERCIAL

## 2025-07-21 ASSESSMENT — COPD QUESTIONNAIRES: COPD: 0

## 2025-07-21 ASSESSMENT — ENCOUNTER SYMPTOMS: SEIZURES: 0

## 2025-07-21 NOTE — ANESTHESIA PREPROCEDURE EVALUATION
Anesthesia Pre-Procedure Evaluation    Patient: Salvatore Kim   MRN: [id number] : 1970          Procedure : Procedure(s):  Left index and middle finger A1 pulley releases         Past Medical History:   Diagnosis Date    Allergies     Arthritis     Cholelithiasis     Incidental finding LDCT scan    Coronary artery calcification seen on CT scan     Depression, anxiety     ED (erectile dysfunction)     Hepatic steatosis     Hyperlipidemia     Low testosterone     Metabolic syndrome     Severe obesity (BMI >= 40) (H)     Trigger finger, right       Past Surgical History:   Procedure Laterality Date    COLONOSCOPY  2013    Procedure: COLONOSCOPY;  COLONOSCOPY, EXAM UNDER ANESTHESIA,  FISTULOTOMY;  Surgeon: Maxwell Israel MD;  Location: [facility]    EXAM UNDER ANESTHESIA ANUS  2013    Procedure: EXAM UNDER ANESTHESIA ANUS;;  Surgeon: Maxwell Israel MD;  Location: [facility]    FISTULOTOMY RECTUM  2013    Procedure: FISTULOTOMY RECTUM;;  Surgeon: Maxwell Israel MD;  Location: [facility]    HEMORRHOID SURGERY      RELEASE CARPAL TUNNEL  2001    Rt    RELEASE TRIGGER FINGER Right 2025    Procedure: RIGHT thumb, middle, ring and small finger A1 pulley releases;  Surgeon: Goyo Bonilla MD;  Location: [facility] OR    ROTATOR CUFF REPAIR RT/LT Right 2017      Allergies   Allergen Reactions    Bee Venom Swelling    Chocolate Swelling    Nsaids Other (See Comments)     Avoid due to potential/increased risk for ulcer post sleeve gastrectomy    Avoid due to potential/increased risk for ulcer post sleeve gastrectomy.      Social History     Tobacco Use    Smoking status: Former     Current packs/day: 1.50     Average packs/day: 1.5 packs/day for 25.0 years (37.5 ttl pk-yrs)     Types: Cigarettes    Smokeless tobacco: Never    Tobacco comments:     Smoke age [age]   Substance Use Topics    Alcohol use: Yes     Alcohol/week: 3.3 standard drinks of alcohol     Types: 4 Standard drinks or equivalent per week      Comment: RARE, socially       Wt Readings from Last 1 Encounters:   07/08/25 (!) 141.9 kg (312 lb 12.8 oz)        Anesthesia Evaluation   Pt has had prior anesthetic. Type: General and MAC.    No history of anesthetic complications       ROS/MED HX  ENT/Pulmonary:     (+)     ROSEMARIE risk factors,                                (-) COPD and recent URI   Neurologic:    (-) no seizures and no CVA   Cardiovascular:     (+) Dyslipidemia - -   -  - -                                 Previous cardiac testing   Echo: Date: 7/7/16 Results:  Interpretation Summary     Left ventricular function, chamber size, wall motion, and wall thickness are  normal.The EF is 60-65%. Normal diastolic funciton  Right ventricular function, chamber size, wall motion, and thickness are  normal.  Normal RA pressure    Stress Test:  Date: Results:    ECG Reviewed:  Date: Results:    Cath:  Date: Results:   (-) taking anticoagulants/antiplatelets and CHF   METS/Exercise Tolerance:     Hematologic:    (-) history of blood clots and anemia   Musculoskeletal:   (+)  arthritis,             GI/Hepatic: Comment: H/o gastric sleeve. One dose of semiglutide 12 days ago. Asymptomatic.     (+) GERD,            liver disease (Hepatic steatosis),       Renal/Genitourinary:    (-) renal disease   Endo:     (+)               Obesity,    (-) Type II DM and thyroid disease   Psychiatric/Substance Use:     (+) psychiatric history depression       Infectious Disease:  - neg infectious disease ROS     Malignancy:  - neg malignancy ROS     Other:              Physical Exam  Airway  Mallampati: III  TM distance: >3 FB  Mouth opening: >= 4 cm    Cardiovascular   Rhythm: regular  Rate: normal rate     Dental   (+) Minor Abnormalities - some fillings, tiny chips      Pulmonary (+) decreased breath sounds   decreased breath sounds     Neurological - normal exam  He appears awake, alert and oriented x3.    Other Findings       OUTSIDE LABS:  CBC:   Lab Results   Component  "Value Date    WBC 6.1 07/08/2025    WBC 9.7 11/04/2022    HGB 13.2 (L) 07/08/2025    HGB 14.6 11/04/2022    HCT 40.1 07/08/2025    HCT 44.6 11/04/2022     07/08/2025     11/04/2022     BMP:   Lab Results   Component Value Date     07/08/2025     01/09/2016    POTASSIUM 4.2 07/08/2025    POTASSIUM 4.1 01/09/2016    CHLORIDE 101 07/08/2025    CHLORIDE 107 01/09/2016    CO2 25 07/08/2025    CO2 29 01/09/2016    BUN 12.5 07/08/2025    BUN 8 01/09/2016    CR 0.95 07/08/2025    CR 0.84 01/09/2016    GLC 93 07/08/2025     (H) 01/09/2016     COAGS: No results found for: \"PTT\", \"INR\", \"FIBR\"  POC: No results found for: \"BGM\", \"HCG\", \"HCGS\"  HEPATIC:   Lab Results   Component Value Date    ALBUMIN 4.4 07/08/2025    PROTTOTAL 7.3 07/08/2025    ALT 30 07/08/2025    AST 32 07/08/2025    ALKPHOS 66 07/08/2025    BILITOTAL 1.6 (H) 07/08/2025     OTHER:   Lab Results   Component Value Date    A1C 5.9 (H) 07/08/2025    RADHA 9.6 07/08/2025    TSH 0.96 05/07/2014    SED 15 01/21/2021       Anesthesia Plan    ASA Status:  3      NPO Status: Will be NPO Appropriate at ...   Anesthesia Type: MAC.  Airway: natural airway.  Induction: intravenous.  Maintenance: TIVA.   Techniques and Equipment:       - Monitoring Plan: standard ASA monitoring     Consents            Postoperative Care    Pain management: non-narcotic analgesics, plan for postoperative opioid use, multimodal analgesia.     Comments:                   Sangita Mcbride MD    I have reviewed the pertinent notes and labs in the chart from the past 30 days and (re)examined the patient.  Any updates or changes from those notes are reflected in this note.    Clinically Significant Risk Factors Present on Admission                             # Morbid Obesity: Estimated body mass index is 48.99 kg/m  as calculated from the following:    Height as of 7/8/25: 1.702 m (5' 7\").    Weight as of 7/8/25: 141.9 kg (312 lb 12.8 oz).                    "

## 2025-07-22 ENCOUNTER — ANESTHESIA (OUTPATIENT)
Dept: SURGERY | Facility: CLINIC | Age: 55
End: 2025-07-22
Payer: COMMERCIAL

## 2025-07-22 ENCOUNTER — HOSPITAL ENCOUNTER (OUTPATIENT)
Facility: CLINIC | Age: 55
Discharge: HOME | End: 2025-07-22
Attending: STUDENT IN AN ORGANIZED HEALTH CARE EDUCATION/TRAINING PROGRAM | Admitting: STUDENT IN AN ORGANIZED HEALTH CARE EDUCATION/TRAINING PROGRAM
Payer: COMMERCIAL

## 2025-07-22 VITALS
BODY MASS INDEX: 48.2 KG/M2 | OXYGEN SATURATION: 99 % | TEMPERATURE: 98.4 F | HEART RATE: 54 BPM | WEIGHT: 307.1 LBS | RESPIRATION RATE: 16 BRPM | DIASTOLIC BLOOD PRESSURE: 79 MMHG | HEIGHT: 67 IN | SYSTOLIC BLOOD PRESSURE: 112 MMHG

## 2025-07-22 DIAGNOSIS — M65.30 TRIGGER FINGER, ACQUIRED: ICD-10-CM

## 2025-07-22 DIAGNOSIS — M65.311 TRIGGER FINGER OF RIGHT THUMB: Primary | ICD-10-CM

## 2025-07-22 PROCEDURE — 710N000012 HC RECOVERY PHASE 2, PER MINUTE: Performed by: STUDENT IN AN ORGANIZED HEALTH CARE EDUCATION/TRAINING PROGRAM

## 2025-07-22 PROCEDURE — 250N000011 HC RX IP 250 OP 636: Performed by: STUDENT IN AN ORGANIZED HEALTH CARE EDUCATION/TRAINING PROGRAM

## 2025-07-22 PROCEDURE — 272N000001 HC OR GENERAL SUPPLY STERILE: Performed by: STUDENT IN AN ORGANIZED HEALTH CARE EDUCATION/TRAINING PROGRAM

## 2025-07-22 PROCEDURE — 250N000011 HC RX IP 250 OP 636: Performed by: NURSE ANESTHETIST, CERTIFIED REGISTERED

## 2025-07-22 PROCEDURE — 250N000011 HC RX IP 250 OP 636: Performed by: CHIROPRACTOR

## 2025-07-22 PROCEDURE — 370N000017 HC ANESTHESIA TECHNICAL FEE, PER MIN: Performed by: STUDENT IN AN ORGANIZED HEALTH CARE EDUCATION/TRAINING PROGRAM

## 2025-07-22 PROCEDURE — 250N000009 HC RX 250: Performed by: NURSE ANESTHETIST, CERTIFIED REGISTERED

## 2025-07-22 PROCEDURE — 999N000141 HC STATISTIC PRE-PROCEDURE NURSING ASSESSMENT: Performed by: STUDENT IN AN ORGANIZED HEALTH CARE EDUCATION/TRAINING PROGRAM

## 2025-07-22 PROCEDURE — 250N000009 HC RX 250: Performed by: STUDENT IN AN ORGANIZED HEALTH CARE EDUCATION/TRAINING PROGRAM

## 2025-07-22 PROCEDURE — 26055 INCISE FINGER TENDON SHEATH: CPT | Mod: FA | Performed by: STUDENT IN AN ORGANIZED HEALTH CARE EDUCATION/TRAINING PROGRAM

## 2025-07-22 PROCEDURE — 250N000009 HC RX 250: Performed by: CHIROPRACTOR

## 2025-07-22 PROCEDURE — 360N000075 HC SURGERY LEVEL 2, PER MIN: Performed by: STUDENT IN AN ORGANIZED HEALTH CARE EDUCATION/TRAINING PROGRAM

## 2025-07-22 PROCEDURE — 258N000003 HC RX IP 258 OP 636: Performed by: NURSE ANESTHETIST, CERTIFIED REGISTERED

## 2025-07-22 RX ORDER — ONDANSETRON 4 MG/1
4 TABLET, ORALLY DISINTEGRATING ORAL EVERY 30 MIN PRN
Status: DISCONTINUED | OUTPATIENT
Start: 2025-07-22 | End: 2025-07-22 | Stop reason: HOSPADM

## 2025-07-22 RX ORDER — OXYCODONE HYDROCHLORIDE 5 MG/1
5 TABLET ORAL EVERY 6 HOURS PRN
Qty: 12 TABLET | Refills: 0 | Status: CANCELLED | OUTPATIENT
Start: 2025-07-22 | End: 2025-07-25

## 2025-07-22 RX ORDER — CEPHALEXIN 500 MG/1
500 CAPSULE ORAL 4 TIMES DAILY
Qty: 12 CAPSULE | Refills: 0 | Status: SHIPPED | OUTPATIENT
Start: 2025-07-22

## 2025-07-22 RX ORDER — ONDANSETRON 2 MG/ML
INJECTION INTRAMUSCULAR; INTRAVENOUS PRN
Status: DISCONTINUED | OUTPATIENT
Start: 2025-07-22 | End: 2025-07-22

## 2025-07-22 RX ORDER — ONDANSETRON 4 MG/1
4 TABLET, FILM COATED ORAL EVERY 8 HOURS PRN
Qty: 12 TABLET | Refills: 0 | Status: SHIPPED | OUTPATIENT
Start: 2025-07-22

## 2025-07-22 RX ORDER — CEPHALEXIN 500 MG/1
500 CAPSULE ORAL 4 TIMES DAILY
Qty: 12 CAPSULE | Refills: 0 | Status: CANCELLED | OUTPATIENT
Start: 2025-07-22

## 2025-07-22 RX ORDER — ASPIRIN 81 MG
100 TABLET, DELAYED RELEASE (ENTERIC COATED) ORAL DAILY
Qty: 12 TABLET | Refills: 0 | Status: SHIPPED | OUTPATIENT
Start: 2025-07-22

## 2025-07-22 RX ORDER — PROPOFOL 10 MG/ML
INJECTION, EMULSION INTRAVENOUS CONTINUOUS PRN
Status: DISCONTINUED | OUTPATIENT
Start: 2025-07-22 | End: 2025-07-22

## 2025-07-22 RX ORDER — TRAMADOL HYDROCHLORIDE 50 MG/1
50 TABLET ORAL EVERY 6 HOURS PRN
Qty: 10 TABLET | Refills: 0 | Status: SHIPPED | OUTPATIENT
Start: 2025-07-22 | End: 2025-07-25

## 2025-07-22 RX ORDER — ONDANSETRON 2 MG/ML
4 INJECTION INTRAMUSCULAR; INTRAVENOUS EVERY 30 MIN PRN
Status: DISCONTINUED | OUTPATIENT
Start: 2025-07-22 | End: 2025-07-22 | Stop reason: HOSPADM

## 2025-07-22 RX ORDER — SODIUM CHLORIDE, SODIUM LACTATE, POTASSIUM CHLORIDE, CALCIUM CHLORIDE 600; 310; 30; 20 MG/100ML; MG/100ML; MG/100ML; MG/100ML
INJECTION, SOLUTION INTRAVENOUS CONTINUOUS PRN
Status: DISCONTINUED | OUTPATIENT
Start: 2025-07-22 | End: 2025-07-22

## 2025-07-22 RX ORDER — ACETAMINOPHEN 500 MG
500-1000 TABLET ORAL EVERY 6 HOURS PRN
Qty: 40 TABLET | Refills: 0 | Status: SHIPPED | OUTPATIENT
Start: 2025-07-22

## 2025-07-22 RX ORDER — OXYCODONE HYDROCHLORIDE 5 MG/1
5 TABLET ORAL EVERY 6 HOURS PRN
Qty: 12 TABLET | Refills: 0 | Status: CANCELLED | OUTPATIENT
Start: 2025-07-22

## 2025-07-22 RX ORDER — NALOXONE HYDROCHLORIDE 0.4 MG/ML
0.1 INJECTION, SOLUTION INTRAMUSCULAR; INTRAVENOUS; SUBCUTANEOUS
Status: DISCONTINUED | OUTPATIENT
Start: 2025-07-22 | End: 2025-07-22 | Stop reason: HOSPADM

## 2025-07-22 RX ORDER — DEXAMETHASONE SODIUM PHOSPHATE 4 MG/ML
4 INJECTION, SOLUTION INTRA-ARTICULAR; INTRALESIONAL; INTRAMUSCULAR; INTRAVENOUS; SOFT TISSUE
Status: DISCONTINUED | OUTPATIENT
Start: 2025-07-22 | End: 2025-07-22 | Stop reason: HOSPADM

## 2025-07-22 RX ORDER — DOCUSATE SODIUM 100 MG/1
100 CAPSULE, LIQUID FILLED ORAL 2 TIMES DAILY PRN
Qty: 12 CAPSULE | Refills: 0 | Status: CANCELLED | OUTPATIENT
Start: 2025-07-22

## 2025-07-22 RX ORDER — LIDOCAINE HYDROCHLORIDE 20 MG/ML
INJECTION, SOLUTION INFILTRATION; PERINEURAL PRN
Status: DISCONTINUED | OUTPATIENT
Start: 2025-07-22 | End: 2025-07-22

## 2025-07-22 RX ADMIN — ONDANSETRON 4 MG: 2 INJECTION INTRAMUSCULAR; INTRAVENOUS at 15:00

## 2025-07-22 RX ADMIN — DEXMEDETOMIDINE HYDROCHLORIDE 4 MCG: 100 INJECTION, SOLUTION INTRAVENOUS at 15:15

## 2025-07-22 RX ADMIN — DEXMEDETOMIDINE HYDROCHLORIDE 4 MCG: 100 INJECTION, SOLUTION INTRAVENOUS at 15:33

## 2025-07-22 RX ADMIN — PROPOFOL 100 MCG/KG/MIN: 10 INJECTION, EMULSION INTRAVENOUS at 14:53

## 2025-07-22 RX ADMIN — LIDOCAINE HYDROCHLORIDE 60 MG: 20 INJECTION, SOLUTION INFILTRATION; PERINEURAL at 14:53

## 2025-07-22 RX ADMIN — MIDAZOLAM 2 MG: 1 INJECTION INTRAMUSCULAR; INTRAVENOUS at 14:53

## 2025-07-22 RX ADMIN — SODIUM CHLORIDE, SODIUM LACTATE, POTASSIUM CHLORIDE, AND CALCIUM CHLORIDE: .6; .31; .03; .02 INJECTION, SOLUTION INTRAVENOUS at 14:53

## 2025-07-22 RX ADMIN — DEXMEDETOMIDINE HYDROCHLORIDE 8 MCG: 100 INJECTION, SOLUTION INTRAVENOUS at 15:01

## 2025-07-22 RX ADMIN — DEXMEDETOMIDINE HYDROCHLORIDE 4 MCG: 100 INJECTION, SOLUTION INTRAVENOUS at 15:07

## 2025-07-22 ASSESSMENT — ACTIVITIES OF DAILY LIVING (ADL)
ADLS_ACUITY_SCORE: 18
ADLS_ACUITY_SCORE: 18
ADLS_ACUITY_SCORE: 19
ADLS_ACUITY_SCORE: 18

## 2025-07-22 NOTE — DISCHARGE INSTRUCTIONS
Hand Surgery Discharge Instructions    Patient has been treated for trigger finger with Dr. Bonilla on 7/22/2025.     Care: Please keep the splint/cast/dressing clean and dry at all times. Should it get wet, please call the clinic to schedule an appointment - as it may need to be replaced. Do not hesitate to use the sling to help protect the affected extremity and in particular in the setting of a nerve block.     Medications: You can take Ibuprofen 400-800 mg and Tylenol 650 mg for pain relief. Please take each every 6 hours, and for optimal pain relief - please stagger the medications so that you are taking one or the other every 3 hours. If you had a nerve block, the effects may last 8-12 hours. If you have been prescribed additional pain medications, please take as instructed and as needed. If you are taking additional pain medications, please do not exceed 4000 mg of Tylenol daily from all sources. Also, if you are taking narcotic medications, please do not operate heavy machinery or drive. If you have been prescribed antibiotics, please also take as instructed.     Diet: Start with clear liquids and slow down if nauseated. Advance the diet as tolerated.    Weight-bearing/Activities: Move your fingers to prevent stiffness. Elevate the affected extremity to improve throbbing sensation or pain. No strenuous activities and do not raise your heart rate above 100 bpm for the first few weeks. Limit your weight-bearing with the affected extremity to 5-7 pounds unless otherwise specified.    ER Instructions: Please call the office and/or consider return to the ER if you experience worsening pain not relieved by medications, increased swelling, redness or high fevers >101F or if there are unexpected problems like shortness of breath.    Post-op follow-up: Clinic on 8/1/2025 with Dr. Bonilla or his PA at the [facility]. Please call our Ortho triage line if you have any questions or concerns before your clinic  visit: [phone].    Goyo Bonilla MD, PhD, FACS    Contacting your Doctor -   To contact a doctor, call Dr. Bonilla's clinic at [phone]  or:  [phone] and ask for the resident on call for PLASTIC SURGERY (answered 24 hours a day)   Emergency Department:  [place]: [phone]  [place]: [phone] 911 if you are in need of immediate or emergent help

## 2025-07-22 NOTE — BRIEF OP NOTE
[facility]    Brief Operative Note    Pre-operative diagnosis: Acquired trigger finger of left index finger [M65.322]  Post-operative diagnosis Same as pre-operative diagnosis    Procedure: Left thumb, index and middle finger A1 pulley releases, Left - Wrist    Surgeon: Surgeons and Role:     * Goyo Bonilla MD - Primary     * Rossy Field PA-C - Assisting     * Abhijit Wheeler MD - Assisting  Anesthesia: MAC with Local   Estimated Blood Loss: 5 mL from 7/22/2025  2:50 PM to 7/22/2025  3:47 PM      Drains: None  Specimens: * No specimens in log *  Findings:   None.  Complications: None.  Implants: * No implants in log *      Abhijit Wheeler MD  Plastic Surgery, PGY 2

## 2025-07-22 NOTE — ANESTHESIA POSTPROCEDURE EVALUATION
Patient: Salvatore Kim    Procedure: Procedure(s):  Left thumb, index and middle finger A1 pulley releases       Anesthesia Type:  MAC    Note:  Disposition: Outpatient   Postop Pain Control: Uneventful            Sign Out: Well controlled pain   PONV: No   Neuro/Psych: Uneventful            Sign Out: Acceptable/Baseline neuro status   Airway/Respiratory: Uneventful            Sign Out: Acceptable/Baseline resp. status   CV/Hemodynamics: Uneventful            Sign Out: Acceptable CV status; No obvious hypovolemia; No obvious fluid overload   Other NRE: NONE   DID A NON-ROUTINE EVENT OCCUR? No           Last vitals:  Vitals Value Taken Time   BP 95/66 07/22/25 15:58   Temp 36.6  C (97.9  F) 07/22/25 15:58   Pulse 54 07/22/25 15:58   Resp 14 07/22/25 15:58   SpO2 98 % 07/22/25 15:58   Vitals shown include unfiled device data.    Electronically Signed By: Luigi Kang MD  July 22, 2025  4:00 PM

## 2025-07-22 NOTE — ANESTHESIA PROCEDURE NOTES
Airway       Patient location during procedure: OR  Staff -        Resident/Fellow: Roberto Sandoval MD       Performed By: resident  Consent for Airway        Urgency: elective  Indications and Patient Condition       Indications for airway management: gwen-procedural       Induction type:intravenous       Mask difficulty assessment: 1 - vent by mask    Final Airway Details       Final airway type: endotracheal airway       Successful airway: ETT - single  Endotracheal Airway Details        ETT size (mm): 7.5       Cuffed: yes       Successful intubation technique: video laryngoscopy       VL Blade Size: MAC 4       Grade View of Cords: 1       Adjucts: stylet       Position: Right       Measured from: gums/teeth       Secured at (cm): 23       Bite block used: Soft    Post intubation assessment        Placement verified by: capnometry and equal breath sounds        Number of attempts at approach: 1       Number of other approaches attempted: 0       Secured with: tape       Ease of procedure: easy       Dentition: Intact

## 2025-07-22 NOTE — PROGRESS NOTES
Ortho Hand    No changes in history or examination.  Medically optimized.    OR today for left thumb, index and middle finger A1 pulley releases.    Discussed the risks of surgery, including but not limited to: infection, bleeding, pain, poor scarring, wound healing issues, injury to nerves and/or tendons, neuroma formation, recurrence of the condition including pain, stiffness, need for revision or additional surgery, complex regional pain syndrome, anesthesia-related complication. Despite these risks, patient consents to LEFT thumb, index and middle finger A1 pulley releases.  All questions answered.     Goyo Bonilla MD, PhD, FACS

## 2025-07-22 NOTE — ANESTHESIA CARE TRANSFER NOTE
Patient: Salvatore Kim    Procedure: Procedure(s):  Left thumb, index and middle finger A1 pulley releases       Diagnosis: Acquired trigger finger of left index finger [M65.322]  Diagnosis Additional Information: No value filed.    Anesthesia Type:   MAC     Note:    Oropharynx: oropharynx clear of all foreign objects and spontaneously breathing  Level of Consciousness: awake  Oxygen Supplementation: room air      Dentition: dentition unchanged  Vital Signs Stable: post-procedure vital signs reviewed and stable  Report to RN Given: handoff report given  Patient transferred to: Phase II    Handoff Report: Identifed the Patient, Identified the Reponsible Provider, Reviewed the pertinent medical history, Discussed the surgical course, Reviewed Intra-OP anesthesia mangement and issues during anesthesia, Set expectations for post-procedure period and Allowed opportunity for questions and acknowledgement of understanding      Vitals:  Vitals Value Taken Time   BP     Temp     Pulse     Resp     SpO2 97 % 07/22/25 15:51   Vitals shown include unfiled device data.    Electronically Signed By: Daquan Lainez  July 22, 2025  3:52 PM

## 2025-07-22 NOTE — OP NOTE
HAND SURGERY OPERATIVE REPORT     Date of Surgery: 7/22/2025  Surgical Service: Ortho Hand     Preoperative Diagnosis:   Left thumb triggering   Left index finger triggering  Left middle finger trigger     Postoperative Diagnosis: Same as preoperative diagnoses     Procedures Performed: 1  Left thumb A1 pulley release  Left index finger A1 pulley release   Left middle finger A1 pulley release     Attending:  MERVIN Bonilla MD, PhD, FACS  Assistant: THADDEUS Wheeler MD; THADDEUS Field PA-C     Complications: None apparent  Specimens: None  Implants: None  Estimated blood loss: 5 mL  Tourniquet time: < 30 minutes  Wound classification: Clean  Anesthesia: MAC with local     Indications for Procedure: This is a 49-year-old with left thumb, index and middle finger triggering refractory to conservative management. Patient would like to undergo surgery to relieve the triggering. No changes in history or exam.      Intraoperative findings: Left thumb, index and middle finger A1 pulleys were fully released. Digital nerves were identified and protected throughout.      Description of Procedure: Patient was seen in the preoperative holding area.  Consent was verified.  The left thumb, index and middle fingers were marked.  All additional questions were answered.  The risks of the surgery were reiterated, including (but not limited to): infection, bleeding, scarring, pain, injury to surrounding structures including nerves and tendons, need for additional revision surgery, neuroma formation, complex regional pain syndrome, stiffness, incomplete release, recurrence of triggering.  Following discussion of all these risks, the patient again agreed to proceed with surgery.  Patient was then transferred to the operating room and placed supine on the operating table.  All pressure points were padded.  Sequential compression devices were placed on bilateral lower extremities and verified to be operational.  Preinduction timeout was performed.   "Monitored anesthesia care was commenced.  The left hand was prepped and draped in usual sterile fashion. The forearm tourniquet was inflated to 250 mm Hg. Next, a transversely oriented incision was made at the base of the left thumb over the A1 pulley as well as longitudinally-oriented incisions were made over the A1 pulleys at the bases of the left index and middle fingers.  The same procedures were done on each of the affected digits.  Once through skin, tenotomies were used to dissect down onto the A1 pulley. The digital nerves were visualized and gently retracted out of the way. A Ray-Lucien was used to sweep the fat off of the flexor sheath. Once exposed, each A1 pulley was longitudinally and fully divided under direct visualization with a #15 blade. Once done, the left thumb, index and middle fingers could be fully extended with no issues. Next, a Ragnell was use to pull the flexor tendons out of the wound to gently perform a traction tenolysis along with confirming full release. Next, the tourniquet was taken down. The surgical wound was irrigated with sterile saline. The skin was closed with 4-0 Nylon interrupted horizontal mattress suture with care taken to take only superficial bites and skin-only. The incision was dressed with Xeroform, bacitracin, 4 x 4\" gauze, cast padding and a 2\" inch ACE bandage. Patient woke up, tolerated the procedure and was transferred to the PACU with no events.      Postoperative plan: Clinic in 10-14 days.     Goyo Bonilla MD, PhD, FACS  "

## 2025-08-05 ENCOUNTER — PATIENT OUTREACH (OUTPATIENT)
Dept: CARE COORDINATION | Facility: CLINIC | Age: 55
End: 2025-08-05
Payer: COMMERCIAL

## 2025-08-12 ENCOUNTER — TELEPHONE (OUTPATIENT)
Dept: SURGERY | Facility: CLINIC | Age: 55
End: 2025-08-12

## 2025-08-19 ENCOUNTER — PATIENT OUTREACH (OUTPATIENT)
Dept: CARE COORDINATION | Facility: CLINIC | Age: 55
End: 2025-08-19
Payer: COMMERCIAL

## (undated) DEVICE — GLOVE BIOGEL PI MICRO SZ 7.5 48575

## (undated) DEVICE — LINEN TOWEL PACK X6 WHITE 5487

## (undated) DEVICE — SOL NACL 0.9% IRRIG 1000ML BOTTLE 2F7124

## (undated) DEVICE — SYR 10ML FINGER CONTROL W/O NDL 309695

## (undated) DEVICE — GLOVE PROTEXIS MICRO 7.5 LT BLUE 2D73PM75

## (undated) DEVICE — SLING ARM MED 79-99155

## (undated) DEVICE — SU ETHILON 4-0 PC-3 18" 1864G

## (undated) DEVICE — DRAPE TIBURON HAND 29427

## (undated) DEVICE — SU MONOCRYL 3-0 SH 27" UND Y416H

## (undated) DEVICE — DRAPE MAYO STAND 23X54 8337

## (undated) DEVICE — PAD CHUX UNDERPAD 23X24" 7136

## (undated) DEVICE — SUCTION MANIFOLD NEPTUNE 2 SYS 4 PORT 0702-020-000

## (undated) DEVICE — TOURNIQUET CUFF 24" YELLOW LF 5921-024-135

## (undated) DEVICE — DRSG XEROFORM 5X9" CUR253590W

## (undated) DEVICE — PREP CHLORAPREP 26ML TINTED HI-LITE ORANGE 930815

## (undated) DEVICE — PACK NEURO MINOR UMMC SNE32MNMU4

## (undated) DEVICE — DRAPE SHEET REV FOLD 3/4 9349

## (undated) DEVICE — LINEN TOWEL PACK X5 5464

## (undated) DEVICE — ESU GROUND PAD ADULT W/CORD E7507

## (undated) DEVICE — SYR 01ML 27GA 0.5" NDL TBC 309623

## (undated) DEVICE — SLING ARM LG 79-99157

## (undated) DEVICE — SU MONOCRYL 2-0 SH 27" UND Y417H

## (undated) DEVICE — DRSG KERLIX 4 1/2"X4YDS ROLL 6715

## (undated) RX ORDER — FENTANYL CITRATE 50 UG/ML
INJECTION, SOLUTION INTRAMUSCULAR; INTRAVENOUS
Status: DISPENSED
Start: 2025-04-16

## (undated) RX ORDER — LIDOCAINE HYDROCHLORIDE AND EPINEPHRINE 10; 10 MG/ML; UG/ML
INJECTION, SOLUTION INFILTRATION; PERINEURAL
Status: DISPENSED
Start: 2025-07-22

## (undated) RX ORDER — LIDOCAINE HYDROCHLORIDE 10 MG/ML
INJECTION, SOLUTION EPIDURAL; INFILTRATION; INTRACAUDAL; PERINEURAL
Status: DISPENSED
Start: 2025-04-16

## (undated) RX ORDER — LIDOCAINE HYDROCHLORIDE 10 MG/ML
INJECTION, SOLUTION EPIDURAL; INFILTRATION; INTRACAUDAL; PERINEURAL
Status: DISPENSED
Start: 2025-07-22

## (undated) RX ORDER — PROPOFOL 10 MG/ML
INJECTION, EMULSION INTRAVENOUS
Status: DISPENSED
Start: 2025-07-22

## (undated) RX ORDER — BUPIVACAINE HYDROCHLORIDE 2.5 MG/ML
INJECTION, SOLUTION EPIDURAL; INFILTRATION; INTRACAUDAL; PERINEURAL
Status: DISPENSED
Start: 2025-07-22

## (undated) RX ORDER — BUPIVACAINE HYDROCHLORIDE 2.5 MG/ML
INJECTION, SOLUTION EPIDURAL; INFILTRATION; INTRACAUDAL; PERINEURAL
Status: DISPENSED
Start: 2025-04-16